# Patient Record
Sex: MALE | Race: WHITE | Employment: OTHER | URBAN - METROPOLITAN AREA
[De-identification: names, ages, dates, MRNs, and addresses within clinical notes are randomized per-mention and may not be internally consistent; named-entity substitution may affect disease eponyms.]

---

## 2017-01-17 ENCOUNTER — OFFICE VISIT (OUTPATIENT)
Dept: ENT CLINIC | Age: 72
End: 2017-01-17

## 2017-01-17 VITALS
WEIGHT: 215 LBS | DIASTOLIC BLOOD PRESSURE: 73 MMHG | SYSTOLIC BLOOD PRESSURE: 130 MMHG | HEIGHT: 74 IN | BODY MASS INDEX: 27.59 KG/M2 | HEART RATE: 95 BPM

## 2017-01-17 DIAGNOSIS — H91.93 HEARING LOSS OF BOTH EARS: Primary | ICD-10-CM

## 2017-01-17 DIAGNOSIS — R22.1 NECK MASS: ICD-10-CM

## 2017-01-17 DIAGNOSIS — H93.13 SUBJECTIVE TINNITUS OF BOTH EARS: ICD-10-CM

## 2017-01-17 PROCEDURE — 99204 OFFICE O/P NEW MOD 45 MIN: CPT | Performed by: OTOLARYNGOLOGY

## 2017-01-17 PROCEDURE — G8427 DOCREV CUR MEDS BY ELIG CLIN: HCPCS | Performed by: OTOLARYNGOLOGY

## 2017-01-17 PROCEDURE — 1036F TOBACCO NON-USER: CPT | Performed by: OTOLARYNGOLOGY

## 2017-01-17 PROCEDURE — 4040F PNEUMOC VAC/ADMIN/RCVD: CPT | Performed by: OTOLARYNGOLOGY

## 2017-01-17 PROCEDURE — G8484 FLU IMMUNIZE NO ADMIN: HCPCS | Performed by: OTOLARYNGOLOGY

## 2017-01-17 PROCEDURE — G8598 ASA/ANTIPLAT THER USED: HCPCS | Performed by: OTOLARYNGOLOGY

## 2017-01-17 PROCEDURE — 1123F ACP DISCUSS/DSCN MKR DOCD: CPT | Performed by: OTOLARYNGOLOGY

## 2017-01-17 PROCEDURE — G8420 CALC BMI NORM PARAMETERS: HCPCS | Performed by: OTOLARYNGOLOGY

## 2017-01-17 PROCEDURE — 3017F COLORECTAL CA SCREEN DOC REV: CPT | Performed by: OTOLARYNGOLOGY

## 2017-01-18 ENCOUNTER — TELEPHONE (OUTPATIENT)
Dept: ENT CLINIC | Age: 72
End: 2017-01-18

## 2017-01-18 DIAGNOSIS — R22.1 NECK MASS: Primary | ICD-10-CM

## 2017-01-24 ENCOUNTER — HOSPITAL ENCOUNTER (OUTPATIENT)
Dept: CT IMAGING | Age: 72
Discharge: HOME | End: 2017-01-24
Attending: OTOLARYNGOLOGY | Admitting: OTOLARYNGOLOGY

## 2017-01-24 DIAGNOSIS — R22.1 NECK MASS: ICD-10-CM

## 2017-01-24 DIAGNOSIS — R22.1 LOCALIZED SWELLING, MASS OR LUMP OF NECK: ICD-10-CM

## 2017-02-10 ENCOUNTER — OFFICE VISIT (OUTPATIENT)
Dept: ENT CLINIC | Age: 72
End: 2017-02-10

## 2017-02-10 VITALS
SYSTOLIC BLOOD PRESSURE: 134 MMHG | DIASTOLIC BLOOD PRESSURE: 6 MMHG | HEIGHT: 74 IN | BODY MASS INDEX: 27.59 KG/M2 | HEART RATE: 56 BPM | WEIGHT: 215 LBS

## 2017-02-10 DIAGNOSIS — H93.13 SUBJECTIVE TINNITUS OF BOTH EARS: ICD-10-CM

## 2017-02-10 DIAGNOSIS — H91.93 HEARING LOSS OF BOTH EARS: ICD-10-CM

## 2017-02-10 DIAGNOSIS — R22.1 NECK MASS: Primary | ICD-10-CM

## 2017-02-10 PROCEDURE — G8427 DOCREV CUR MEDS BY ELIG CLIN: HCPCS | Performed by: OTOLARYNGOLOGY

## 2017-02-10 PROCEDURE — 1036F TOBACCO NON-USER: CPT | Performed by: OTOLARYNGOLOGY

## 2017-02-10 PROCEDURE — 3017F COLORECTAL CA SCREEN DOC REV: CPT | Performed by: OTOLARYNGOLOGY

## 2017-02-10 PROCEDURE — 4040F PNEUMOC VAC/ADMIN/RCVD: CPT | Performed by: OTOLARYNGOLOGY

## 2017-02-10 PROCEDURE — 99213 OFFICE O/P EST LOW 20 MIN: CPT | Performed by: OTOLARYNGOLOGY

## 2017-02-10 PROCEDURE — G8484 FLU IMMUNIZE NO ADMIN: HCPCS | Performed by: OTOLARYNGOLOGY

## 2017-02-10 PROCEDURE — 1123F ACP DISCUSS/DSCN MKR DOCD: CPT | Performed by: OTOLARYNGOLOGY

## 2017-02-10 PROCEDURE — G8420 CALC BMI NORM PARAMETERS: HCPCS | Performed by: OTOLARYNGOLOGY

## 2017-02-10 PROCEDURE — G8598 ASA/ANTIPLAT THER USED: HCPCS | Performed by: OTOLARYNGOLOGY

## 2017-03-08 ENCOUNTER — TELEPHONE (OUTPATIENT)
Dept: ENT CLINIC | Age: 72
End: 2017-03-08

## 2017-04-07 ENCOUNTER — TELEPHONE (OUTPATIENT)
Dept: CARDIOLOGY CLINIC | Age: 72
End: 2017-04-07

## 2017-05-22 RX ORDER — ATORVASTATIN CALCIUM 40 MG/1
TABLET, FILM COATED ORAL
Qty: 90 TABLET | Refills: 1 | Status: SHIPPED | OUTPATIENT
Start: 2017-05-22 | End: 2017-11-28 | Stop reason: SDUPTHER

## 2017-05-22 RX ORDER — LISINOPRIL 20 MG/1
TABLET ORAL
Qty: 90 TABLET | Refills: 1 | Status: SHIPPED | OUTPATIENT
Start: 2017-05-22 | End: 2017-11-28 | Stop reason: SDUPTHER

## 2017-08-10 ENCOUNTER — TELEPHONE (OUTPATIENT)
Dept: CARDIOLOGY CLINIC | Age: 72
End: 2017-08-10

## 2017-09-12 ENCOUNTER — OFFICE VISIT (OUTPATIENT)
Dept: CARDIOLOGY CLINIC | Age: 72
End: 2017-09-12

## 2017-09-12 VITALS
SYSTOLIC BLOOD PRESSURE: 130 MMHG | DIASTOLIC BLOOD PRESSURE: 76 MMHG | BODY MASS INDEX: 27.98 KG/M2 | OXYGEN SATURATION: 98 % | HEART RATE: 56 BPM | WEIGHT: 218 LBS | HEIGHT: 74 IN

## 2017-09-12 DIAGNOSIS — I10 BENIGN HYPERTENSION: ICD-10-CM

## 2017-09-12 DIAGNOSIS — E78.00 HYPERCHOLESTEROLEMIA: ICD-10-CM

## 2017-09-12 DIAGNOSIS — I25.10 CORONARY ARTERY DISEASE INVOLVING NATIVE CORONARY ARTERY OF NATIVE HEART WITHOUT ANGINA PECTORIS: ICD-10-CM

## 2017-09-12 DIAGNOSIS — I48.21 PERMANENT ATRIAL FIBRILLATION (HCC): Primary | ICD-10-CM

## 2017-09-12 PROCEDURE — 4040F PNEUMOC VAC/ADMIN/RCVD: CPT | Performed by: INTERNAL MEDICINE

## 2017-09-12 PROCEDURE — 99214 OFFICE O/P EST MOD 30 MIN: CPT | Performed by: INTERNAL MEDICINE

## 2017-09-12 PROCEDURE — G8419 CALC BMI OUT NRM PARAM NOF/U: HCPCS | Performed by: INTERNAL MEDICINE

## 2017-09-12 PROCEDURE — 93000 ELECTROCARDIOGRAM COMPLETE: CPT | Performed by: INTERNAL MEDICINE

## 2017-09-12 PROCEDURE — G8598 ASA/ANTIPLAT THER USED: HCPCS | Performed by: INTERNAL MEDICINE

## 2017-09-12 PROCEDURE — G8427 DOCREV CUR MEDS BY ELIG CLIN: HCPCS | Performed by: INTERNAL MEDICINE

## 2017-09-12 PROCEDURE — 3017F COLORECTAL CA SCREEN DOC REV: CPT | Performed by: INTERNAL MEDICINE

## 2017-09-12 PROCEDURE — 1123F ACP DISCUSS/DSCN MKR DOCD: CPT | Performed by: INTERNAL MEDICINE

## 2017-09-12 PROCEDURE — 1036F TOBACCO NON-USER: CPT | Performed by: INTERNAL MEDICINE

## 2017-11-07 ENCOUNTER — TELEPHONE (OUTPATIENT)
Dept: FAMILY MEDICINE CLINIC | Age: 72
End: 2017-11-07

## 2017-11-07 DIAGNOSIS — Z12.5 SCREENING PSA (PROSTATE SPECIFIC ANTIGEN): ICD-10-CM

## 2017-11-07 DIAGNOSIS — E78.00 HYPERCHOLESTEROLEMIA: Primary | ICD-10-CM

## 2017-11-07 DIAGNOSIS — I10 BENIGN HYPERTENSION: ICD-10-CM

## 2017-11-13 DIAGNOSIS — Z12.5 SCREENING PSA (PROSTATE SPECIFIC ANTIGEN): ICD-10-CM

## 2017-11-13 DIAGNOSIS — E78.00 HYPERCHOLESTEROLEMIA: ICD-10-CM

## 2017-11-13 DIAGNOSIS — I10 BENIGN HYPERTENSION: ICD-10-CM

## 2017-11-13 LAB
A/G RATIO: 1.6 (ref 1.1–2.2)
ALBUMIN SERPL-MCNC: 4.1 G/DL (ref 3.4–5)
ALP BLD-CCNC: 81 U/L (ref 40–129)
ALT SERPL-CCNC: 17 U/L (ref 10–40)
ANION GAP SERPL CALCULATED.3IONS-SCNC: 15 MMOL/L (ref 3–16)
AST SERPL-CCNC: 33 U/L (ref 15–37)
BILIRUB SERPL-MCNC: 1.9 MG/DL (ref 0–1)
BUN BLDV-MCNC: 10 MG/DL (ref 7–20)
CALCIUM SERPL-MCNC: 9.5 MG/DL (ref 8.3–10.6)
CHLORIDE BLD-SCNC: 103 MMOL/L (ref 99–110)
CHOLESTEROL, TOTAL: 143 MG/DL (ref 0–199)
CO2: 25 MMOL/L (ref 21–32)
CREAT SERPL-MCNC: 1.1 MG/DL (ref 0.8–1.3)
GFR AFRICAN AMERICAN: >60
GFR NON-AFRICAN AMERICAN: >60
GLOBULIN: 2.6 G/DL
GLUCOSE BLD-MCNC: 96 MG/DL (ref 70–99)
HDLC SERPL-MCNC: 46 MG/DL (ref 40–60)
LDL CHOLESTEROL CALCULATED: 72 MG/DL
POTASSIUM SERPL-SCNC: 4.4 MMOL/L (ref 3.5–5.1)
PROSTATE SPECIFIC ANTIGEN: 0.73 NG/ML (ref 0–4)
SODIUM BLD-SCNC: 143 MMOL/L (ref 136–145)
TOTAL PROTEIN: 6.7 G/DL (ref 6.4–8.2)
TRIGL SERPL-MCNC: 125 MG/DL (ref 0–150)
VLDLC SERPL CALC-MCNC: 25 MG/DL

## 2017-11-28 ENCOUNTER — OFFICE VISIT (OUTPATIENT)
Dept: FAMILY MEDICINE CLINIC | Age: 72
End: 2017-11-28

## 2017-11-28 VITALS
WEIGHT: 217.8 LBS | BODY MASS INDEX: 27.95 KG/M2 | TEMPERATURE: 97.8 F | DIASTOLIC BLOOD PRESSURE: 80 MMHG | SYSTOLIC BLOOD PRESSURE: 136 MMHG | HEIGHT: 74 IN | HEART RATE: 64 BPM

## 2017-11-28 DIAGNOSIS — I48.21 PERMANENT ATRIAL FIBRILLATION (HCC): ICD-10-CM

## 2017-11-28 DIAGNOSIS — Z87.891 FORMER TOBACCO USE: ICD-10-CM

## 2017-11-28 DIAGNOSIS — I10 BENIGN HYPERTENSION: Primary | ICD-10-CM

## 2017-11-28 DIAGNOSIS — E78.00 HYPERCHOLESTEROLEMIA: ICD-10-CM

## 2017-11-28 DIAGNOSIS — H54.61 VISION LOSS OF RIGHT EYE: ICD-10-CM

## 2017-11-28 DIAGNOSIS — C44.311 BASAL CELL CARCINOMA OF SKIN OF NOSE: ICD-10-CM

## 2017-11-28 PROBLEM — I48.91 ATRIAL FIBRILLATION (HCC): Status: ACTIVE | Noted: 2017-11-28

## 2017-11-28 PROCEDURE — 1123F ACP DISCUSS/DSCN MKR DOCD: CPT | Performed by: FAMILY MEDICINE

## 2017-11-28 PROCEDURE — G8419 CALC BMI OUT NRM PARAM NOF/U: HCPCS | Performed by: FAMILY MEDICINE

## 2017-11-28 PROCEDURE — G8510 SCR DEP NEG, NO PLAN REQD: HCPCS | Performed by: FAMILY MEDICINE

## 2017-11-28 PROCEDURE — 3288F FALL RISK ASSESSMENT DOCD: CPT | Performed by: FAMILY MEDICINE

## 2017-11-28 PROCEDURE — G8427 DOCREV CUR MEDS BY ELIG CLIN: HCPCS | Performed by: FAMILY MEDICINE

## 2017-11-28 PROCEDURE — G8598 ASA/ANTIPLAT THER USED: HCPCS | Performed by: FAMILY MEDICINE

## 2017-11-28 PROCEDURE — 1036F TOBACCO NON-USER: CPT | Performed by: FAMILY MEDICINE

## 2017-11-28 PROCEDURE — G8484 FLU IMMUNIZE NO ADMIN: HCPCS | Performed by: FAMILY MEDICINE

## 2017-11-28 PROCEDURE — 4040F PNEUMOC VAC/ADMIN/RCVD: CPT | Performed by: FAMILY MEDICINE

## 2017-11-28 PROCEDURE — 99214 OFFICE O/P EST MOD 30 MIN: CPT | Performed by: FAMILY MEDICINE

## 2017-11-28 PROCEDURE — 3017F COLORECTAL CA SCREEN DOC REV: CPT | Performed by: FAMILY MEDICINE

## 2017-11-28 RX ORDER — ATORVASTATIN CALCIUM 40 MG/1
TABLET, FILM COATED ORAL
Qty: 90 TABLET | Refills: 3 | Status: SHIPPED | OUTPATIENT
Start: 2017-11-28 | End: 2018-12-18 | Stop reason: SDUPTHER

## 2017-11-28 RX ORDER — LISINOPRIL 20 MG/1
TABLET ORAL
Qty: 90 TABLET | Refills: 0 | Status: SHIPPED | OUTPATIENT
Start: 2017-11-28 | End: 2018-02-26 | Stop reason: SDUPTHER

## 2017-11-28 ASSESSMENT — PATIENT HEALTH QUESTIONNAIRE - PHQ9
SUM OF ALL RESPONSES TO PHQ9 QUESTIONS 1 & 2: 0
2. FEELING DOWN, DEPRESSED OR HOPELESS: 0
1. LITTLE INTEREST OR PLEASURE IN DOING THINGS: 0
SUM OF ALL RESPONSES TO PHQ QUESTIONS 1-9: 0

## 2017-11-28 ASSESSMENT — ENCOUNTER SYMPTOMS
RHINORRHEA: 0
BLOOD IN STOOL: 0
DIARRHEA: 0
CONSTIPATION: 0
ABDOMINAL PAIN: 0
CHEST TIGHTNESS: 0
SHORTNESS OF BREATH: 0
WHEEZING: 0
EYE PAIN: 0
VOMITING: 0
SINUS PRESSURE: 0
COUGH: 0
EYE DISCHARGE: 0

## 2017-11-28 NOTE — PROGRESS NOTES
Subjective:      Patient ID: Giles Childers is a 67 y.o. male. HPI  Chief Complaint   Patient presents with    Hypertension     annual f/u on htn and hld    Hyperlipidemia     Here for checkup on htn, hld. Doing well. Taking all meds  See cardiology for a fib. Is on eliquis- some easy bruising but no bleed  Also has had BCC taken of face  Did not f/u with ENT for hearing loss  Saw Dr. Antonieta Arias for vision exam  Denies CP,SOB or syncope  Did get flu shot  H/o tobacco use in the [address] PamAngus Hayward is a 67 y.o. male with the [facility] [place]  Patient Active Problem List    Diagnosis Date Noted    Hearing loss of both ears 01/17/2017    Subjective tinnitus of both ears 01/17/2017    Neck mass 01/17/2017    Occipital infarction ([facility] [address] 07/27/2016    Need for pneumococcal vaccination 06/28/2016    Hearing loss 06/28/2016    Vision changes 06/28/2016    Left varicocele 09/21/2011    BCC (basal cell carcinoma of skin) 09/21/2011    Routine general medical examination at a health care facility 09/21/2011    Hypercholesterolemia 08/18/2011    Benign hypertension 08/18/2011    CAD (coronary artery disease) 08/18/2011     Current Outpatient Prescriptions   Medication Sig Dispense Refill    apixaban (ELIQUIS) 5 MG TABS tablet Take 1 tablet by mouth 2 times daily 180 tablet 1    lisinopril (PRINIVIL;ZESTRIL) 20 MG tablet Take 1 tablet by mouth  daily 90 tablet 1    atorvastatin (LIPITOR) 40 MG tablet Take 1 tablet by mouth  nightly 90 tablet 1    triamcinolone (KENALOG) 0.1 % cream Apply topically 2 times daily Apply topically 2 times daily.  saw palmetto 160 MG capsule Take 160 mg by mouth daily.  Methylsulfonylmethane 1500 MG TABS Take  by mouth Daily.  nitroGLYCERIN (NITROSTAT) 0.4 MG SL tablet Place 0.4 mg under the tongue every 5 minutes as needed.  Glucosamine-Chondroit-Vit C-Mn (GLUCOSAMINE CHONDROITIN COMPLX) TABS Take 1 tablet by mouth daily. No current facility-administered medications for this visit. Allergies: Review of patient's allergies indicates no known allergies. Past Medical History:   Diagnosis Date    Atrial fibrillation ([address]     CAD (coronary artery disease)     GERD (gastroesophageal reflux disease)     Hyperlipidemia     Hypertension     Osteoarthritis      Past Surgical History:   Procedure Laterality Date    CORONARY ANGIOPLASTY WITH STENT PLACEMENT  2008    EXTERNAL EAR SURGERY Right     removed basal cell cancer    SKIN CANCER DESTRUCTION  03/2017    Right cheek     Family History   Problem Relation Age of Onset    Heart Disease Father     Stroke Father      Social History   Substance Use Topics    Smoking status: Former Smoker    Smokeless tobacco: Never Used    Alcohol use No     Vitals:    11/28/17 1017   BP: 136/80   Pulse: 64   Temp: 97.8 °F (36.6 °C)   TempSrc: Oral   Weight: 217 lb 12.8 oz (98.8 kg)   Height: 6' 2\" (1.88 m)     Body mass index is 27.96 kg/m².      Wt Readings from Last 3 Encounters:   11/28/17 217 lb 12.8 oz (98.8 kg)   09/12/17 218 lb (98.9 kg)   02/10/17 215 lb (97.5 kg)     BP Readings from Last 3 Encounters:   11/28/17 136/80   09/12/17 130/76   02/10/17 (!) 134/6      Lab Review   Orders Only on 11/13/2017   Component Date Value    Cholesterol, Total 11/13/2017 143     Triglycerides 11/13/2017 125     HDL 11/13/2017 46     LDL Calculated 11/13/2017 72     VLDL CHOLESTEROL CALCULA* 11/13/2017 25     Sodium 11/13/2017 143     Potassium 11/13/2017 4.4     Chloride 11/13/2017 103     CO2 11/13/2017 25     Anion Gap 11/13/2017 15     Glucose 11/13/2017 96     BUN 11/13/2017 10     CREATININE 11/13/2017 1.1     GFR Non- 11/13/2017 >60     GFR  11/13/2017 >60     Calcium 11/13/2017 9.5     Total Protein 11/13/2017 6.7     Alb 11/13/2017 4.1     Albumin/Globulin Ratio 11/13/2017 1.6     Total Bilirubin 11/13/2017 1.9*    Alkaline Phosphatase 11/13/2017 81     ALT 11/13/2017 17     AST 11/13/2017 33     Globulin 11/13/2017 2.6     PSA 11/13/2017 0.73        Review of Systems   Constitutional: Negative for fatigue, fever and unexpected weight change. HENT: Positive for hearing loss. Negative for congestion, ear discharge, ear pain, rhinorrhea and sinus pressure. Eyes: Negative for pain, discharge and visual disturbance. Respiratory: Negative for cough, chest tightness, shortness of breath and wheezing. Cardiovascular: Negative for chest pain, palpitations and leg swelling. Gastrointestinal: Negative for abdominal pain, blood in stool, constipation, diarrhea and vomiting. Genitourinary: Negative for difficulty urinating, dysuria and hematuria. Neurological: Negative for dizziness, seizures, syncope and headaches. Psychiatric/Behavioral: Negative for dysphoric mood and sleep disturbance. The patient is not nervous/anxious. Objective:   Physical Exam   Constitutional: He is oriented to person, place, and time. He appears well-developed and well-nourished. No distress. HENT:   Head: Normocephalic. Right Ear: External ear normal.   Left Ear: External ear normal.   Nose: Nose normal.   Mouth/Throat: Oropharynx is clear and moist. No oropharyngeal exudate. Eyes: EOM are normal. Pupils are equal, round, and reactive to light. Neck: Neck supple. No thyromegaly present. Cardiovascular: Normal heart sounds and intact distal pulses. No murmur heard. A fib   Pulmonary/Chest: Effort normal and breath sounds normal. He has no wheezes. Abdominal: Soft. Bowel sounds are normal. He exhibits no distension. There is no tenderness. There is no rebound and no guarding. Musculoskeletal: Normal range of motion. Lymphadenopathy:     He has no cervical adenopathy. Neurological: He is alert and oriented to person, place, and time. No cranial nerve deficit. Coordination normal.   Skin: Skin is warm and dry. Psychiatric: He has a normal mood and affect.  His behavior is normal. Judgment and thought content normal.       Assessment:      HTN- controlled on meds  HLD- well controlled  A finb- onb eliquis  HEARING LOSS- sees [facility] on face- sees DERM  H/o tobacco use- rec AAA screen      Plan:      Reviewed labs w/ pt  Diet and exercise  Orders Placed This Encounter   Procedures    US screening for AAA     Standing Status:   Future     Standing Expiration Date:   11/28/2018     Order Specific Question:   Reason for exam:     Answer:   screen   refill meds  F/u new PCP

## 2017-11-30 RX ORDER — APIXABAN 5 MG/1
TABLET, FILM COATED ORAL
Qty: 180 TABLET | Refills: 3 | Status: SHIPPED | OUTPATIENT
Start: 2017-11-30 | End: 2018-03-29 | Stop reason: SDUPTHER

## 2017-12-15 ENCOUNTER — OFFICE VISIT (OUTPATIENT)
Dept: FAMILY MEDICINE CLINIC | Age: 72
End: 2017-12-15

## 2017-12-15 VITALS
HEIGHT: 74 IN | TEMPERATURE: 98.4 F | SYSTOLIC BLOOD PRESSURE: 130 MMHG | DIASTOLIC BLOOD PRESSURE: 82 MMHG | WEIGHT: 217.8 LBS | BODY MASS INDEX: 27.95 KG/M2

## 2017-12-15 DIAGNOSIS — J01.90 ACUTE BACTERIAL SINUSITIS: ICD-10-CM

## 2017-12-15 DIAGNOSIS — B96.89 ACUTE BACTERIAL SINUSITIS: ICD-10-CM

## 2017-12-15 PROCEDURE — 1036F TOBACCO NON-USER: CPT | Performed by: INTERNAL MEDICINE

## 2017-12-15 PROCEDURE — G8598 ASA/ANTIPLAT THER USED: HCPCS | Performed by: INTERNAL MEDICINE

## 2017-12-15 PROCEDURE — G8419 CALC BMI OUT NRM PARAM NOF/U: HCPCS | Performed by: INTERNAL MEDICINE

## 2017-12-15 PROCEDURE — G8427 DOCREV CUR MEDS BY ELIG CLIN: HCPCS | Performed by: INTERNAL MEDICINE

## 2017-12-15 PROCEDURE — 99213 OFFICE O/P EST LOW 20 MIN: CPT | Performed by: INTERNAL MEDICINE

## 2017-12-15 PROCEDURE — 4040F PNEUMOC VAC/ADMIN/RCVD: CPT | Performed by: INTERNAL MEDICINE

## 2017-12-15 PROCEDURE — 1123F ACP DISCUSS/DSCN MKR DOCD: CPT | Performed by: INTERNAL MEDICINE

## 2017-12-15 PROCEDURE — 3017F COLORECTAL CA SCREEN DOC REV: CPT | Performed by: INTERNAL MEDICINE

## 2017-12-15 PROCEDURE — G8484 FLU IMMUNIZE NO ADMIN: HCPCS | Performed by: INTERNAL MEDICINE

## 2017-12-15 RX ORDER — METHYLPREDNISOLONE 4 MG/1
2 TABLET ORAL DAILY
Status: ON HOLD | COMMUNITY
End: 2018-06-20 | Stop reason: ALTCHOICE

## 2017-12-15 RX ORDER — CEFUROXIME AXETIL 250 MG/1
250 TABLET ORAL 2 TIMES DAILY
Qty: 20 TABLET | Refills: 0 | Status: SHIPPED | OUTPATIENT
Start: 2017-12-15 | End: 2017-12-25

## 2017-12-15 ASSESSMENT — ENCOUNTER SYMPTOMS
COUGH: 0
RHINORRHEA: 1
APNEA: 0
SINUS PAIN: 1
ABDOMINAL PAIN: 0
SHORTNESS OF BREATH: 0

## 2017-12-15 NOTE — PROGRESS NOTES
Subjective:      Patient ID: Bobby Soulier is a 67 y.o. male. HPI   Chief Complaint   Patient presents with    Sinusitis     patient c/o sinus infection x 2 weeks; head congestion, difficulty hearing, sinus drainage. patient denies sore throat, cough, fever     Bobby Soulier is a 67 y.o. male with the following history as recorded in [facility]:  Patient Active Problem List    Diagnosis Date Noted    Vision loss of right eye 11/28/2017    Atrial fibrillation ([facility] [address] 11/28/2017    Former tobacco use 11/28/2017    Hearing loss of both ears 01/17/2017    Subjective tinnitus of both ears 01/17/2017    Neck mass 01/17/2017    Occipital infarction ([facility] [address] 07/27/2016    Need for pneumococcal vaccination 06/28/2016    Vision changes 06/28/2016    BCC (basal cell carcinoma of skin) 09/21/2011    Hypercholesterolemia 08/18/2011    Benign hypertension 08/18/2011    CAD (coronary artery disease) 08/18/2011     Current Outpatient Prescriptions   Medication Sig Dispense Refill    methylPREDNISolone (MEDROL) 4 MG tablet Take 2 mg by mouth daily      ELIQUIS 5 MG TABS tablet TAKE 1 TABLET BY MOUTH TWO  TIMES DAILY 180 tablet 3    lisinopril (PRINIVIL;ZESTRIL) 20 MG tablet TAKE 1 TABLET BY MOUTH  DAILY 90 tablet 0    atorvastatin (LIPITOR) 40 MG tablet TAKE 1 TABLET BY MOUTH  NIGHTLY 90 tablet 3    triamcinolone (KENALOG) 0.1 % cream Apply topically 2 times daily Apply topically 2 times daily.  saw palmetto 160 MG capsule Take 160 mg by mouth daily.  nitroGLYCERIN (NITROSTAT) 0.4 MG SL tablet Place 0.4 mg under the tongue every 5 minutes as needed.  Glucosamine-Chondroit-Vit C-Mn (GLUCOSAMINE CHONDROITIN COMPLX) TABS Take 1 tablet by mouth daily. No current facility-administered medications for this visit. Allergies: Review of patient's allergies indicates no known allergies.   Past Medical History:   Diagnosis Date    Atrial fibrillation ([address]     CAD (coronary artery disease)  GERD (gastroesophageal reflux disease)     Hyperlipidemia     Hypertension     Osteoarthritis      Past Surgical History:   Procedure Laterality Date    CORONARY ANGIOPLASTY WITH STENT PLACEMENT  2008    EXTERNAL EAR SURGERY Right     removed basal cell cancer    SKIN CANCER DESTRUCTION  03/2017    Right cheek     Family History   Problem Relation Age of Onset    Heart Disease Father     Stroke Father      Social History   Substance Use Topics    Smoking status: Former Smoker    Smokeless tobacco: Never Used    Alcohol use No       Review of Systems   Constitutional: Negative for chills and diaphoresis. HENT: Positive for congestion, postnasal drip, rhinorrhea and sinus pain. Eyes: Negative for visual disturbance. Respiratory: Negative for apnea, cough and shortness of breath. Cardiovascular: Negative for chest pain. Gastrointestinal: Negative for abdominal pain. Objective:   Physical Exam   Constitutional: He appears well-developed and well-nourished. HENT:   Head: Normocephalic and atraumatic. Edema bilat. nasal turbinates with drainage . Cardiovascular: Normal rate. No murmur heard. Pulmonary/Chest: Effort normal and breath sounds normal.   Abdominal: He exhibits no distension. There is no tenderness.        Assessment:      sinusitis      Plan:      ceftin 250 mg bid x 10  d

## 2018-02-27 RX ORDER — LISINOPRIL 20 MG/1
TABLET ORAL
Qty: 90 TABLET | Refills: 0 | Status: SHIPPED | OUTPATIENT
Start: 2018-02-27 | End: 2018-04-16 | Stop reason: SDUPTHER

## 2018-03-29 NOTE — TELEPHONE ENCOUNTER
Medication Refill    Medication needing refilled: eliquis     Doseage of the medication: 5mg    How are you taking this medication (QD, BID, TID, QID, PRN): 2x daily     Patient want a 30 or 90 day supply called in: 90 days    Which Pharmacy are we sending the medication to:    Pharmacy:  [facility] [place] [address],[address] [facility] -  [phone]

## 2018-03-29 NOTE — TELEPHONE ENCOUNTER
Last O/V:  09/12/17  Next O/V:  09/12/18    Last Labs:CMP  : 11/13/17    Please sign for Dr. Brant Francis

## 2018-04-16 RX ORDER — LISINOPRIL 20 MG/1
TABLET ORAL
Qty: 90 TABLET | Refills: 3 | Status: SHIPPED | OUTPATIENT
Start: 2018-04-16 | End: 2018-09-12 | Stop reason: ALTCHOICE

## 2018-06-19 PROBLEM — R00.1 SYMPTOMATIC BRADYCARDIA: Status: ACTIVE | Noted: 2018-06-19

## 2018-06-22 PROBLEM — Z95.0 PACEMAKER: Status: ACTIVE | Noted: 2018-06-22

## 2018-06-27 ENCOUNTER — TELEPHONE (OUTPATIENT)
Dept: CARDIOLOGY CLINIC | Age: 73
End: 2018-06-27

## 2018-07-02 ENCOUNTER — OFFICE VISIT (OUTPATIENT)
Dept: CARDIOLOGY CLINIC | Age: 73
End: 2018-07-02

## 2018-07-02 ENCOUNTER — PROCEDURE VISIT (OUTPATIENT)
Dept: CARDIOLOGY CLINIC | Age: 73
End: 2018-07-02

## 2018-07-02 VITALS
WEIGHT: 211 LBS | DIASTOLIC BLOOD PRESSURE: 84 MMHG | BODY MASS INDEX: 27.08 KG/M2 | HEIGHT: 74 IN | OXYGEN SATURATION: 98 % | HEART RATE: 60 BPM | SYSTOLIC BLOOD PRESSURE: 128 MMHG

## 2018-07-02 DIAGNOSIS — I48.91 ATRIAL FIBRILLATION WITH SLOW VENTRICULAR RESPONSE (HCC): Primary | ICD-10-CM

## 2018-07-02 DIAGNOSIS — Z95.0 PACEMAKER: ICD-10-CM

## 2018-07-02 DIAGNOSIS — I25.10 CORONARY ARTERY DISEASE INVOLVING NATIVE CORONARY ARTERY OF NATIVE HEART WITHOUT ANGINA PECTORIS: ICD-10-CM

## 2018-07-02 DIAGNOSIS — E78.2 MIXED HYPERLIPIDEMIA: ICD-10-CM

## 2018-07-02 DIAGNOSIS — R00.1 BRADYCARDIA: ICD-10-CM

## 2018-07-02 DIAGNOSIS — R00.1 BRADYCARDIA WITH 31-40 BEATS PER MINUTE: ICD-10-CM

## 2018-07-02 DIAGNOSIS — I10 ESSENTIAL HYPERTENSION: ICD-10-CM

## 2018-07-02 DIAGNOSIS — Z95.0 PACEMAKER: Primary | ICD-10-CM

## 2018-07-02 PROCEDURE — 99024 POSTOP FOLLOW-UP VISIT: CPT | Performed by: INTERNAL MEDICINE

## 2018-07-02 NOTE — PATIENT INSTRUCTIONS
Patient Education        Atrial Fibrillation: Care Instructions  Your Care Instructions    Atrial fibrillation is an irregular and often fast heartbeat. Treating this condition is important for several reasons. It can cause blood clots, which can travel from your heart to your brain and cause a stroke. If you have a fast heartbeat, you may feel lightheaded, dizzy, and weak. An irregular heartbeat can also increase your risk for heart failure. Atrial fibrillation is often the result of another heart condition, such as high blood pressure or coronary artery disease. Making changes to improve your heart condition will help you stay healthy and active. Follow-up care is a key part of your treatment and safety. Be sure to make and go to all appointments, and call your doctor if you are having problems. It's also a good idea to know your test results and keep a list of the medicines you take. How can you care for yourself at home? Medicines    · Take your medicines exactly as prescribed. Call your doctor if you think you are having a problem with your medicine. You will get more details on the specific medicines your doctor prescribes.     · If your doctor has given you a blood thinner to prevent a stroke, be sure you get instructions about how to take your medicine safely. Blood thinners can cause serious bleeding problems.     · Do not take any vitamins, over-the-counter drugs, or herbal products without talking to your doctor first.    Lifestyle changes    · Do not smoke. Smoking can increase your chance of a stroke and heart attack. If you need help quitting, talk to your doctor about stop-smoking programs and medicines. These can increase your chances of quitting for good.     · Eat a heart-healthy diet.     · Stay at a healthy weight. Lose weight if you need to.     · Limit alcohol to 2 drinks a day for men and 1 drink a day for women. Too much alcohol can cause health problems.     · Avoid colds and flu.  Get

## 2018-07-02 NOTE — PROGRESS NOTES
Patient comes in for programming evaluation for his pacemaker ([employer], implanted 6/20/18). The device is functioning within normal parameters. Programmed outputs per safety margins. (changed to 2.5V @ 0.24ms). Rate response turned on per Dr. Flavia Lees recommendations. Programmed to VVIR. Exercise test performed on pt to confirm vector for rate response. No new episodes/arrhythmias recorded. Please see interrogation for more detail. Dr. Sylvia Duran to review interrogation. Patient is to see Dr. Sylvia Duran in office today also. Patient will follow up in 3 months in office or remotely.

## 2018-07-02 NOTE — PROGRESS NOTES
(NITROSTAT) 0.4 MG SL tablet Place 0.4 mg under the tongue every 5 minutes as needed. Yes Historical Provider, MD   Glucosamine-Chondroit-Vit C-Mn (GLUCOSAMINE CHONDROITIN COMPLX) TABS Take 1 tablet by mouth daily. Yes Historical Provider, MD       Social History:  Reviewed. reports that he has quit smoking. He has never used smokeless tobacco. He reports that he does not drink alcohol or use drugs. Family History:  Reviewed. family history includes Heart Disease in his father; Stroke in his father. Denies family history of sudden cardiac death, arrhythmia, premature CAD    Review of System:    · General ROS: negative for - chills, fever   · Psychological ROS: negative for - anxiety or depression  · Ophthalmic ROS: negative for - eye pain or loss of vision  · ENT ROS: negative for - headaches, sore throat   · Allergy and Immunology ROS: negative for - hives  · Hematological and Lymphatic ROS: negative for - bleeding problems, blood clots, bruising or jaundice  · Endocrine ROS: negative for - skin changes, temperature intolerance or unexpected weight changes  · Respiratory ROS: negative for - cough, sputum, wheezing  · Cardiovascular ROS: Per HPI. · Gastrointestinal ROS: negative for - abdominal pain, diarrhea, nausea/vomiting, bleeding   · Genito-Urinary ROS: negative for - dysuria or incontinence  · Musculoskeletal ROS: negative for - joint swelling   · Neurological ROS: negative for - confusion, numbness/tingling, seizures, weakness +lightheadedness  · Dermatological ROS: negative for - rash    Physical Examination:  Vitals:    07/02/18 0904   BP: 128/84   Pulse: 60   SpO2: 98%       · Constitutional: Oriented. No distress. · Head: Normocephalic and atraumatic. · Mouth/Throat: Oropharynx is clear and moist.   · Eyes: Conjunctivae normal. EOM are normal.   · Neck: Normal range of motion. Neck supple. No rigidity. No JVD present.    · Cardiovascular: Normal rate, regular rhythm, S1&S2 and intact distal pulses. · Pulmonary/Chest: Bilateral respiratory sounds. No wheezes. No rhonchi. · Abdominal: Soft. Bowel sounds present. No distension, No tenderness. · Musculoskeletal: No tenderness. No edema    · Lymphadenopathy: Has no cervical adenopathy. · Neurological: Alert and oriented. Cranial nerve appears intact, No Gross deficit   · Skin: Skin is warm and dry. No rash noted. +Right groin unremarkable  · Psychiatric: Has a normal mood, affect and behavior       Labs:  Reviewed. ECG: reviewed, 6/19/18 Afib SVR    Echo: 8/2/16  Concentric LVH with normal systolic function. EF is   60%  The left atrium is at the upper limits of normal in size. Trivial mitral, aortic and tricuspid regurgitation is present. Normal right ventricular size and function.     Stress MPI: 12/8/2008  Large sized severe apical, anteroseptal and septal   completely reversible defect consistent with ischemia   in the territory typical of the proximal LAD. Cath: 2008  Single vessel CAD involving LAD  Normal LV function  S/p multiple stents in the LAD     Assessment:   Patient Active Problem List    Diagnosis Date Noted    Pacemaker 06/22/2018    Bradycardia with 31-40 beats per minute 06/19/2018    Acute bacterial sinusitis 12/15/2017    Vision loss of right eye 11/28/2017    Atrial fibrillation with slow ventricular response ([address] 11/28/2017    Former tobacco use 11/28/2017    Hearing loss of both ears 01/17/2017    Subjective tinnitus of both ears 01/17/2017    Neck mass 01/17/2017    Occipital infarction ([address] 07/27/2016    Need for pneumococcal vaccination 06/28/2016    Vision changes 06/28/2016    BCC (basal cell carcinoma of skin) 09/21/2011    Hypercholesterolemia 08/18/2011    Benign hypertension 08/18/2011    CAD (coronary artery disease) 08/18/2011        Plan:  1) Bradycardia: Symptomatic. S/p PPM (Medtronic Micro Lead Less). Device interrogated today and functioning appropriately.   97%     2) Atrial fib SVR: Paroxysmal. No afib noted on device interrogation today. CHADS Vasc 2 (age, HTN)- on Eliquis     3) CAD: S/p stent to LAD. No angina. Continue ASA, statin and BB. 3) HTN: Controlled. Continue current medications.       4) HLD: Continue statin    Follow up in 3 months    I have discussed the plan of care with the primary care team.    Stephanie Carson MD, PhD

## 2018-09-12 ENCOUNTER — OFFICE VISIT (OUTPATIENT)
Dept: CARDIOLOGY CLINIC | Age: 73
End: 2018-09-12

## 2018-09-12 VITALS
HEART RATE: 69 BPM | HEIGHT: 74 IN | DIASTOLIC BLOOD PRESSURE: 70 MMHG | SYSTOLIC BLOOD PRESSURE: 134 MMHG | WEIGHT: 213 LBS | BODY MASS INDEX: 27.34 KG/M2 | OXYGEN SATURATION: 97 %

## 2018-09-12 DIAGNOSIS — E78.00 HYPERCHOLESTEROLEMIA: ICD-10-CM

## 2018-09-12 DIAGNOSIS — I48.20 CHRONIC A-FIB (HCC): Primary | ICD-10-CM

## 2018-09-12 DIAGNOSIS — I25.10 CORONARY ARTERY DISEASE INVOLVING NATIVE CORONARY ARTERY OF NATIVE HEART WITHOUT ANGINA PECTORIS: ICD-10-CM

## 2018-09-12 DIAGNOSIS — I10 ESSENTIAL HYPERTENSION: ICD-10-CM

## 2018-09-12 PROCEDURE — 3017F COLORECTAL CA SCREEN DOC REV: CPT | Performed by: INTERNAL MEDICINE

## 2018-09-12 PROCEDURE — G8598 ASA/ANTIPLAT THER USED: HCPCS | Performed by: INTERNAL MEDICINE

## 2018-09-12 PROCEDURE — 1123F ACP DISCUSS/DSCN MKR DOCD: CPT | Performed by: INTERNAL MEDICINE

## 2018-09-12 PROCEDURE — G8419 CALC BMI OUT NRM PARAM NOF/U: HCPCS | Performed by: INTERNAL MEDICINE

## 2018-09-12 PROCEDURE — 93000 ELECTROCARDIOGRAM COMPLETE: CPT | Performed by: INTERNAL MEDICINE

## 2018-09-12 PROCEDURE — 1036F TOBACCO NON-USER: CPT | Performed by: INTERNAL MEDICINE

## 2018-09-12 PROCEDURE — 1101F PT FALLS ASSESS-DOCD LE1/YR: CPT | Performed by: INTERNAL MEDICINE

## 2018-09-12 PROCEDURE — 4040F PNEUMOC VAC/ADMIN/RCVD: CPT | Performed by: INTERNAL MEDICINE

## 2018-09-12 PROCEDURE — 99214 OFFICE O/P EST MOD 30 MIN: CPT | Performed by: INTERNAL MEDICINE

## 2018-09-12 PROCEDURE — G8427 DOCREV CUR MEDS BY ELIG CLIN: HCPCS | Performed by: INTERNAL MEDICINE

## 2018-09-12 RX ORDER — LOSARTAN POTASSIUM 100 MG/1
100 TABLET ORAL DAILY
Qty: 30 TABLET | Refills: 3 | Status: SHIPPED | OUTPATIENT
Start: 2018-09-12 | End: 2019-02-06 | Stop reason: ALTCHOICE

## 2018-09-12 NOTE — PROGRESS NOTES
(PRINIVIL;ZESTRIL) 20 MG tablet TAKE 1 TABLET BY MOUTH  DAILY 90 tablet 3    apixaban (ELIQUIS) 5 MG TABS tablet TAKE 1 TABLET BY MOUTH TWO  TIMES DAILY 180 tablet 3    atorvastatin (LIPITOR) 40 MG tablet TAKE 1 TABLET BY MOUTH  NIGHTLY 90 tablet 3    saw palmetto 160 MG capsule Take 160 mg by mouth daily.  nitroGLYCERIN (NITROSTAT) 0.4 MG SL tablet Place 0.4 mg under the tongue every 5 minutes as needed.  Glucosamine-Chondroit-Vit C-Mn (GLUCOSAMINE CHONDROITIN COMPLX) TABS Take 1 tablet by mouth daily. No current facility-administered medications for this visit. EC18 V-paced with underlying AFIB    ECHO 16  Concentric LVH with normal systolic function. EF is 60% The left atrium is at the upper limits of normal in size. Trivial mitral, aortic and tricuspid regurgitation is present. Normal right ventricular size and function.       Stress MPI: 2008  Large sized severe apical, anteroseptal and septal completely reversible defect consistent with ischemia in the territory typical of the proximal LAD. Corornary angiogram  & Intervention:   Single vessel CAD involving LAD  Normal LV function  S/p multiple stents in the LAD      Assessment/Plan:    Only complaint today is a rash/itching. He has had this problem for approx 4 years and has been to a dermatologist who says he has dermatitis. I will switch him from Lisinopril to Losartan and have him hold Atorvastatin to see if this helps. If no resolve, he will need to resume both medications. He also has a balance issue and will require a handicap placard. Chronic Atrial Fibrillation  V-paced with underlying afib on ECG today  CHads Vasc at least 2 (Age, HTN)  Continue Eliquis 5 mg BID    CAD  S/p stent LAD   No angina  Continue ACE-I and Statin    Essential Hypertension  BP is controlled   On ACE-I    Hypercholesterolemia  On Atorvastatin    PPM  S/p 18  Medtronic Micro Leadless     Follow up in 1 year. Thank you very much for allowing me to participate in the care of your patient. Please do not hesitate to contact me if you have any questions. Sincerely,    Siva Vaca M.D  [facility], [address] [place], [place] [address]John [place] [address]  Ph: [phone]  Fax: [phone]      Physician Attestation:  The scribes documentation has been prepared under my direction and personally reviewed by me in its entirety. I confirm that the note above accurately reflects all work, treatment, procedures, and medical decision making performed by me.

## 2018-09-12 NOTE — PATIENT INSTRUCTIONS
a pneumococcal vaccine shot. If you have had one before, ask your doctor whether you need another dose. Get a flu shot every year. If you must be around people with colds or flu, wash your hands often. Activity    · If your doctor recommends it, get more exercise. Walking is a good choice. Bit by bit, increase the amount you walk every day. Try for at least 30 minutes on most days of the week. You also may want to swim, bike, or do other activities. Your doctor may suggest that you join a cardiac rehabilitation program so that you can have help increasing your physical activity safely.     · Start light exercise if your doctor says it is okay. Even a small amount will help you get stronger, have more energy, and manage stress. Walking is an easy way to get exercise. Start out by walking a little more than you did in the hospital. Gradually increase the amount you walk.     · When you exercise, watch for signs that your heart is working too hard. You are pushing too hard if you cannot talk while you are exercising. If you become short of breath or dizzy or have chest pain, sit down and rest immediately.     · Check your pulse regularly. Place two fingers on the artery at the palm side of your wrist, in line with your thumb. If your heartbeat seems uneven or fast, talk to your doctor. When should you call for help? Call 911 anytime you think you may need emergency care. For example, call if:    · You have symptoms of a heart attack. These may include:  ¨ Chest pain or pressure, or a strange feeling in the chest.  ¨ Sweating. ¨ Shortness of breath. ¨ Nausea or vomiting. ¨ Pain, pressure, or a strange feeling in the back, neck, jaw, or upper belly or in one or both shoulders or arms. ¨ Lightheadedness or sudden weakness. ¨ A fast or irregular heartbeat. After you call 911, the  may tell you to chew 1 adult-strength or 2 to 4 low-dose aspirin. Wait for an ambulance.  Do not try to drive yourself.     · You have symptoms of a stroke. These may include:  ¨ Sudden numbness, tingling, weakness, or loss of movement in your face, arm, or leg, especially on only one side of your body. ¨ Sudden vision changes. ¨ Sudden trouble speaking. ¨ Sudden confusion or trouble understanding simple statements. ¨ Sudden problems with walking or balance. ¨ A sudden, severe headache that is different from past headaches.     · You passed out (lost consciousness).    Call your doctor now or seek immediate medical care if:    · You have new or increased shortness of breath.     · You feel dizzy or lightheaded, or you feel like you may faint.     · Your heart rate becomes irregular.     · You can feel your heart flutter in your chest or skip heartbeats. Tell your doctor if these symptoms are new or worse.    Watch closely for changes in your health, and be sure to contact your doctor if you have any problems. Where can you learn more? Go to https://SHOP.COM.[employer]. org and sign in to your [employer] account. Enter U020 in the [employer] box to learn more about \"Atrial Fibrillation: Care Instructions. \"     If you do not have an account, please click on the \"Sign Up Now\" link. Current as of: December 6, 2017  Content Version: 11.7  © 0474-0439 [employer]. Care instructions adapted under license by [facility] ([facility]). If you have questions about a medical condition or this instruction, always ask your healthcare professional. [place] [address] any warranty or liability for your use of this information.

## 2018-10-09 ENCOUNTER — OFFICE VISIT (OUTPATIENT)
Dept: INTERNAL MEDICINE CLINIC | Age: 73
End: 2018-10-09
Payer: MEDICARE

## 2018-10-09 VITALS
BODY MASS INDEX: 29.07 KG/M2 | RESPIRATION RATE: 16 BRPM | WEIGHT: 214.6 LBS | OXYGEN SATURATION: 98 % | DIASTOLIC BLOOD PRESSURE: 60 MMHG | TEMPERATURE: 98.3 F | HEART RATE: 74 BPM | SYSTOLIC BLOOD PRESSURE: 144 MMHG | HEIGHT: 72 IN

## 2018-10-09 DIAGNOSIS — I48.91 ATRIAL FIBRILLATION WITH SLOW VENTRICULAR RESPONSE (HCC): Primary | ICD-10-CM

## 2018-10-09 DIAGNOSIS — J31.0 CHRONIC RHINITIS: ICD-10-CM

## 2018-10-09 DIAGNOSIS — D12.6 ADENOMATOUS POLYP OF COLON, UNSPECIFIED PART OF COLON: ICD-10-CM

## 2018-10-09 DIAGNOSIS — L30.9 ECZEMA, UNSPECIFIED TYPE: ICD-10-CM

## 2018-10-09 DIAGNOSIS — Z23 NEED FOR IMMUNIZATION AGAINST INFLUENZA: ICD-10-CM

## 2018-10-09 DIAGNOSIS — I25.10 CORONARY ARTERY DISEASE INVOLVING NATIVE CORONARY ARTERY OF NATIVE HEART WITHOUT ANGINA PECTORIS: ICD-10-CM

## 2018-10-09 DIAGNOSIS — R00.1 BRADYCARDIA: ICD-10-CM

## 2018-10-09 PROCEDURE — G0008 ADMIN INFLUENZA VIRUS VAC: HCPCS | Performed by: INTERNAL MEDICINE

## 2018-10-09 PROCEDURE — G8598 ASA/ANTIPLAT THER USED: HCPCS | Performed by: INTERNAL MEDICINE

## 2018-10-09 PROCEDURE — G8419 CALC BMI OUT NRM PARAM NOF/U: HCPCS | Performed by: INTERNAL MEDICINE

## 2018-10-09 PROCEDURE — 99203 OFFICE O/P NEW LOW 30 MIN: CPT | Performed by: INTERNAL MEDICINE

## 2018-10-09 PROCEDURE — 3017F COLORECTAL CA SCREEN DOC REV: CPT | Performed by: INTERNAL MEDICINE

## 2018-10-09 PROCEDURE — G8482 FLU IMMUNIZE ORDER/ADMIN: HCPCS | Performed by: INTERNAL MEDICINE

## 2018-10-09 PROCEDURE — 1101F PT FALLS ASSESS-DOCD LE1/YR: CPT | Performed by: INTERNAL MEDICINE

## 2018-10-09 PROCEDURE — 1123F ACP DISCUSS/DSCN MKR DOCD: CPT | Performed by: INTERNAL MEDICINE

## 2018-10-09 PROCEDURE — 90662 IIV NO PRSV INCREASED AG IM: CPT | Performed by: INTERNAL MEDICINE

## 2018-10-09 PROCEDURE — 1036F TOBACCO NON-USER: CPT | Performed by: INTERNAL MEDICINE

## 2018-10-09 PROCEDURE — 4040F PNEUMOC VAC/ADMIN/RCVD: CPT | Performed by: INTERNAL MEDICINE

## 2018-10-09 PROCEDURE — G8427 DOCREV CUR MEDS BY ELIG CLIN: HCPCS | Performed by: INTERNAL MEDICINE

## 2018-10-09 RX ORDER — LORATADINE 10 MG/1
10 TABLET ORAL DAILY
Qty: 30 TABLET | Refills: 2 | COMMUNITY
Start: 2018-10-09 | End: 2019-08-15 | Stop reason: ALTCHOICE

## 2018-10-09 ASSESSMENT — ENCOUNTER SYMPTOMS
ANAL BLEEDING: 0
VOMITING: 0
BACK PAIN: 0
SHORTNESS OF BREATH: 0
COUGH: 0
NAUSEA: 0
SINUS PRESSURE: 1
DIARRHEA: 0
CONSTIPATION: 0
ABDOMINAL PAIN: 0

## 2018-10-09 ASSESSMENT — PATIENT HEALTH QUESTIONNAIRE - PHQ9
SUM OF ALL RESPONSES TO PHQ QUESTIONS 1-9: 0
1. LITTLE INTEREST OR PLEASURE IN DOING THINGS: 0
SUM OF ALL RESPONSES TO PHQ QUESTIONS 1-9: 0
2. FEELING DOWN, DEPRESSED OR HOPELESS: 0
SUM OF ALL RESPONSES TO PHQ9 QUESTIONS 1 & 2: 0

## 2018-10-09 NOTE — PROGRESS NOTES
appears well-developed and well-nourished. No distress. HENT:   Head: Normocephalic and atraumatic. Right Ear: External ear normal.   Left Ear: External ear normal.   Mouth/Throat: Oropharynx is clear and moist.   Hard of hearing   Eyes: Conjunctivae are normal.   Neck: Neck supple. No JVD present. No thyromegaly present. Cardiovascular: Normal rate, regular rhythm and normal heart sounds. Exam reveals no gallop and no friction rub. No murmur heard. Pulmonary/Chest: Effort normal and breath sounds normal. He exhibits no tenderness. Abdominal: Soft. He exhibits no distension. There is no tenderness. No HSM   Musculoskeletal: He exhibits no edema. Lymphadenopathy:     He has no cervical adenopathy. Neurological: He is alert and oriented to person, place, and time. Coordination normal.   Skin: Skin is warm and dry. Rash noted. Psychiatric: He has a normal mood and affect. His behavior is normal.   Nursing note and vitals reviewed. ASSESSMENT/PLAN:    Nevaeh Day was seen today for new patient and sinusitis. 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 Adenomatous polyp of colon, unspecified part of colon    Chronic rhinitis  -     loratadine (CLARITIN) 10 MG tablet;  Take 1 tablet by mouth daily    Eczema, unspecified type    Need for immunization against influenza  -     INFLUENZA, HIGH DOSE, 65 YRS +, IM, PF, PREFILL SYR, 0.5ML (FLUZONE HD)        Orders Placed This Encounter   Medications    loratadine (CLARITIN) 10 MG tablet     Sig: Take 1 tablet by mouth daily     Dispense:  30 tablet     Refill:  2        Return in about 1 year (around 10/9/2019), or if symptoms worsen or fail to improve. Patient Instructions   Please call your pharmacy if you need any refills of your medication(s). Please call our office at [phone] if you don't hear from us about your test results. Bring an accurate list of your medications with you at every appointment to ensure that we have the correct information.     Our office hours are: Monday - Friday 8:30 am- 5 pm    Phone lines turn on at 8:30 am

## 2018-10-10 ENCOUNTER — TELEPHONE (OUTPATIENT)
Dept: INTERNAL MEDICINE CLINIC | Age: 73
End: 2018-10-10

## 2018-12-03 DIAGNOSIS — N28.9 RENAL INSUFFICIENCY: Primary | ICD-10-CM

## 2018-12-03 DIAGNOSIS — I48.91 ATRIAL FIBRILLATION WITH SLOW VENTRICULAR RESPONSE (HCC): ICD-10-CM

## 2018-12-03 DIAGNOSIS — I25.10 CORONARY ARTERY DISEASE INVOLVING NATIVE CORONARY ARTERY OF NATIVE HEART WITHOUT ANGINA PECTORIS: ICD-10-CM

## 2018-12-03 LAB
A/G RATIO: 1.4 (ref 1.1–2.2)
ALBUMIN SERPL-MCNC: 4.2 G/DL (ref 3.4–5)
ALP BLD-CCNC: 96 U/L (ref 40–129)
ALT SERPL-CCNC: 22 U/L (ref 10–40)
ANION GAP SERPL CALCULATED.3IONS-SCNC: 12 MMOL/L (ref 3–16)
AST SERPL-CCNC: 32 U/L (ref 15–37)
BILIRUB SERPL-MCNC: 1.3 MG/DL (ref 0–1)
BUN BLDV-MCNC: 10 MG/DL (ref 7–20)
CALCIUM SERPL-MCNC: 10.3 MG/DL (ref 8.3–10.6)
CHLORIDE BLD-SCNC: 104 MMOL/L (ref 99–110)
CHOLESTEROL, TOTAL: 141 MG/DL (ref 0–199)
CO2: 27 MMOL/L (ref 21–32)
CREAT SERPL-MCNC: 1.4 MG/DL (ref 0.8–1.3)
GFR AFRICAN AMERICAN: 60
GFR NON-AFRICAN AMERICAN: 50
GLOBULIN: 2.9 G/DL
GLUCOSE BLD-MCNC: 109 MG/DL (ref 70–99)
HDLC SERPL-MCNC: 43 MG/DL (ref 40–60)
LDL CHOLESTEROL CALCULATED: 70 MG/DL
POTASSIUM SERPL-SCNC: 4.4 MMOL/L (ref 3.5–5.1)
SODIUM BLD-SCNC: 143 MMOL/L (ref 136–145)
TOTAL PROTEIN: 7.1 G/DL (ref 6.4–8.2)
TRIGL SERPL-MCNC: 140 MG/DL (ref 0–150)
VLDLC SERPL CALC-MCNC: 28 MG/DL

## 2018-12-17 ENCOUNTER — TELEPHONE (OUTPATIENT)
Dept: CARDIOLOGY CLINIC | Age: 73
End: 2018-12-17

## 2018-12-18 ENCOUNTER — TELEPHONE (OUTPATIENT)
Dept: INTERNAL MEDICINE CLINIC | Age: 73
End: 2018-12-18

## 2018-12-18 RX ORDER — ATORVASTATIN CALCIUM 40 MG/1
TABLET, FILM COATED ORAL
Qty: 90 TABLET | Refills: 3 | Status: SHIPPED | OUTPATIENT
Start: 2018-12-18 | End: 2018-12-21 | Stop reason: SDUPTHER

## 2018-12-18 NOTE — TELEPHONE ENCOUNTER
Alexus calling b/c pt was getting his rx's filled thru mail order but know he wants to get his rx's filled thru [facility] in [place]. Pt needs new rx's on atorvastatin (LIPITOR) 40 MG tablet and lisinopril (PRINIVIL;ZESTRIL) tablet 20 mg to be sent to [facility] in [place].

## 2018-12-19 ENCOUNTER — TELEPHONE (OUTPATIENT)
Dept: CARDIOLOGY CLINIC | Age: 73
End: 2018-12-19

## 2018-12-19 NOTE — TELEPHONE ENCOUNTER
As stated in Dr. Burke Ritchie, pt needs to resume both Atorvastatin and either Lisinopril or Losartan. He should f/u with PCP or Dermatologist.  Please call, thanks.

## 2018-12-21 RX ORDER — ATORVASTATIN CALCIUM 40 MG/1
TABLET, FILM COATED ORAL
Qty: 90 TABLET | Refills: 3 | Status: SHIPPED | OUTPATIENT
Start: 2018-12-21 | End: 2019-12-27

## 2019-01-15 ENCOUNTER — NURSE ONLY (OUTPATIENT)
Dept: CARDIOLOGY CLINIC | Age: 74
End: 2019-01-15
Payer: MEDICARE

## 2019-01-15 DIAGNOSIS — Z95.0 PACEMAKER: ICD-10-CM

## 2019-01-15 DIAGNOSIS — R00.1 BRADYCARDIA WITH 31-40 BEATS PER MINUTE: ICD-10-CM

## 2019-01-15 PROCEDURE — 93294 REM INTERROG EVL PM/LDLS PM: CPT | Performed by: INTERNAL MEDICINE

## 2019-01-15 PROCEDURE — 93296 REM INTERROG EVL PM/IDS: CPT | Performed by: INTERNAL MEDICINE

## 2019-02-06 ENCOUNTER — OFFICE VISIT (OUTPATIENT)
Dept: INTERNAL MEDICINE CLINIC | Age: 74
End: 2019-02-06
Payer: MEDICARE

## 2019-02-06 VITALS
TEMPERATURE: 98.2 F | HEART RATE: 66 BPM | BODY MASS INDEX: 29.02 KG/M2 | RESPIRATION RATE: 18 BRPM | DIASTOLIC BLOOD PRESSURE: 80 MMHG | WEIGHT: 214 LBS | OXYGEN SATURATION: 94 % | SYSTOLIC BLOOD PRESSURE: 168 MMHG

## 2019-02-06 DIAGNOSIS — J01.00 ACUTE NON-RECURRENT MAXILLARY SINUSITIS: Primary | ICD-10-CM

## 2019-02-06 DIAGNOSIS — I48.91 ATRIAL FIBRILLATION WITH SLOW VENTRICULAR RESPONSE (HCC): ICD-10-CM

## 2019-02-06 DIAGNOSIS — N28.9 RENAL INSUFFICIENCY: ICD-10-CM

## 2019-02-06 DIAGNOSIS — I25.10 CORONARY ARTERY DISEASE INVOLVING NATIVE CORONARY ARTERY OF NATIVE HEART WITHOUT ANGINA PECTORIS: ICD-10-CM

## 2019-02-06 DIAGNOSIS — I10 ESSENTIAL HYPERTENSION: ICD-10-CM

## 2019-02-06 LAB
ALBUMIN SERPL-MCNC: 4.2 G/DL (ref 3.4–5)
ANION GAP SERPL CALCULATED.3IONS-SCNC: 12 MMOL/L (ref 3–16)
BUN BLDV-MCNC: 9 MG/DL (ref 7–20)
CALCIUM SERPL-MCNC: 9.8 MG/DL (ref 8.3–10.6)
CHLORIDE BLD-SCNC: 105 MMOL/L (ref 99–110)
CO2: 27 MMOL/L (ref 21–32)
CREAT SERPL-MCNC: 1.3 MG/DL (ref 0.8–1.3)
GFR AFRICAN AMERICAN: >60
GFR NON-AFRICAN AMERICAN: 54
GLUCOSE BLD-MCNC: 136 MG/DL (ref 70–99)
PHOSPHORUS: 3 MG/DL (ref 2.5–4.9)
POTASSIUM SERPL-SCNC: 4.3 MMOL/L (ref 3.5–5.1)
SODIUM BLD-SCNC: 144 MMOL/L (ref 136–145)

## 2019-02-06 PROCEDURE — 4040F PNEUMOC VAC/ADMIN/RCVD: CPT | Performed by: INTERNAL MEDICINE

## 2019-02-06 PROCEDURE — 36415 COLL VENOUS BLD VENIPUNCTURE: CPT | Performed by: INTERNAL MEDICINE

## 2019-02-06 PROCEDURE — 1123F ACP DISCUSS/DSCN MKR DOCD: CPT | Performed by: INTERNAL MEDICINE

## 2019-02-06 PROCEDURE — 99214 OFFICE O/P EST MOD 30 MIN: CPT | Performed by: INTERNAL MEDICINE

## 2019-02-06 PROCEDURE — G8419 CALC BMI OUT NRM PARAM NOF/U: HCPCS | Performed by: INTERNAL MEDICINE

## 2019-02-06 PROCEDURE — 1101F PT FALLS ASSESS-DOCD LE1/YR: CPT | Performed by: INTERNAL MEDICINE

## 2019-02-06 PROCEDURE — G8482 FLU IMMUNIZE ORDER/ADMIN: HCPCS | Performed by: INTERNAL MEDICINE

## 2019-02-06 PROCEDURE — G8598 ASA/ANTIPLAT THER USED: HCPCS | Performed by: INTERNAL MEDICINE

## 2019-02-06 PROCEDURE — 3017F COLORECTAL CA SCREEN DOC REV: CPT | Performed by: INTERNAL MEDICINE

## 2019-02-06 PROCEDURE — G8427 DOCREV CUR MEDS BY ELIG CLIN: HCPCS | Performed by: INTERNAL MEDICINE

## 2019-02-06 PROCEDURE — 1036F TOBACCO NON-USER: CPT | Performed by: INTERNAL MEDICINE

## 2019-02-06 RX ORDER — LISINOPRIL 20 MG/1
20 TABLET ORAL 2 TIMES DAILY
Qty: 180 TABLET | Refills: 0
Start: 2019-02-06 | End: 2019-03-11 | Stop reason: SDUPTHER

## 2019-02-06 RX ORDER — LISINOPRIL 40 MG/1
40 TABLET ORAL DAILY
Qty: 90 TABLET | Refills: 1 | Status: SHIPPED | OUTPATIENT
Start: 2019-02-06 | End: 2019-02-06 | Stop reason: ALTCHOICE

## 2019-02-06 RX ORDER — AMOXICILLIN 875 MG/1
875 TABLET, COATED ORAL 2 TIMES DAILY
Qty: 20 TABLET | Refills: 0 | Status: SHIPPED | OUTPATIENT
Start: 2019-02-06 | End: 2019-02-16

## 2019-02-06 ASSESSMENT — PATIENT HEALTH QUESTIONNAIRE - PHQ9
SUM OF ALL RESPONSES TO PHQ QUESTIONS 1-9: 0
2. FEELING DOWN, DEPRESSED OR HOPELESS: 0
1. LITTLE INTEREST OR PLEASURE IN DOING THINGS: 0
SUM OF ALL RESPONSES TO PHQ9 QUESTIONS 1 & 2: 0
SUM OF ALL RESPONSES TO PHQ QUESTIONS 1-9: 0

## 2019-02-09 ASSESSMENT — ENCOUNTER SYMPTOMS
ABDOMINAL PAIN: 0
SINUS PRESSURE: 1
CONSTIPATION: 0
COUGH: 0
SORE THROAT: 0
ANAL BLEEDING: 0
NAUSEA: 0
VOMITING: 0
DIARRHEA: 0
BACK PAIN: 0
SHORTNESS OF BREATH: 0

## 2019-03-11 DIAGNOSIS — I10 ESSENTIAL HYPERTENSION: ICD-10-CM

## 2019-03-11 RX ORDER — LISINOPRIL 20 MG/1
TABLET ORAL
Qty: 90 TABLET | Refills: 1 | Status: SHIPPED | OUTPATIENT
Start: 2019-03-11 | End: 2019-11-20 | Stop reason: SDUPTHER

## 2019-03-19 ENCOUNTER — OFFICE VISIT (OUTPATIENT)
Dept: INTERNAL MEDICINE CLINIC | Age: 74
End: 2019-03-19
Payer: MEDICARE

## 2019-03-19 VITALS
BODY MASS INDEX: 30.49 KG/M2 | HEART RATE: 74 BPM | TEMPERATURE: 97.5 F | SYSTOLIC BLOOD PRESSURE: 148 MMHG | DIASTOLIC BLOOD PRESSURE: 64 MMHG | WEIGHT: 224.8 LBS | RESPIRATION RATE: 16 BRPM | OXYGEN SATURATION: 99 %

## 2019-03-19 DIAGNOSIS — L02.213 ABSCESS OF CHEST WALL: Primary | ICD-10-CM

## 2019-03-19 PROCEDURE — G8598 ASA/ANTIPLAT THER USED: HCPCS | Performed by: INTERNAL MEDICINE

## 2019-03-19 PROCEDURE — 99213 OFFICE O/P EST LOW 20 MIN: CPT | Performed by: INTERNAL MEDICINE

## 2019-03-19 PROCEDURE — G8417 CALC BMI ABV UP PARAM F/U: HCPCS | Performed by: INTERNAL MEDICINE

## 2019-03-19 PROCEDURE — 3017F COLORECTAL CA SCREEN DOC REV: CPT | Performed by: INTERNAL MEDICINE

## 2019-03-19 PROCEDURE — 1036F TOBACCO NON-USER: CPT | Performed by: INTERNAL MEDICINE

## 2019-03-19 PROCEDURE — G8482 FLU IMMUNIZE ORDER/ADMIN: HCPCS | Performed by: INTERNAL MEDICINE

## 2019-03-19 PROCEDURE — 4040F PNEUMOC VAC/ADMIN/RCVD: CPT | Performed by: INTERNAL MEDICINE

## 2019-03-19 PROCEDURE — 1101F PT FALLS ASSESS-DOCD LE1/YR: CPT | Performed by: INTERNAL MEDICINE

## 2019-03-19 PROCEDURE — 1123F ACP DISCUSS/DSCN MKR DOCD: CPT | Performed by: INTERNAL MEDICINE

## 2019-03-19 PROCEDURE — G8427 DOCREV CUR MEDS BY ELIG CLIN: HCPCS | Performed by: INTERNAL MEDICINE

## 2019-03-19 RX ORDER — DOXYCYCLINE HYCLATE 100 MG
100 TABLET ORAL 2 TIMES DAILY
Qty: 20 TABLET | Refills: 0 | Status: SHIPPED | OUTPATIENT
Start: 2019-03-19 | End: 2019-03-29

## 2019-03-25 ASSESSMENT — ENCOUNTER SYMPTOMS
DIARRHEA: 0
ABDOMINAL PAIN: 0
COUGH: 0
SHORTNESS OF BREATH: 0
VOMITING: 0
COLOR CHANGE: 1
NAUSEA: 0

## 2019-04-23 ENCOUNTER — NURSE ONLY (OUTPATIENT)
Dept: CARDIOLOGY CLINIC | Age: 74
End: 2019-04-23
Payer: MEDICARE

## 2019-04-23 DIAGNOSIS — Z95.0 PACEMAKER: Primary | ICD-10-CM

## 2019-04-23 DIAGNOSIS — R00.1 BRADYCARDIA WITH 31-40 BEATS PER MINUTE: ICD-10-CM

## 2019-04-23 PROCEDURE — 93294 REM INTERROG EVL PM/LDLS PM: CPT | Performed by: INTERNAL MEDICINE

## 2019-04-23 PROCEDURE — 93296 REM INTERROG EVL PM/IDS: CPT | Performed by: INTERNAL MEDICINE

## 2019-04-23 NOTE — LETTER
[address] [phone] [address] [facility],[address] Floor [phone]    Pacemaker/Defibrillator Clinic          04/26/19        [address] [address]        Dear [facility]    This letter is to inform you that we received the transmission from your monitor at home that checks your pacemaker and/or defibrillator, or implanted heart monitor. The next date you should transmit will be 7/30/19. Please be aware that the remote device transmission sites are periodically monitored only during regular business hours during which simultaneous in-office device clinics are being run. If your transmission requires attention, we will contact you as soon as possible. Thank you.             [facility]

## 2019-07-30 ENCOUNTER — NURSE ONLY (OUTPATIENT)
Dept: CARDIOLOGY CLINIC | Age: 74
End: 2019-07-30
Payer: MEDICARE

## 2019-07-30 DIAGNOSIS — R00.1 BRADYCARDIA WITH 31-40 BEATS PER MINUTE: ICD-10-CM

## 2019-07-30 DIAGNOSIS — Z95.0 PACEMAKER: ICD-10-CM

## 2019-07-30 PROCEDURE — 93294 REM INTERROG EVL PM/LDLS PM: CPT | Performed by: INTERNAL MEDICINE

## 2019-07-30 PROCEDURE — 93296 REM INTERROG EVL PM/IDS: CPT | Performed by: INTERNAL MEDICINE

## 2019-07-30 NOTE — LETTER
[address] [facility] [phone]    Pacemaker/Defibrillator Clinic          07/31/19        [address] [address]        Dear Polo Crain    This letter is to inform you that we received the transmission from your monitor at home that checks your pacemaker and/or defibrillator, or implanted heart monitor. The next date your monitor will automatically transmit will be 11/5/19. Your device and monitor are wireless and most transmit cellularly, but please periodically check your monitor is still plugged in to the electrical outlet. If you still use the telephone land line to send please ensure the connection to the phone luis is secure. This will help to ensure successful automatic transmissions in the future. Also, the monitor needs to be close to you while sleeping at night. Please be aware that the remote device transmission sites are periodically monitored only during regular business hours during which simultaneous in-office device clinics are being run. If your transmission requires attention, we will contact you as soon as possible. Also, our records indicate that it has been over a year since your last in-office device interrogation. Please contact our office to schedule an appointment with our device clinic. Thank you.             Keke Schwarz

## 2019-08-15 ENCOUNTER — OFFICE VISIT (OUTPATIENT)
Dept: INTERNAL MEDICINE CLINIC | Age: 74
End: 2019-08-15
Payer: MEDICARE

## 2019-08-15 VITALS
OXYGEN SATURATION: 97 % | HEART RATE: 72 BPM | TEMPERATURE: 98.4 F | DIASTOLIC BLOOD PRESSURE: 70 MMHG | BODY MASS INDEX: 29.19 KG/M2 | RESPIRATION RATE: 16 BRPM | WEIGHT: 215.2 LBS | SYSTOLIC BLOOD PRESSURE: 136 MMHG

## 2019-08-15 DIAGNOSIS — R21 RASH AND NONSPECIFIC SKIN ERUPTION: ICD-10-CM

## 2019-08-15 DIAGNOSIS — I25.10 CORONARY ARTERY DISEASE INVOLVING NATIVE CORONARY ARTERY OF NATIVE HEART WITHOUT ANGINA PECTORIS: ICD-10-CM

## 2019-08-15 DIAGNOSIS — I10 ESSENTIAL HYPERTENSION: ICD-10-CM

## 2019-08-15 DIAGNOSIS — R73.03 PRE-DIABETES: ICD-10-CM

## 2019-08-15 DIAGNOSIS — J31.0 CHRONIC RHINITIS: Primary | ICD-10-CM

## 2019-08-15 DIAGNOSIS — D12.6 ADENOMATOUS POLYP OF COLON, UNSPECIFIED PART OF COLON: ICD-10-CM

## 2019-08-15 DIAGNOSIS — I48.91 ATRIAL FIBRILLATION WITH SLOW VENTRICULAR RESPONSE (HCC): ICD-10-CM

## 2019-08-15 LAB
A/G RATIO: 2.1 (ref 1.1–2.2)
ALBUMIN SERPL-MCNC: 4.6 G/DL (ref 3.4–5)
ALP BLD-CCNC: 104 U/L (ref 40–129)
ALT SERPL-CCNC: 19 U/L (ref 10–40)
ANION GAP SERPL CALCULATED.3IONS-SCNC: 13 MMOL/L (ref 3–16)
AST SERPL-CCNC: 30 U/L (ref 15–37)
BILIRUB SERPL-MCNC: 1.3 MG/DL (ref 0–1)
BUN BLDV-MCNC: 10 MG/DL (ref 7–20)
CALCIUM SERPL-MCNC: 10.1 MG/DL (ref 8.3–10.6)
CHLORIDE BLD-SCNC: 101 MMOL/L (ref 99–110)
CHOLESTEROL, TOTAL: 138 MG/DL (ref 0–199)
CO2: 27 MMOL/L (ref 21–32)
CREAT SERPL-MCNC: 1.4 MG/DL (ref 0.8–1.3)
GFR AFRICAN AMERICAN: 60
GFR NON-AFRICAN AMERICAN: 49
GLOBULIN: 2.2 G/DL
GLUCOSE BLD-MCNC: 101 MG/DL (ref 70–99)
HDLC SERPL-MCNC: 44 MG/DL (ref 40–60)
LDL CHOLESTEROL CALCULATED: 65 MG/DL
POTASSIUM SERPL-SCNC: 4.6 MMOL/L (ref 3.5–5.1)
SODIUM BLD-SCNC: 141 MMOL/L (ref 136–145)
TOTAL PROTEIN: 6.8 G/DL (ref 6.4–8.2)
TRIGL SERPL-MCNC: 143 MG/DL (ref 0–150)
VLDLC SERPL CALC-MCNC: 29 MG/DL

## 2019-08-15 PROCEDURE — 3017F COLORECTAL CA SCREEN DOC REV: CPT | Performed by: INTERNAL MEDICINE

## 2019-08-15 PROCEDURE — G8598 ASA/ANTIPLAT THER USED: HCPCS | Performed by: INTERNAL MEDICINE

## 2019-08-15 PROCEDURE — 1123F ACP DISCUSS/DSCN MKR DOCD: CPT | Performed by: INTERNAL MEDICINE

## 2019-08-15 PROCEDURE — G8427 DOCREV CUR MEDS BY ELIG CLIN: HCPCS | Performed by: INTERNAL MEDICINE

## 2019-08-15 PROCEDURE — G8417 CALC BMI ABV UP PARAM F/U: HCPCS | Performed by: INTERNAL MEDICINE

## 2019-08-15 PROCEDURE — 36415 COLL VENOUS BLD VENIPUNCTURE: CPT | Performed by: INTERNAL MEDICINE

## 2019-08-15 PROCEDURE — 99214 OFFICE O/P EST MOD 30 MIN: CPT | Performed by: INTERNAL MEDICINE

## 2019-08-15 PROCEDURE — 1036F TOBACCO NON-USER: CPT | Performed by: INTERNAL MEDICINE

## 2019-08-15 PROCEDURE — 4040F PNEUMOC VAC/ADMIN/RCVD: CPT | Performed by: INTERNAL MEDICINE

## 2019-08-15 RX ORDER — FLUTICASONE PROPIONATE 50 MCG
2 SPRAY, SUSPENSION (ML) NASAL DAILY
Qty: 3 BOTTLE | Refills: 1 | Status: SHIPPED | OUTPATIENT
Start: 2019-08-15

## 2019-08-15 RX ORDER — CLOBETASOL PROPIONATE 0.5 MG/G
CREAM TOPICAL
Qty: 60 G | Refills: 0 | Status: SHIPPED | OUTPATIENT
Start: 2019-08-15

## 2019-08-15 RX ORDER — CETIRIZINE HYDROCHLORIDE 10 MG/1
10 TABLET ORAL DAILY
Qty: 30 TABLET | Refills: 3 | Status: SHIPPED | OUTPATIENT
Start: 2019-08-15 | End: 2019-09-14

## 2019-08-15 NOTE — PROGRESS NOTES
breath sounds normal. He exhibits no tenderness. Abdominal: Soft. He exhibits no distension. There is no tenderness. No HSM   Musculoskeletal: He exhibits no edema. Lymphadenopathy:     He has no cervical adenopathy. Neurological: He is alert and oriented to person, place, and time. Coordination normal.   Skin: Skin is warm and dry. Rash noted. Psychiatric: He has a normal mood and affect. His behavior is normal.   Nursing note and vitals reviewed. ASSESSMENT/PLAN:    Bibiana Houser was seen today for hypertension, tinnitus and shoulder pain. Diagnoses and all orders for this visit:    Chronic rhinitis  -     cetirizine (ZYRTEC) 10 MG tablet; Take 1 tablet by mouth daily  -     fluticasone (FLONASE) 50 MCG/ACT nasal spray; 2 sprays by Each Nostril route daily    Essential hypertension  -     Comprehensive Metabolic Panel    Atrial fibrillation with slow ventricular response (HCC)    Coronary artery disease involving native coronary artery of native heart without angina pectoris  -     Lipid Panel    Pre-diabetes  -     Hemoglobin A1C    Adenomatous polyp of colon, unspecified part of colon  -     AFL - Cruz Beckham MD, Gastroenterology, [facility]    Rash and nonspecific skin eruption  -     clobetasol (TEMOVATE) 0.05 % cream; Apply topically 2 times daily. Vaccine needs   -     zoster recombinant adjuvanted vaccine [facility]) 50 MCG/0.5ML SUSR injection; Inject 0.5 mLs into the muscle See Admin Instructions 1 dose now and repeat in 2-6 months        Orders Placed This Encounter   Medications    cetirizine (ZYRTEC) 10 MG tablet     Sig: Take 1 tablet by mouth daily     Dispense:  30 tablet     Refill:  3    fluticasone (FLONASE) 50 MCG/ACT nasal spray     Si sprays by Each Nostril route daily     Dispense:  3 Bottle     Refill:  1    clobetasol (TEMOVATE) 0.05 % cream     Sig: Apply topically 2 times daily.      Dispense:  60 g     Refill:  0    zoster recombinant adjuvanted vaccine

## 2019-08-16 LAB
ESTIMATED AVERAGE GLUCOSE: 122.6 MG/DL
HBA1C MFR BLD: 5.9 %

## 2019-08-25 ASSESSMENT — ENCOUNTER SYMPTOMS
COUGH: 0
BACK PAIN: 0
ABDOMINAL PAIN: 0
VOMITING: 0
EYE ITCHING: 1
CONSTIPATION: 0
SINUS PRESSURE: 0
DIARRHEA: 0
NAUSEA: 0
EYE DISCHARGE: 1
SHORTNESS OF BREATH: 0

## 2019-10-04 ENCOUNTER — OFFICE VISIT (OUTPATIENT)
Dept: CARDIOLOGY CLINIC | Age: 74
End: 2019-10-04
Payer: MEDICARE

## 2019-10-04 ENCOUNTER — NURSE ONLY (OUTPATIENT)
Dept: CARDIOLOGY CLINIC | Age: 74
End: 2019-10-04
Payer: MEDICARE

## 2019-10-04 VITALS
HEIGHT: 72 IN | HEART RATE: 67 BPM | BODY MASS INDEX: 29.53 KG/M2 | SYSTOLIC BLOOD PRESSURE: 130 MMHG | WEIGHT: 218 LBS | OXYGEN SATURATION: 99 % | DIASTOLIC BLOOD PRESSURE: 80 MMHG

## 2019-10-04 DIAGNOSIS — E78.00 HYPERCHOLESTEROLEMIA: ICD-10-CM

## 2019-10-04 DIAGNOSIS — I10 ESSENTIAL HYPERTENSION: ICD-10-CM

## 2019-10-04 DIAGNOSIS — I25.10 CAD IN NATIVE ARTERY: ICD-10-CM

## 2019-10-04 DIAGNOSIS — R00.1 BRADYCARDIA WITH 31-40 BEATS PER MINUTE: ICD-10-CM

## 2019-10-04 DIAGNOSIS — Z95.0 PACEMAKER: ICD-10-CM

## 2019-10-04 DIAGNOSIS — I48.20 CHRONIC A-FIB (HCC): Primary | ICD-10-CM

## 2019-10-04 PROCEDURE — 3017F COLORECTAL CA SCREEN DOC REV: CPT | Performed by: INTERNAL MEDICINE

## 2019-10-04 PROCEDURE — 99214 OFFICE O/P EST MOD 30 MIN: CPT | Performed by: INTERNAL MEDICINE

## 2019-10-04 PROCEDURE — 1036F TOBACCO NON-USER: CPT | Performed by: INTERNAL MEDICINE

## 2019-10-04 PROCEDURE — 1123F ACP DISCUSS/DSCN MKR DOCD: CPT | Performed by: INTERNAL MEDICINE

## 2019-10-04 PROCEDURE — 93279 PRGRMG DEV EVAL PM/LDLS PM: CPT | Performed by: INTERNAL MEDICINE

## 2019-10-04 PROCEDURE — G8598 ASA/ANTIPLAT THER USED: HCPCS | Performed by: INTERNAL MEDICINE

## 2019-10-04 PROCEDURE — G8484 FLU IMMUNIZE NO ADMIN: HCPCS | Performed by: INTERNAL MEDICINE

## 2019-10-04 PROCEDURE — G8427 DOCREV CUR MEDS BY ELIG CLIN: HCPCS | Performed by: INTERNAL MEDICINE

## 2019-10-04 PROCEDURE — G8417 CALC BMI ABV UP PARAM F/U: HCPCS | Performed by: INTERNAL MEDICINE

## 2019-10-04 PROCEDURE — 4040F PNEUMOC VAC/ADMIN/RCVD: CPT | Performed by: INTERNAL MEDICINE

## 2019-10-25 LAB
A/G RATIO: 1.7 (ref 1.1–2.2)
ALBUMIN SERPL-MCNC: 4.3 G/DL (ref 3.4–5)
ALP BLD-CCNC: 95 U/L (ref 40–129)
ALT SERPL-CCNC: 18 U/L (ref 10–40)
ANION GAP SERPL CALCULATED.3IONS-SCNC: 14 MMOL/L (ref 3–16)
ANTI-NUCLEAR ANTIBODY (ANA): NEGATIVE
AST SERPL-CCNC: 32 U/L (ref 15–37)
BASOPHILS ABSOLUTE: 0 K/UL (ref 0–0.2)
BASOPHILS RELATIVE PERCENT: 0.8 %
BILIRUB SERPL-MCNC: 1.3 MG/DL (ref 0–1)
BUN BLDV-MCNC: 10 MG/DL (ref 7–20)
C3 COMPLEMENT: 127.1 MG/DL (ref 90–180)
C4 COMPLEMENT: 23.5 MG/DL (ref 10–40)
CALCIUM SERPL-MCNC: 9.9 MG/DL (ref 8.3–10.6)
CHLORIDE BLD-SCNC: 104 MMOL/L (ref 99–110)
CO2: 25 MMOL/L (ref 21–32)
CREAT SERPL-MCNC: 1.2 MG/DL (ref 0.8–1.3)
EOSINOPHILS ABSOLUTE: 0.5 K/UL (ref 0–0.6)
EOSINOPHILS RELATIVE PERCENT: 8.6 %
GFR AFRICAN AMERICAN: >60
GFR NON-AFRICAN AMERICAN: 59
GLOBULIN: 2.6 G/DL
GLUCOSE BLD-MCNC: 94 MG/DL (ref 70–99)
HCT VFR BLD CALC: 42 % (ref 40.5–52.5)
HEMOGLOBIN: 14.3 G/DL (ref 13.5–17.5)
LYMPHOCYTES ABSOLUTE: 1.8 K/UL (ref 1–5.1)
LYMPHOCYTES RELATIVE PERCENT: 32.8 %
MCH RBC QN AUTO: 32 PG (ref 26–34)
MCHC RBC AUTO-ENTMCNC: 34 G/DL (ref 31–36)
MCV RBC AUTO: 94.2 FL (ref 80–100)
MONOCYTES ABSOLUTE: 0.4 K/UL (ref 0–1.3)
MONOCYTES RELATIVE PERCENT: 7.2 %
NEUTROPHILS ABSOLUTE: 2.9 K/UL (ref 1.7–7.7)
NEUTROPHILS RELATIVE PERCENT: 50.6 %
PDW BLD-RTO: 14.2 % (ref 12.4–15.4)
PLATELET # BLD: 160 K/UL (ref 135–450)
PMV BLD AUTO: 9.6 FL (ref 5–10.5)
POTASSIUM SERPL-SCNC: 4.4 MMOL/L (ref 3.5–5.1)
RBC # BLD: 4.46 M/UL (ref 4.2–5.9)
SEDIMENTATION RATE, ERYTHROCYTE: 8 MM/HR (ref 0–20)
SODIUM BLD-SCNC: 143 MMOL/L (ref 136–145)
TOTAL PROTEIN: 6.9 G/DL (ref 6.4–8.2)
VITAMIN D 25-HYDROXY: 42.3 NG/ML
WBC # BLD: 5.6 K/UL (ref 4–11)

## 2019-10-27 LAB — IGE: 250 KU/L

## 2019-10-31 LAB — C1Q BINDING: 3.8 UG EQ/ML (ref 0–3.9)

## 2019-11-05 ENCOUNTER — NURSE ONLY (OUTPATIENT)
Dept: CARDIOLOGY CLINIC | Age: 74
End: 2019-11-05
Payer: MEDICARE

## 2019-11-05 DIAGNOSIS — Z95.0 PACEMAKER: ICD-10-CM

## 2019-11-05 DIAGNOSIS — R00.1 BRADYCARDIA WITH 31-40 BEATS PER MINUTE: ICD-10-CM

## 2019-11-05 PROCEDURE — 93296 REM INTERROG EVL PM/IDS: CPT | Performed by: INTERNAL MEDICINE

## 2019-11-05 PROCEDURE — 93294 REM INTERROG EVL PM/LDLS PM: CPT | Performed by: INTERNAL MEDICINE

## 2019-11-20 DIAGNOSIS — I10 ESSENTIAL HYPERTENSION: ICD-10-CM

## 2019-11-20 RX ORDER — LISINOPRIL 20 MG/1
TABLET ORAL
Qty: 90 TABLET | Refills: 3 | Status: SHIPPED | OUTPATIENT
Start: 2019-11-20 | End: 2020-11-02

## 2019-12-27 RX ORDER — ATORVASTATIN CALCIUM 40 MG/1
TABLET, FILM COATED ORAL
Qty: 90 TABLET | Refills: 3 | Status: SHIPPED | OUTPATIENT
Start: 2019-12-27 | End: 2020-11-23

## 2020-08-15 PROCEDURE — 93296 REM INTERROG EVL PM/IDS: CPT | Performed by: INTERNAL MEDICINE

## 2020-08-15 PROCEDURE — 93294 REM INTERROG EVL PM/LDLS PM: CPT | Performed by: INTERNAL MEDICINE

## 2020-08-15 NOTE — PROGRESS NOTES
We received remote transmission from patient's monitor at home. Transmission shows normal sensing and pacing function. EP physician will review. See interrogation under cardiology tab in the [facility] [address] field for more details. Follow up in 3 months via carelink.

## 2020-08-17 ENCOUNTER — NURSE ONLY (OUTPATIENT)
Dept: CARDIOLOGY CLINIC | Age: 75
End: 2020-08-17
Payer: MEDICARE

## 2020-08-17 NOTE — LETTER
[address] [facility] [phone]  [address] [place] [address]    Pacemaker/Defibrillator Clinic          08/15/20        [address] [address]        Dear Tresa Khan    This letter is to inform you that we received the transmission from your monitor at home that checks your implanted heart device. The next date you should transmit will be 11/17. If your report requires attention, we will notify you. Please be aware that the remote device transmission sites are periodically monitored only during regular business hours during which simultaneous in-office device clinics are being run. If your transmission requires attention, we will contact you as soon as possible. Thank you.             [address]

## 2020-10-12 ENCOUNTER — NURSE ONLY (OUTPATIENT)
Dept: CARDIOLOGY CLINIC | Age: 75
End: 2020-10-12
Payer: MEDICARE

## 2020-10-12 ENCOUNTER — OFFICE VISIT (OUTPATIENT)
Dept: CARDIOLOGY CLINIC | Age: 75
End: 2020-10-12
Payer: MEDICARE

## 2020-10-12 VITALS
SYSTOLIC BLOOD PRESSURE: 162 MMHG | HEART RATE: 73 BPM | WEIGHT: 215 LBS | HEIGHT: 72 IN | TEMPERATURE: 97.1 F | OXYGEN SATURATION: 99 % | BODY MASS INDEX: 29.12 KG/M2 | DIASTOLIC BLOOD PRESSURE: 90 MMHG

## 2020-10-12 PROCEDURE — 99214 OFFICE O/P EST MOD 30 MIN: CPT | Performed by: INTERNAL MEDICINE

## 2020-10-12 PROCEDURE — 93279 PRGRMG DEV EVAL PM/LDLS PM: CPT | Performed by: INTERNAL MEDICINE

## 2020-10-12 NOTE — PROGRESS NOTES
Pt seen in clinic today for cardiac device interrogation. Their device is a MDT single chamber ppm  Based on threshold, impedance, and intrinsic sensing tests run today, the device appears to be functioning normally. Remaining battery life is 6y11m                  98.05%      Rx:   apixaban (ELIQUIS) 5 MG TABS tablet TAKE 1 TABLET BY MOUTH TWO TIMES DAILY     Pt was informed of findings today and general questions have been answered with regard to device. Home monitoring hardware is transmitting on schedule. Pt to see Dr. Valerio Fortune in clinic today following their device check. Pt seen in clinic today for cardiac device interrogation. Their device is a MDT single chamber ppm  Based on threshold, impedance, and intrinsic sensing tests run today, the device appears to be functioning normally. Remaining battery life is 6y11m                  98.05%      Rx:   apixaban (ELIQUIS) 5 MG TABS tablet TAKE 1 TABLET BY MOUTH TWO TIMES DAILY     Pt was informed of findings today and general questions have been answered with regard to device. Home monitoring hardware is transmitting on schedule. Pt to see Dr. Valerio Fortnue in clinic today following their device check.

## 2020-10-12 NOTE — PROGRESS NOTES
[facility]   Office Progress Note             Kennedi Cisse MD       HPI:  Patient with history of HLD, HTN, CAD s/p stents to the LAD in 2008; New onset AF in 7/27/16 . Later developed symptomatic bradycardia, experienced falls, dizziness, lightheadedness. He underwent PPM implant on 6/20/18 ([place] [address]). Wears bilateral hearing aids. Here today for routine f/u. Accompanied by his son. Has no new cardiac complaints. Lives home with his wife. Stays busy mowing grass and working outside. Takes all medication as prescribed. Today, specifically denies any chest pain, pressure, tightness, nausea, vomiting, diaphoresis, SOB/BAEZ, palpitations, heart racing, dizziness/lightheadedness, orthopnea, PND, LE edema or syncope. Physical Examination:    BP (!) 162/90   Pulse 73   Temp 97.1 °F (36.2 °C)   Ht 6' (1.829 m)   Wt 215 lb (97.5 kg)   SpO2 99%   BMI 29.16 kg/m²    HEENT:  Face: Atraumatic, Conjunctiva: Pink; non icteric,  Mucous Memb:  Moist, No thyromegaly or Lymphadenopathy  Respiratory:  Resp Assessment: normal, Resp Auscultation: clear   Cardiovascular: Auscultation: nl S1 & S2, Palpation:  Nl PMI;  No heaves or thrills, JVP:  normal  Abdomen: Soft, non-tender, Normal bowel sounds,  No organomegaly  Extremities: No Cyanosis or Clubbing; Edema none  Neurological: Oriented to time, place, and person, Non-anxious  Psychiatric: Normal mood and affect  Skin: Warm and dry,  No rash seen    Current Outpatient Medications   Medication Sig Dispense Refill    apixaban (ELIQUIS) 5 MG TABS tablet TAKE 1 TABLET BY MOUTH TWO  TIMES DAILY 180 tablet 3    atorvastatin (LIPITOR) 40 MG tablet TAKE 1 TABLET BY MOUTH  NIGHTLY 90 tablet 3    lisinopril (PRINIVIL;ZESTRIL) 20 MG tablet TAKE 1 TABLET BY MOUTH  DAILY 90 tablet 3    diphenhydrAMINE-Zinc Acetate (BENADRYL ITCH RELIEF EX) Apply topically as needed      fluticasone (FLONASE) 50 MCG/ACT nasal spray 2 sprays by Each Nostril route daily 3 Bottle 1    clobetasol (TEMOVATE) 0.05 % cream Apply topically 2 times daily. 60 g 0    saw palmetto 160 MG capsule Take 160 mg by mouth daily.  nitroGLYCERIN (NITROSTAT) 0.4 MG SL tablet Place 0.4 mg under the tongue every 5 minutes as needed.  Glucosamine-Chondroit-Vit C-Mn (GLUCOSAMINE CHONDROITIN COMPLX) TABS Take 1 tablet by mouth daily. No current facility-administered medications for this visit. ECG:    10/4/19 device interrogated today   10/12/20 ventricular pacemaker       ECHO 8/2/16  Concentric LVH with normal systolic function. EF is 60% The left atrium is at the upper limits of normal in size. Trivial mitral, aortic and tricuspid regurgitation is present. Normal right ventricular size and function.       Stress MPI: 12/8/2008  Large sized severe apical, anteroseptal and septal completely reversible defect consistent with ischemia in the territory typical of the proximal LAD. Corornary angiogram  & Intervention: 2008  Single vessel CAD involving LAD  Normal LV function  S/p multiple stents in the LAD      Assessment/Plan:      Chronic Atrial Fibrillation  V paced on ECG  Rate controlled without BB  CHads Vasc at least 2 (Age, HTN)  Continue Eliquis 5 mg BID    CAD  S/p stent LAD   No angina  No ASA secondary to Eliquis for Afib     Essential Hypertension  BP is stable for age   Continue medical management     Hypercholesterolemia  Continue statin therapy     PPM  S/p 6/20/18  Medtronic Micro Leadless   Device interrogated today and functioning normally     Follow up in 1 year. Thank you very much for allowing me to participate in the care of your patient. Please do not hesitate to contact me if you have any questions.     Sincerely,    Tarri Lesches M.D  [facility], [address] [place] [address]  Ph: [phone]  Fax: [phone]    This note was scribed in the presence of Dr. Tarri Lesches, MD by Aime Huynh Aub[address] 9  Physician Attestation:  The scribes documentation has been prepared under my direction and personally reviewed by me in its entirety. I confirm that the note above accurately reflects all work, treatment, procedures, and medical decision making performed by me.

## 2020-10-20 NOTE — PROGRESS NOTES
Vaccine Information Sheet, \"Influenza - Inactivated\"  given to Lev[place]and Speaker, or parent/legal guardian of  Levelland Speaker and verbalized understanding. Patient responses:    Have you ever had a reaction to a flu vaccine? No  Do you have any current illness? No  Have you ever had [place] Syndrome? No  Do you have a serious allergy to any of the follow: Neomycin, Polymyxin, Thimerosal, eggs or egg products? No    Flu vaccine given per order. Please see immunization tab. Risks and benefits explained. Current VIS given.

## 2020-10-21 ENCOUNTER — IMMUNIZATION (OUTPATIENT)
Dept: FAMILY MEDICINE CLINIC | Age: 75
End: 2020-10-21
Payer: MEDICARE

## 2020-10-21 PROCEDURE — 90694 VACC AIIV4 NO PRSRV 0.5ML IM: CPT | Performed by: INTERNAL MEDICINE

## 2020-10-21 PROCEDURE — G0008 ADMIN INFLUENZA VIRUS VAC: HCPCS | Performed by: INTERNAL MEDICINE

## 2020-10-27 ENCOUNTER — TELEPHONE (OUTPATIENT)
Dept: CARDIOLOGY CLINIC | Age: 75
End: 2020-10-27

## 2020-10-27 NOTE — TELEPHONE ENCOUNTER
Medication Samples    Medication:ELIQUIS    Dosage of the medication:5MG    How are you taking this medication (QD, BID, TID, QID, PRN):1 TABLETS TWICE DAILY     in the office or Mail to your home?

## 2020-11-02 RX ORDER — LISINOPRIL 20 MG/1
TABLET ORAL
Qty: 90 TABLET | Refills: 3 | Status: SHIPPED | OUTPATIENT
Start: 2020-11-02 | End: 2020-11-12 | Stop reason: DRUGHIGH

## 2020-11-12 ENCOUNTER — OFFICE VISIT (OUTPATIENT)
Dept: FAMILY MEDICINE CLINIC | Age: 75
End: 2020-11-12
Payer: MEDICARE

## 2020-11-12 VITALS
BODY MASS INDEX: 29.42 KG/M2 | OXYGEN SATURATION: 96 % | DIASTOLIC BLOOD PRESSURE: 86 MMHG | HEART RATE: 60 BPM | WEIGHT: 217.2 LBS | SYSTOLIC BLOOD PRESSURE: 156 MMHG | TEMPERATURE: 97.3 F | HEIGHT: 72 IN

## 2020-11-12 PROBLEM — I10 ESSENTIAL HYPERTENSION: Status: ACTIVE | Noted: 2020-11-12

## 2020-11-12 PROCEDURE — 4040F PNEUMOC VAC/ADMIN/RCVD: CPT | Performed by: INTERNAL MEDICINE

## 2020-11-12 PROCEDURE — G0439 PPPS, SUBSEQ VISIT: HCPCS | Performed by: INTERNAL MEDICINE

## 2020-11-12 PROCEDURE — 1123F ACP DISCUSS/DSCN MKR DOCD: CPT | Performed by: INTERNAL MEDICINE

## 2020-11-12 PROCEDURE — G8484 FLU IMMUNIZE NO ADMIN: HCPCS | Performed by: INTERNAL MEDICINE

## 2020-11-12 RX ORDER — LISINOPRIL 40 MG/1
40 TABLET ORAL DAILY
Qty: 30 TABLET | Refills: 1 | Status: SHIPPED | OUTPATIENT
Start: 2020-11-12 | End: 2021-01-20

## 2020-11-12 ASSESSMENT — ENCOUNTER SYMPTOMS
SINUS PAIN: 0
CONSTIPATION: 0
TROUBLE SWALLOWING: 0
BLOOD IN STOOL: 0
COUGH: 0
VOMITING: 0
DIARRHEA: 0
SHORTNESS OF BREATH: 0
ABDOMINAL PAIN: 0
NAUSEA: 0
BACK PAIN: 0

## 2020-11-12 ASSESSMENT — PATIENT HEALTH QUESTIONNAIRE - PHQ9
SUM OF ALL RESPONSES TO PHQ QUESTIONS 1-9: 0
2. FEELING DOWN, DEPRESSED OR HOPELESS: 0
1. LITTLE INTEREST OR PLEASURE IN DOING THINGS: 0
SUM OF ALL RESPONSES TO PHQ QUESTIONS 1-9: 0
SUM OF ALL RESPONSES TO PHQ QUESTIONS 1-9: 0
SUM OF ALL RESPONSES TO PHQ9 QUESTIONS 1 & 2: 0

## 2020-11-12 ASSESSMENT — LIFESTYLE VARIABLES: HOW OFTEN DO YOU HAVE A DRINK CONTAINING ALCOHOL: 0

## 2020-11-12 NOTE — PROGRESS NOTES
SUBJECTIVE:  Annette Pardo is a 67 y.o. male being evaluated for:    Chief Complaint   Patient presents with    Medicare AW       HPI Patient here for wellness feels fine   Here for wellness and is doing alright   Allergies   Allergen Reactions    Bee Venom Anaphylaxis     Current Outpatient Medications   Medication Sig Dispense Refill    lisinopril (PRINIVIL;ZESTRIL) 40 MG tablet Take 1 tablet by mouth daily 30 tablet 1    apixaban (ELIQUIS) 5 MG TABS tablet TAKE 1 TABLET BY MOUTH TWO  TIMES DAILY 180 tablet 3    atorvastatin (LIPITOR) 40 MG tablet TAKE 1 TABLET BY MOUTH  NIGHTLY 90 tablet 3    diphenhydrAMINE-Zinc Acetate (BENADRYL ITCH RELIEF EX) Apply topically as needed      fluticasone (FLONASE) 50 MCG/ACT nasal spray 2 sprays by Each Nostril route daily 3 Bottle 1    clobetasol (TEMOVATE) 0.05 % cream Apply topically 2 times daily. 60 g 0    saw palmetto 160 MG capsule Take 160 mg by mouth daily.  Glucosamine-Chondroit-Vit C-Mn (GLUCOSAMINE CHONDROITIN COMPLX) TABS Take 1 tablet by mouth daily.  nitroGLYCERIN (NITROSTAT) 0.4 MG SL tablet Place 0.4 mg under the tongue every 5 minutes as needed. No current facility-administered medications for this visit.           Social History     Socioeconomic History    Marital status:      Spouse name: Not on file    Number of children: Not on file    Years of education: Not on file    Highest education level: Not on file   Occupational History    Not on file   Social Needs    Financial resource strain: Not on file    Food insecurity     Worry: Not on file     Inability: Not on file    Transportation needs     Medical: Not on file     Non-medical: Not on file   Tobacco Use    Smoking status: Former Smoker     Packs/day: 0.50     Years: 7.00     Pack years: 3.50     Types: Cigarettes     Last attempt to quit: 1970     Years since quittin.8    Smokeless tobacco: Never Used   Substance and Sexual Activity    Alcohol use: No    Drug use: No    Sexual activity: Yes     Partners: Female   Lifestyle    Physical activity     Days per week: Not on file     Minutes per session: Not on file    Stress: Not on file   Relationships    Social connections     Talks on phone: Not on file     Gets together: Not on file     Attends Mu-ism service: Not on file     Active member of club or organization: Not on file     Attends meetings of clubs or organizations: Not on file     Relationship status: Not on file    Intimate partner violence     Fear of current or ex partner: Not on file     Emotionally abused: Not on file     Physically abused: Not on file     Forced sexual activity: Not on file   Other Topics Concern    Not on file   Social History Narrative    Not on file      Past Medical History:   Diagnosis Date    Atrial fibrillation ([facility] [address]     Basal cell carcinoma     right ear and right cheek    Bilateral tinnitus 2005    Bradycardia     required pacemaker     CAD (coronary artery disease)     4 stents placed     GERD (gastroesophageal reflux disease)     Hyperlipidemia     Hypertension     Osteoarthritis      Past Surgical History:   Procedure Laterality Date    A-V CARDIAC PACEMAKER INSERTION      bradycardia     CORONARY ANGIOPLASTY WITH STENT PLACEMENT  2008    EXTERNAL EAR SURGERY Right     removed basal cell cancer    SKIN CANCER DESTRUCTION  03/2017    Right cheek     Family History   Problem Relation Age of Onset    Arthritis Mother     Heart Disease Father     Stroke Father     Other Brother         couldnt walk and in nursing home  disable     No Known Problems Maternal Grandmother     High Blood Pressure Maternal Grandfather     No Known Problems Paternal Grandmother     No Known Problems Paternal Grandfather    ,  Review of Systems   Constitutional: Negative for activity change, fatigue and unexpected weight change. HENT: Positive for congestion and hearing loss.  Negative for ear pain, sinus pain and trouble swallowing. Eyes: Negative for visual disturbance (glasses ). Respiratory: Negative for cough and shortness of breath. Cardiovascular: Negative for chest pain and leg swelling. Gastrointestinal: Negative for abdominal pain, blood in stool, constipation, diarrhea, nausea and vomiting. Genitourinary: Negative for difficulty urinating and dysuria. Musculoskeletal: Negative for arthralgias and back pain. Skin:        No skin lesions and sees dermatologist     Neurological: Negative for dizziness, light-headedness and headaches. Psychiatric/Behavioral: Negative for dysphoric mood and sleep disturbance. OBJECTIVE:  BP (!) 156/86   Pulse 60   Temp 97.3 °F (36.3 °C) (Temporal)   Ht 6' (1.829 m)   Wt 217 lb 3.2 oz (98.5 kg)   SpO2 96%   BMI 29.46 kg/m²      Body mass index is 29.46 kg/m². Physical Exam  Vitals signs and nursing note reviewed. Constitutional:       General: He is not in acute distress. Appearance: Normal appearance. He is well-developed. HENT:      Head: Normocephalic and atraumatic. Right Ear: Tympanic membrane, ear canal and external ear normal.      Left Ear: Tympanic membrane, ear canal and external ear normal.      Nose: Nose normal.      Mouth/Throat:      Pharynx: Oropharynx is clear. Eyes:      Conjunctiva/sclera: Conjunctivae normal.   Neck:      Musculoskeletal: Neck supple. Thyroid: No thyromegaly. Vascular: No JVD. Cardiovascular:      Rate and Rhythm: Normal rate and regular rhythm. Heart sounds: Normal heart sounds. No murmur. No friction rub. No gallop. Pulmonary:      Effort: Pulmonary effort is normal.      Breath sounds: Normal breath sounds. Chest:      Chest wall: No tenderness. Abdominal:      General: There is no distension. Palpations: Abdomen is soft. Tenderness: There is no abdominal tenderness. Comments: No HSM   Musculoskeletal:         General: No swelling.    Lymphadenopathy: Cervical: No cervical adenopathy. Skin:     General: Skin is warm and dry. Neurological:      General: No focal deficit present. Mental Status: He is alert. Gait: Gait normal.   Psychiatric:         Behavior: Behavior normal.         Thought Content: Thought content normal.         ASSESSMENT/PLAN:    Bari Azevedo was seen today for medicare awv. Diagnoses and all orders for this visit:    Routine general medical examination at a health care facility    Atrial fibrillation with slow ventricular response ([facility] [address]  -     Comprehensive Metabolic Panel; Future    Essential hypertension bp up increase meds   -     lisinopril (PRINIVIL;ZESTRIL) 40 MG tablet; Take 1 tablet by mouth daily    Bradycardia with 31-40 beats per minute    Coronary artery disease involving native coronary artery of native heart without angina pectoris  -     Comprehensive Metabolic Panel; Future    Hypercholesterolemia  -     Lipid Panel; Future    Prostate cancer screening  -     Psa screening; Future          Orders Placed This Encounter   Medications    lisinopril (PRINIVIL;ZESTRIL) 40 MG tablet     Sig: Take 1 tablet by mouth daily     Dispense:  30 tablet     Refill:  1        Return in 4 months (on 3/12/2021), or if symptoms worsen or fail to improve, for blood pressure check up . Patient Instructions     Personalized Preventive Plan for Rafal Patel - 11/12/2020  Medicare offers a range of preventive health benefits. Some of the tests and screenings are paid in full while other may be subject to a deductible, co-insurance, and/or copay. Some of these benefits include a comprehensive review of your medical history including lifestyle, illnesses that may run in your family, and various assessments and screenings as appropriate. After reviewing your medical record and screening and assessments performed today your provider may have ordered immunizations, labs, imaging, and/or referrals for you.   A list of these orders

## 2020-11-12 NOTE — PATIENT INSTRUCTIONS
Personalized Preventive Plan for Ozzy Capps - 11/12/2020  Medicare offers a range of preventive health benefits. Some of the tests and screenings are paid in full while other may be subject to a deductible, co-insurance, and/or copay. Some of these benefits include a comprehensive review of your medical history including lifestyle, illnesses that may run in your family, and various assessments and screenings as appropriate. After reviewing your medical record and screening and assessments performed today your provider may have ordered immunizations, labs, imaging, and/or referrals for you. A list of these orders (if applicable) as well as your Preventive Care list are included within your After Visit Summary for your review. Other Preventive Recommendations:    · A preventive eye exam performed by an eye specialist is recommended every 1-2 years to screen for glaucoma; cataracts, macular degeneration, and other eye disorders. · A preventive dental visit is recommended every 6 months. · Try to get at least 150 minutes of exercise per week or 10,000 steps per day on a pedometer . · Order or download the FREE \"Exercise & Physical Activity: Your Everyday Guide\" from [employer]. Call 3-956.467.8958 or search [employer] online. · You need 7941-4928 mg of calcium and 1364-3069 IU of vitamin D per day. It is possible to meet your calcium requirement with diet alone, but a vitamin D supplement is usually necessary to meet this goal.  · When exposed to the sun, use a sunscreen that protects against both UVA and UVB radiation with an SPF of 30 or greater. Reapply every 2 to 3 hours or after sweating, drying off with a towel, or swimming. · Always wear a seat belt when traveling in a car. Always wear a helmet when riding a bicycle or motorcycle.

## 2020-11-18 DIAGNOSIS — I48.91 ATRIAL FIBRILLATION WITH SLOW VENTRICULAR RESPONSE (HCC): ICD-10-CM

## 2020-11-18 DIAGNOSIS — E78.00 HYPERCHOLESTEROLEMIA: ICD-10-CM

## 2020-11-18 DIAGNOSIS — Z12.5 PROSTATE CANCER SCREENING: ICD-10-CM

## 2020-11-18 DIAGNOSIS — I25.10 CORONARY ARTERY DISEASE INVOLVING NATIVE CORONARY ARTERY OF NATIVE HEART WITHOUT ANGINA PECTORIS: ICD-10-CM

## 2020-11-18 LAB
A/G RATIO: 1.4 (ref 1.1–2.2)
ALBUMIN SERPL-MCNC: 4 G/DL (ref 3.4–5)
ALP BLD-CCNC: 93 U/L (ref 40–129)
ALT SERPL-CCNC: 19 U/L (ref 10–40)
ANION GAP SERPL CALCULATED.3IONS-SCNC: 12 MMOL/L (ref 3–16)
AST SERPL-CCNC: 30 U/L (ref 15–37)
BILIRUB SERPL-MCNC: 1.7 MG/DL (ref 0–1)
BUN BLDV-MCNC: 14 MG/DL (ref 7–20)
CALCIUM SERPL-MCNC: 10.1 MG/DL (ref 8.3–10.6)
CHLORIDE BLD-SCNC: 104 MMOL/L (ref 99–110)
CHOLESTEROL, TOTAL: 138 MG/DL (ref 0–199)
CO2: 28 MMOL/L (ref 21–32)
CREAT SERPL-MCNC: 1.3 MG/DL (ref 0.8–1.3)
GFR AFRICAN AMERICAN: >60
GFR NON-AFRICAN AMERICAN: 54
GLOBULIN: 2.9 G/DL
GLUCOSE BLD-MCNC: 113 MG/DL (ref 70–99)
HDLC SERPL-MCNC: 45 MG/DL (ref 40–60)
LDL CHOLESTEROL CALCULATED: 69 MG/DL
POTASSIUM SERPL-SCNC: 4.1 MMOL/L (ref 3.5–5.1)
PROSTATE SPECIFIC ANTIGEN: 1 NG/ML (ref 0–4)
SODIUM BLD-SCNC: 144 MMOL/L (ref 136–145)
TOTAL PROTEIN: 6.9 G/DL (ref 6.4–8.2)
TRIGL SERPL-MCNC: 121 MG/DL (ref 0–150)
VLDLC SERPL CALC-MCNC: 24 MG/DL

## 2020-11-23 RX ORDER — ATORVASTATIN CALCIUM 40 MG/1
TABLET, FILM COATED ORAL
Qty: 90 TABLET | Refills: 3 | Status: SHIPPED | OUTPATIENT
Start: 2020-11-23 | End: 2021-12-07 | Stop reason: SDUPTHER

## 2021-01-20 RX ORDER — LISINOPRIL 40 MG/1
40 TABLET ORAL DAILY
Qty: 60 TABLET | Refills: 5 | Status: SHIPPED | OUTPATIENT
Start: 2021-01-20 | End: 2021-12-07

## 2021-01-22 ENCOUNTER — TELEPHONE (OUTPATIENT)
Dept: CARDIOLOGY CLINIC | Age: 76
End: 2021-01-22

## 2021-01-22 NOTE — TELEPHONE ENCOUNTER
Son made aware we do not have any samples. I gave him an application for patient assistance and instructed for him to return so we can fill out our portion.

## 2021-01-22 NOTE — TELEPHONE ENCOUNTER
Medication Samples    Medication:  apixaban (ELIQUIS)       Dosage of the medication:  5 MG TABS tablet       How are you taking this medication (QD, BID, TID, QID, PRN):  TAKE 1 TABLET BY MOUTH TWO  TIMES DAILY     in the office or Mail to your home?     You can reach Maxx's son Lori Jones at #[phone]

## 2021-01-29 ENCOUNTER — TELEPHONE (OUTPATIENT)
Dept: CARDIOLOGY CLINIC | Age: 76
End: 2021-01-29

## 2021-01-29 NOTE — TELEPHONE ENCOUNTER
Medication Samples     Medication:  apixaban (ELIQUIS)         Dosage of the medication:  5 MG TABS tablet         How are you taking this medication (QD, BID, TID, QID, PRN):  TAKE 1 TABLET BY MOUTH TWO  TIMES DAILY      in the office or Mail to your home?     You can reach Maxx's son Zain Santoyo at #[phone]

## 2021-04-12 ENCOUNTER — NURSE ONLY (OUTPATIENT)
Dept: CARDIOLOGY CLINIC | Age: 76
End: 2021-04-12
Payer: MEDICARE

## 2021-04-12 DIAGNOSIS — Z95.0 PACEMAKER: ICD-10-CM

## 2021-04-12 DIAGNOSIS — R00.1 BRADYCARDIA WITH 31-40 BEATS PER MINUTE: ICD-10-CM

## 2021-04-12 NOTE — LETTER
[address] [phone]  [address] [facility]    Pacemaker/Defibrillator Clinic          04/13/21        [address] [address]        Dear Lynn Sifuentes    This letter is to inform you that we received the transmission from your monitor at home that checks your implanted heart device. The next date your monitor will automatically transmit will be 7-12-21. If your report needs attention we will notify you. Your device and monitor are wireless and most transmit cellularly, but please periodically check your monitor is still plugged in to the electrical outlet. If you still use the telephone land line to send please ensure the connection to the phone luis is secure. This will help to ensure successful automatic transmissions in the future. Also, the monitor needs to be close to you while sleeping at night. Please be aware that the remote device transmission sites are periodically monitored only during regular business hours during which simultaneous in-office device clinics are being run. If your transmission requires attention, we will contact you as soon as possible. Thank you.             [address]

## 2021-04-13 NOTE — PROGRESS NOTES
We received remote transmission from patient's monitor at home. Transmission shows normal sensing and pacing function. EP physician will review. See interrogation under cardiology tab in the [address] [facility] [address] field for more details.

## 2021-04-29 PROCEDURE — 93296 REM INTERROG EVL PM/IDS: CPT | Performed by: INTERNAL MEDICINE

## 2021-04-29 PROCEDURE — 93294 REM INTERROG EVL PM/LDLS PM: CPT | Performed by: INTERNAL MEDICINE

## 2021-06-07 NOTE — TELEPHONE ENCOUNTER
Last OV: 10/12/2020  Next OV: 10/14/2021  Most recent Labs: lipid, cmp 11/18/2020  Last EKG (if needed):10/12/2020

## 2021-07-12 ENCOUNTER — NURSE ONLY (OUTPATIENT)
Dept: CARDIOLOGY CLINIC | Age: 76
End: 2021-07-12

## 2021-07-12 DIAGNOSIS — Z95.0 PACEMAKER: ICD-10-CM

## 2021-07-12 DIAGNOSIS — R00.1 BRADYCARDIA WITH 31-40 BEATS PER MINUTE: ICD-10-CM

## 2021-07-12 PROCEDURE — 93296 REM INTERROG EVL PM/IDS: CPT | Performed by: INTERNAL MEDICINE

## 2021-07-12 PROCEDURE — 93294 REM INTERROG EVL PM/LDLS PM: CPT | Performed by: INTERNAL MEDICINE

## 2021-07-12 NOTE — LETTER
[address] [facility] [phone]  [address] [facility],[address] [phone]    Pacemaker/Defibrillator Clinic        07/14/21        [address] [address]      Dear Thomas Ferrer    This letter is to inform you that we received the transmission from your monitor at home that checks your implanted heart device. The next date you should transmit will be 11/22. If your report requires attention, we will notify you. Please be aware that the remote device transmission sites are periodically monitored only during regular business hours during which simultaneous in-office device clinics are being run. If your transmission requires attention, we will contact you as soon as possible. **PLEASE NOTE** that our [facility] policy and processes are changing to ensure a more seamless approach for all parties involved, allowing more time for our nurses to address patient issues and concerns. We will no longer be sending letters for NORMAL remote transmissions. You will be contacted by phone if your transmission requires attention (as previously done), and letters will only be sent regarding monitor disconnections or missed transmissions if you are unable to be reached by phone. Please do not be alarmed by this new process, as we will continue to contact you when you are due for your transmission AND/OR if your transmission report requires attention. This will be your final NORMAL remote received letter. From this point forward, the [facility] will be utilizing the no news is good news approach. As always, please feel free to contact your nurse with any questions or concerns. Thank you.       Jenaro

## 2021-07-13 NOTE — PROGRESS NOTES
We received remote transmission from patient's Micra pacemaker monitor at home. Transmission shows normal sensing and pacing function. EP physician will review. See interrogation under cardiology tab in the [address] [facility] [address] field for more details.

## 2021-10-13 NOTE — PROGRESS NOTES
[facility]   Office Progress Note             Katia Amaya MD       HPI:  Patient with history of HLD, HTN, CAD s/p stents to the LAD in 2008; New onset AF in 7/27/16 . Later developed symptomatic bradycardia, experienced falls, dizziness, lightheadedness. He underwent PPM implant on 6/20/18 ([facility]). Wears bilateral hearing aids. Comes for follow up and is accompanied by his son. Has no new cardiac complaints. Able to cut grass with no chest pain / pressure. Doing well. Only complaints today is knee pain. Takes all medication as prescribed. Today, specifically denies any chest pain, pressure, tightness, nausea, vomiting, diaphoresis, SOB/BAEZ, palpitations, heart racing, dizziness/lightheadedness, orthopnea, PND, LE edema or syncope. Physical Examination:    /82   Pulse 56   Ht 6' 2\" (1.88 m)   Wt 214 lb (97.1 kg)   SpO2 96%   BMI 27.48 kg/m²    HEENT:  Face: Atraumatic, Conjunctiva: Pink; non icteric,  Mucous Memb:  Moist, No thyromegaly or Lymphadenopathy  Respiratory:  Resp Assessment: normal, Resp Auscultation: clear   Cardiovascular: Auscultation: nl S1 & S2, Palpation:  Nl PMI;  No heaves or thrills, JVP:  normal  Abdomen: Soft, non-tender, Normal bowel sounds,  No organomegaly  Extremities: No Cyanosis or Clubbing; Edema none  Neurological: Oriented to time, place, and person, Non-anxious  Psychiatric: Normal mood and affect  Skin: Warm and dry,  No rash seen    Current Outpatient Medications   Medication Sig Dispense Refill    apixaban (ELIQUIS) 5 MG TABS tablet TAKE 1 TABLET BY MOUTH  TWICE DAILY 180 tablet 3    lisinopril (PRINIVIL;ZESTRIL) 40 MG tablet TAKE 1 TABLET BY MOUTH  DAILY 60 tablet 5    atorvastatin (LIPITOR) 40 MG tablet TAKE 1 TABLET BY MOUTH  NIGHTLY 90 tablet 3    diphenhydrAMINE-Zinc Acetate (BENADRYL ITCH RELIEF EX) Apply topically as needed      fluticasone (FLONASE) 50 MCG/ACT nasal spray 2 sprays by Each Nostril route daily 3 Bottle 1  clobetasol (TEMOVATE) 0.05 % cream Apply topically 2 times daily. 60 g 0    saw palmetto 160 MG capsule Take 160 mg by mouth daily.  nitroGLYCERIN (NITROSTAT) 0.4 MG SL tablet Place 0.4 mg under the tongue every 5 minutes as needed.  Glucosamine-Chondroit-Vit C-Mn (GLUCOSAMINE CHONDROITIN COMPLX) TABS Take 1 tablet by mouth daily. No current facility-administered medications for this visit. ECG:    10/4/19 device interrogated today   10/12/20 ventricular pacemaker   10/14/21 device interrogated today    ECHO 8/2/16  Concentric LVH with normal systolic function. EF is 60% The left atrium is at the upper limits of normal in size. Trivial mitral, aortic and tricuspid regurgitation is present. Normal right ventricular size and function.       Stress MPI: 12/8/2008  Large sized severe apical, anteroseptal and septal completely reversible defect consistent with ischemia in the territory typical of the proximal LAD. Corornary angiogram  & Intervention: 2008  Single vessel CAD involving LAD  Normal LV function  S/p multiple stents in the LAD      Assessment/Plan:      Chronic Atrial Fibrillation  CHads Vasc at least 2 (Age, HTN)  Continue Eliquis 5 mg BID    CAD  S/p stent LAD   No angina  No ASA secondary to Eliquis for Afib     Essential Hypertension  BP is stable  Continue medical management     Hypercholesterolemia  Reviewed lipid panel from 11/18/20  LDL at goal (69)  Continue statin therapy     PPM  Implanted 6/20/18  Medtronic Micro Leadless Pacemaker   Device checked today and functioning properly     Physician Attestation:  The scribes documentation has been prepared under my direction and personally reviewed by me in its entirety. I confirm that the note above accurately reflects all work, treatment, procedures, and medical decision making performed by me. Follow up in 1 year. Thank you very much for allowing me to participate in the care of your patient.  Please do not hesitate to contact me if you have any questions. Sincerely,    Martin Morgan M.D  [address] [facility], [address] [place] [address], [place] [address]  Ph: [phone]  Fax: [phone]    This note was scribed in the presence of Dr. Martin Morgan MD by Cirilo Padgett  Physician Attestation:  The scribes documentation has been prepared under my direction and personally reviewed by me in its entirety. I confirm that the note above accurately reflects all work, treatment, procedures, and medical decision making performed by me.

## 2021-10-14 ENCOUNTER — NURSE ONLY (OUTPATIENT)
Dept: CARDIOLOGY CLINIC | Age: 76
End: 2021-10-14
Payer: MEDICARE

## 2021-10-14 ENCOUNTER — OFFICE VISIT (OUTPATIENT)
Dept: CARDIOLOGY CLINIC | Age: 76
End: 2021-10-14
Payer: MEDICARE

## 2021-10-14 VITALS
BODY MASS INDEX: 27.46 KG/M2 | HEIGHT: 74 IN | SYSTOLIC BLOOD PRESSURE: 138 MMHG | DIASTOLIC BLOOD PRESSURE: 82 MMHG | WEIGHT: 214 LBS | HEART RATE: 56 BPM | OXYGEN SATURATION: 96 %

## 2021-10-14 DIAGNOSIS — Z95.0 PACEMAKER: ICD-10-CM

## 2021-10-14 DIAGNOSIS — E78.00 HYPERCHOLESTEROLEMIA: ICD-10-CM

## 2021-10-14 DIAGNOSIS — I48.20 CHRONIC ATRIAL FIBRILLATION (HCC): Primary | ICD-10-CM

## 2021-10-14 DIAGNOSIS — R00.1 BRADYCARDIA WITH 31-40 BEATS PER MINUTE: ICD-10-CM

## 2021-10-14 DIAGNOSIS — I25.10 CAD IN NATIVE ARTERY: ICD-10-CM

## 2021-10-14 DIAGNOSIS — I10 ESSENTIAL HYPERTENSION: ICD-10-CM

## 2021-10-14 DIAGNOSIS — I48.91 ATRIAL FIBRILLATION WITH SLOW VENTRICULAR RESPONSE (HCC): ICD-10-CM

## 2021-10-14 PROCEDURE — G8417 CALC BMI ABV UP PARAM F/U: HCPCS | Performed by: INTERNAL MEDICINE

## 2021-10-14 PROCEDURE — 93279 PRGRMG DEV EVAL PM/LDLS PM: CPT | Performed by: INTERNAL MEDICINE

## 2021-10-14 PROCEDURE — 99214 OFFICE O/P EST MOD 30 MIN: CPT | Performed by: INTERNAL MEDICINE

## 2021-10-14 PROCEDURE — 1036F TOBACCO NON-USER: CPT | Performed by: INTERNAL MEDICINE

## 2021-10-14 PROCEDURE — 4040F PNEUMOC VAC/ADMIN/RCVD: CPT | Performed by: INTERNAL MEDICINE

## 2021-10-14 PROCEDURE — G8427 DOCREV CUR MEDS BY ELIG CLIN: HCPCS | Performed by: INTERNAL MEDICINE

## 2021-10-14 PROCEDURE — 1123F ACP DISCUSS/DSCN MKR DOCD: CPT | Performed by: INTERNAL MEDICINE

## 2021-10-14 PROCEDURE — G8484 FLU IMMUNIZE NO ADMIN: HCPCS | Performed by: INTERNAL MEDICINE

## 2021-10-14 NOTE — PROGRESS NOTES
Pt seen in clinic today for annual cardiac device interrogation. Their device is a MDT single chamber ppm  Based on threshold, impedance, and intrinsic sensing tests run today, the device appears to be functioning normally. Remaining battery life is 4y9m                  97.16%      Rx:   apixaban (ELIQUIS) 5 MG TABS tablet TAKE 1 TABLET BY MOUTH TWO TIMES DAILY     Pt was informed of findings today and general questions have been answered with regard to device. Home monitoring hardware is transmitting on schedule. Pt to see Dr. Krista Hammond in clinic today following their device check.

## 2021-11-22 ENCOUNTER — NURSE ONLY (OUTPATIENT)
Dept: CARDIOLOGY CLINIC | Age: 76
End: 2021-11-22
Payer: MEDICARE

## 2021-11-22 DIAGNOSIS — I48.91 ATRIAL FIBRILLATION WITH SLOW VENTRICULAR RESPONSE (HCC): ICD-10-CM

## 2021-11-22 DIAGNOSIS — R00.1 BRADYCARDIA WITH 31-40 BEATS PER MINUTE: ICD-10-CM

## 2021-11-22 DIAGNOSIS — Z95.0 PACEMAKER: ICD-10-CM

## 2021-11-23 PROCEDURE — 93296 REM INTERROG EVL PM/IDS: CPT | Performed by: INTERNAL MEDICINE

## 2021-11-23 PROCEDURE — 93294 REM INTERROG EVL PM/LDLS PM: CPT | Performed by: INTERNAL MEDICINE

## 2022-01-05 ENCOUNTER — IMMUNIZATION (OUTPATIENT)
Dept: FAMILY MEDICINE CLINIC | Age: 77
End: 2022-01-05
Payer: MEDICARE

## 2022-01-05 DIAGNOSIS — Z23 FLU VACCINE NEED: Primary | ICD-10-CM

## 2022-01-05 PROCEDURE — G0008 ADMIN INFLUENZA VIRUS VAC: HCPCS | Performed by: INTERNAL MEDICINE

## 2022-01-05 PROCEDURE — 90674 CCIIV4 VAC NO PRSV 0.5 ML IM: CPT | Performed by: INTERNAL MEDICINE

## 2022-02-01 DIAGNOSIS — I10 BENIGN HYPERTENSION: ICD-10-CM

## 2022-02-01 DIAGNOSIS — E78.00 HYPERCHOLESTEROLEMIA: ICD-10-CM

## 2022-02-01 LAB
A/G RATIO: 1.8 (ref 1.1–2.2)
ALBUMIN SERPL-MCNC: 4.5 G/DL (ref 3.4–5)
ALP BLD-CCNC: 106 U/L (ref 40–129)
ALT SERPL-CCNC: 18 U/L (ref 10–40)
ANION GAP SERPL CALCULATED.3IONS-SCNC: 16 MMOL/L (ref 3–16)
AST SERPL-CCNC: 25 U/L (ref 15–37)
BILIRUB SERPL-MCNC: 1.6 MG/DL (ref 0–1)
BUN BLDV-MCNC: 11 MG/DL (ref 7–20)
CALCIUM SERPL-MCNC: 10 MG/DL (ref 8.3–10.6)
CHLORIDE BLD-SCNC: 102 MMOL/L (ref 99–110)
CHOLESTEROL, FASTING: 145 MG/DL (ref 0–199)
CO2: 24 MMOL/L (ref 21–32)
CREAT SERPL-MCNC: 1.3 MG/DL (ref 0.8–1.3)
GFR AFRICAN AMERICAN: >60
GFR NON-AFRICAN AMERICAN: 53
GLUCOSE BLD-MCNC: 103 MG/DL (ref 70–99)
HCT VFR BLD CALC: 41.9 % (ref 40.5–52.5)
HDLC SERPL-MCNC: 49 MG/DL (ref 40–60)
HEMOGLOBIN: 14.2 G/DL (ref 13.5–17.5)
LDL CHOLESTEROL CALCULATED: 72 MG/DL
MCH RBC QN AUTO: 32.1 PG (ref 26–34)
MCHC RBC AUTO-ENTMCNC: 33.7 G/DL (ref 31–36)
MCV RBC AUTO: 95 FL (ref 80–100)
PDW BLD-RTO: 13.9 % (ref 12.4–15.4)
PLATELET # BLD: 133 K/UL (ref 135–450)
PMV BLD AUTO: 9.5 FL (ref 5–10.5)
POTASSIUM SERPL-SCNC: 3.9 MMOL/L (ref 3.5–5.1)
RBC # BLD: 4.41 M/UL (ref 4.2–5.9)
SODIUM BLD-SCNC: 142 MMOL/L (ref 136–145)
TOTAL PROTEIN: 7 G/DL (ref 6.4–8.2)
TRIGLYCERIDE, FASTING: 120 MG/DL (ref 0–150)
VLDLC SERPL CALC-MCNC: 24 MG/DL
WBC # BLD: 4.8 K/UL (ref 4–11)

## 2022-02-09 ENCOUNTER — OFFICE VISIT (OUTPATIENT)
Dept: FAMILY MEDICINE CLINIC | Age: 77
End: 2022-02-09
Payer: MEDICARE

## 2022-02-09 VITALS
HEIGHT: 74 IN | DIASTOLIC BLOOD PRESSURE: 80 MMHG | OXYGEN SATURATION: 96 % | WEIGHT: 216 LBS | TEMPERATURE: 97.4 F | BODY MASS INDEX: 27.72 KG/M2 | SYSTOLIC BLOOD PRESSURE: 160 MMHG | HEART RATE: 86 BPM

## 2022-02-09 DIAGNOSIS — I48.20 CHRONIC ATRIAL FIBRILLATION (HCC): ICD-10-CM

## 2022-02-09 DIAGNOSIS — I10 BENIGN HYPERTENSION: Primary | ICD-10-CM

## 2022-02-09 DIAGNOSIS — E78.00 HYPERCHOLESTEROLEMIA: ICD-10-CM

## 2022-02-09 DIAGNOSIS — I49.5 SSS (SICK SINUS SYNDROME) (HCC): ICD-10-CM

## 2022-02-09 PROCEDURE — G8417 CALC BMI ABV UP PARAM F/U: HCPCS | Performed by: INTERNAL MEDICINE

## 2022-02-09 PROCEDURE — 1123F ACP DISCUSS/DSCN MKR DOCD: CPT | Performed by: INTERNAL MEDICINE

## 2022-02-09 PROCEDURE — 99214 OFFICE O/P EST MOD 30 MIN: CPT | Performed by: INTERNAL MEDICINE

## 2022-02-09 PROCEDURE — 4040F PNEUMOC VAC/ADMIN/RCVD: CPT | Performed by: INTERNAL MEDICINE

## 2022-02-09 PROCEDURE — 1036F TOBACCO NON-USER: CPT | Performed by: INTERNAL MEDICINE

## 2022-02-09 PROCEDURE — G8427 DOCREV CUR MEDS BY ELIG CLIN: HCPCS | Performed by: INTERNAL MEDICINE

## 2022-02-09 PROCEDURE — G8482 FLU IMMUNIZE ORDER/ADMIN: HCPCS | Performed by: INTERNAL MEDICINE

## 2022-02-09 RX ORDER — AMLODIPINE BESYLATE 2.5 MG/1
2.5 TABLET ORAL DAILY
Qty: 30 TABLET | Refills: 5 | Status: SHIPPED | OUTPATIENT
Start: 2022-02-09 | End: 2022-08-10

## 2022-02-09 SDOH — ECONOMIC STABILITY: FOOD INSECURITY: WITHIN THE PAST 12 MONTHS, THE FOOD YOU BOUGHT JUST DIDN'T LAST AND YOU DIDN'T HAVE MONEY TO GET MORE.: NEVER TRUE

## 2022-02-09 SDOH — ECONOMIC STABILITY: TRANSPORTATION INSECURITY
IN THE PAST 12 MONTHS, HAS THE LACK OF TRANSPORTATION KEPT YOU FROM MEDICAL APPOINTMENTS OR FROM GETTING MEDICATIONS?: NO

## 2022-02-09 SDOH — ECONOMIC STABILITY: FOOD INSECURITY: WITHIN THE PAST 12 MONTHS, YOU WORRIED THAT YOUR FOOD WOULD RUN OUT BEFORE YOU GOT MONEY TO BUY MORE.: NEVER TRUE

## 2022-02-09 SDOH — ECONOMIC STABILITY: TRANSPORTATION INSECURITY
IN THE PAST 12 MONTHS, HAS LACK OF TRANSPORTATION KEPT YOU FROM MEETINGS, WORK, OR FROM GETTING THINGS NEEDED FOR DAILY LIVING?: NO

## 2022-02-09 ASSESSMENT — PATIENT HEALTH QUESTIONNAIRE - PHQ9
SUM OF ALL RESPONSES TO PHQ9 QUESTIONS 1 & 2: 0
1. LITTLE INTEREST OR PLEASURE IN DOING THINGS: 0
SUM OF ALL RESPONSES TO PHQ QUESTIONS 1-9: 0
2. FEELING DOWN, DEPRESSED OR HOPELESS: 0
SUM OF ALL RESPONSES TO PHQ QUESTIONS 1-9: 0

## 2022-02-09 ASSESSMENT — SOCIAL DETERMINANTS OF HEALTH (SDOH): HOW HARD IS IT FOR YOU TO PAY FOR THE VERY BASICS LIKE FOOD, HOUSING, MEDICAL CARE, AND HEATING?: NOT HARD AT ALL

## 2022-02-09 NOTE — PROGRESS NOTES
SUBJECTIVE:  Neda Lehman is a 67 y.o. male being evaluated for:    Chief Complaint   Patient presents with    Follow-up      Patient is here for [facility] followup today. HPI   Doing very well      Allergies   Allergen Reactions    Bee Venom Anaphylaxis     Current Outpatient Medications   Medication Sig Dispense Refill    amLODIPine (NORVASC) 2.5 MG tablet Take 1 tablet by mouth daily 30 tablet 5    lisinopril (PRINIVIL;ZESTRIL) 40 MG tablet TAKE 1 TABLET BY MOUTH  DAILY 90 tablet 0    atorvastatin (LIPITOR) 40 MG tablet TAKE 1 TABLET BY MOUTH  NIGHTLY 90 tablet 0    apixaban (ELIQUIS) 5 MG TABS tablet TAKE 1 TABLET BY MOUTH  TWICE DAILY 180 tablet 3    diphenhydrAMINE-Zinc Acetate (BENADRYL ITCH RELIEF EX) Apply topically as needed      fluticasone (FLONASE) 50 MCG/ACT nasal spray 2 sprays by Each Nostril route daily 3 Bottle 1    clobetasol (TEMOVATE) 0.05 % cream Apply topically 2 times daily. 60 g 0    saw palmetto 160 MG capsule Take 160 mg by mouth daily.  nitroGLYCERIN (NITROSTAT) 0.4 MG SL tablet Place 0.4 mg under the tongue every 5 minutes as needed.  Glucosamine-Chondroit-Vit C-Mn (GLUCOSAMINE CHONDROITIN COMPLX) TABS Take 1 tablet by mouth daily. No current facility-administered medications for this visit.          Social History     Socioeconomic History    Marital status:      Spouse name: Not on file    Number of children: Not on file    Years of education: Not on file    Highest education level: Not on file   Occupational History    Not on file   Tobacco Use    Smoking status: Former Smoker     Packs/day: 0.50     Years: 7.00     Pack years: 3.50     Types: Cigarettes     Quit date: 1970     Years since quittin.1    Smokeless tobacco: Never Used   Vaping Use    Vaping Use: Never used   Substance and Sexual Activity    Alcohol use: No    Drug use: No    Sexual activity: Yes     Partners: Female   Other Topics Concern    Not on file Social History Narrative    Not on file     Social Determinants of Health     Financial Resource Strain: Low Risk     Difficulty of Paying Living Expenses: Not hard at all   Food Insecurity: No Food Insecurity    Worried About Running Out of Food in the Last Year: Never true    [address] in the Last Year: Never true   Transportation Needs: No Transportation Needs    Lack of Transportation (Medical): No    Lack of Transportation (Non-Medical):  No   Physical Activity:     Days of Exercise per Week: Not on file    Minutes of Exercise per Session: Not on file   Stress:     Feeling of Stress : Not on file   Social Connections:     Frequency of Communication with Friends and Family: Not on file    Frequency of Social Gatherings with Friends and Family: Not on file    Attends Gnosticism Services: Not on file    Active Member of [address] [place] [address] or Organizations: Not on file    Attends Club or Organization Meetings: Not on file    Marital Status: Not on file   Intimate Partner Violence:     Fear of Current or Ex-Partner: Not on file    Emotionally Abused: Not on file    Physically Abused: Not on file    Sexually Abused: Not on file   Housing Stability:     Unable to Pay for Housing in the Last Year: Not on file    Number of [place] in the Last Year: Not on file    Unstable Housing in the Last Year: Not on file      Past Medical History:   Diagnosis Date    Atrial fibrillation ([facility] [address]     Basal cell carcinoma     right ear and right cheek    Bilateral tinnitus 2005    Bradycardia     required pacemaker     CAD (coronary artery disease)     4 stents placed     GERD (gastroesophageal reflux disease)     Hyperlipidemia     Hypertension     Osteoarthritis      Past Surgical History:   Procedure Laterality Date    A-V CARDIAC PACEMAKER INSERTION      bradycardia     CORONARY ANGIOPLASTY WITH STENT PLACEMENT  2008    EXTERNAL EAR SURGERY Right     removed basal cell cancer    SKIN CANCER DESTRUCTION  03/2017    Right cheek       Review of Systems   Constitutional: Negative for activity change, fatigue and unexpected weight change. HENT: Negative for nosebleeds, trouble swallowing and voice change. Respiratory: Negative for shortness of breath. Cardiovascular: Negative for chest pain, palpitations and leg swelling. Gastrointestinal: Negative for abdominal pain, diarrhea, nausea and vomiting. Musculoskeletal: Negative for back pain and gait problem. Neurological: Negative for dizziness, light-headedness and headaches. OBJECTIVE:  BP (!) 160/80   Pulse 86   Temp 97.4 °F (36.3 °C) (Oral)   Ht 6' 2\" (1.88 m)   Wt 216 lb (98 kg)   SpO2 96%   BMI 27.73 kg/m²      Body mass index is 27.73 kg/m². Physical Exam  Vitals and nursing note reviewed. Constitutional:       General: He is not in acute distress. Appearance: Normal appearance. He is well-developed. HENT:      Head: Normocephalic and atraumatic. Eyes:      Conjunctiva/sclera: Conjunctivae normal.   Neck:      Thyroid: No thyromegaly. Vascular: No carotid bruit or JVD. Cardiovascular:      Rate and Rhythm: Normal rate and regular rhythm. Heart sounds: Normal heart sounds. No murmur heard. No friction rub. No gallop. Pulmonary:      Effort: Pulmonary effort is normal.      Breath sounds: Normal breath sounds. Chest:      Chest wall: No tenderness. Abdominal:      General: There is no distension. Palpations: Abdomen is soft. Tenderness: There is no abdominal tenderness. Comments: No HSM   Musculoskeletal:         General: No swelling. Cervical back: Neck supple. Lymphadenopathy:      Cervical: No cervical adenopathy. Skin:     General: Skin is warm and dry. Neurological:      General: No focal deficit present. Mental Status: He is alert.       Gait: Gait normal.   Psychiatric:         Mood and Affect: Mood normal.         Behavior: Behavior normal.         Thought Content: Thought content normal.         ASSESSMENT/PLAN:    Greer Kay was seen today for follow-up. Diagnoses and all orders for this visit:    Benign hypertension up add medication  -     amLODIPine (NORVASC) 2.5 MG tablet; Take 1 tablet by mouth daily    Chronic atrial fibrillation (HCC) on eliquis      SSS (sick sinus syndrome) ([facility] [address]  Pacer      coronary artery disease no angina  follows with Dr Danya Claudio     Hypercholesterolemia chol profile ok with ldl 72        Orders Placed This Encounter   Medications    amLODIPine (NORVASC) 2.5 MG tablet     Sig: Take 1 tablet by mouth daily     Dispense:  30 tablet     Refill:  5        Return in about 3 months (around 5/9/2022), or if symptoms worsen or fail to improve, for wellness medicare and BP check. There are no Patient Instructions on file for this visit.

## 2022-02-20 ASSESSMENT — ENCOUNTER SYMPTOMS
SHORTNESS OF BREATH: 0
VOMITING: 0
TROUBLE SWALLOWING: 0
VOICE CHANGE: 0
BACK PAIN: 0
DIARRHEA: 0
ABDOMINAL PAIN: 0
NAUSEA: 0

## 2022-02-21 ENCOUNTER — NURSE ONLY (OUTPATIENT)
Dept: CARDIOLOGY CLINIC | Age: 77
End: 2022-02-21
Payer: MEDICARE

## 2022-02-21 DIAGNOSIS — Z95.0 PACEMAKER: ICD-10-CM

## 2022-02-21 DIAGNOSIS — R00.1 BRADYCARDIA WITH 31-40 BEATS PER MINUTE: ICD-10-CM

## 2022-02-21 PROCEDURE — 93294 REM INTERROG EVL PM/LDLS PM: CPT | Performed by: INTERNAL MEDICINE

## 2022-02-21 PROCEDURE — 93296 REM INTERROG EVL PM/IDS: CPT | Performed by: INTERNAL MEDICINE

## 2022-02-22 NOTE — PROGRESS NOTES
We received remote transmission from patient's Micra pacemaker monitor at home (Eliqu[employer]). Transmission shows normal sensing and pacing function. Slight increasing Capture Threshold trend noted. No observations based on current interrogation. EP physician will review. See interrogation under cardiology tab in the [address] [place] [address] [address] field for more details.

## 2022-03-10 RX ORDER — LISINOPRIL 40 MG/1
40 TABLET ORAL DAILY
Qty: 90 TABLET | Refills: 3 | Status: SHIPPED | OUTPATIENT
Start: 2022-03-10

## 2022-03-10 RX ORDER — ATORVASTATIN CALCIUM 40 MG/1
TABLET, FILM COATED ORAL
Qty: 90 TABLET | Refills: 3 | Status: SHIPPED | OUTPATIENT
Start: 2022-03-10

## 2022-05-25 ENCOUNTER — HOSPITAL ENCOUNTER (INPATIENT)
Age: 77
LOS: 1 days | Discharge: HOME | DRG: 149 | End: 2022-05-27
Attending: EMERGENCY MEDICINE | Admitting: INTERNAL MEDICINE
Payer: MEDICARE

## 2022-05-25 ENCOUNTER — APPOINTMENT (OUTPATIENT)
Dept: GENERAL RADIOLOGY | Age: 77
DRG: 149 | End: 2022-05-25
Payer: MEDICARE

## 2022-05-25 ENCOUNTER — APPOINTMENT (OUTPATIENT)
Dept: CT IMAGING | Age: 77
DRG: 149 | End: 2022-05-25
Payer: MEDICARE

## 2022-05-25 ENCOUNTER — NURSE ONLY (OUTPATIENT)
Dept: CARDIOLOGY CLINIC | Age: 77
End: 2022-05-25
Payer: MEDICARE

## 2022-05-25 DIAGNOSIS — I49.5 SA NODE DYSFUNCTION (HCC): ICD-10-CM

## 2022-05-25 DIAGNOSIS — Z95.0 PACEMAKER: ICD-10-CM

## 2022-05-25 DIAGNOSIS — H81.393 PERIPHERAL VERTIGO OF BOTH EARS: ICD-10-CM

## 2022-05-25 DIAGNOSIS — H53.461 HEMIANOPIA OF RIGHT EYE: ICD-10-CM

## 2022-05-25 DIAGNOSIS — R42 VERTIGO: Primary | ICD-10-CM

## 2022-05-25 LAB
BASOPHILS ABSOLUTE: 0 K/UL (ref 0–0.2)
BASOPHILS RELATIVE PERCENT: 0.6 %
EOSINOPHILS ABSOLUTE: 0 K/UL (ref 0–0.6)
EOSINOPHILS RELATIVE PERCENT: 0.8 %
HCT VFR BLD CALC: 39.6 % (ref 40.5–52.5)
HEMOGLOBIN: 13.7 G/DL (ref 13.5–17.5)
LYMPHOCYTES ABSOLUTE: 0.9 K/UL (ref 1–5.1)
LYMPHOCYTES RELATIVE PERCENT: 18.1 %
MCH RBC QN AUTO: 31.9 PG (ref 26–34)
MCHC RBC AUTO-ENTMCNC: 34.5 G/DL (ref 31–36)
MCV RBC AUTO: 92.6 FL (ref 80–100)
MONOCYTES ABSOLUTE: 0.3 K/UL (ref 0–1.3)
MONOCYTES RELATIVE PERCENT: 6.5 %
NEUTROPHILS ABSOLUTE: 3.5 K/UL (ref 1.7–7.7)
NEUTROPHILS RELATIVE PERCENT: 74 %
PDW BLD-RTO: 13.8 % (ref 12.4–15.4)
PLATELET # BLD: 137 K/UL (ref 135–450)
PMV BLD AUTO: 9.1 FL (ref 5–10.5)
RBC # BLD: 4.28 M/UL (ref 4.2–5.9)
TROPONIN: <0.01 NG/ML
WBC # BLD: 4.7 K/UL (ref 4–11)

## 2022-05-25 PROCEDURE — 99285 EMERGENCY DEPT VISIT HI MDM: CPT

## 2022-05-25 PROCEDURE — 70450 CT HEAD/BRAIN W/O DYE: CPT

## 2022-05-25 PROCEDURE — 70498 CT ANGIOGRAPHY NECK: CPT

## 2022-05-25 PROCEDURE — 84484 ASSAY OF TROPONIN QUANT: CPT

## 2022-05-25 PROCEDURE — 4A03X5D MEASUREMENT OF ARTERIAL FLOW, INTRACRANIAL, EXTERNAL APPROACH: ICD-10-PCS | Performed by: RADIOLOGY

## 2022-05-25 PROCEDURE — 36415 COLL VENOUS BLD VENIPUNCTURE: CPT

## 2022-05-25 PROCEDURE — 6360000004 HC RX CONTRAST MEDICATION: Performed by: EMERGENCY MEDICINE

## 2022-05-25 PROCEDURE — 93005 ELECTROCARDIOGRAM TRACING: CPT | Performed by: EMERGENCY MEDICINE

## 2022-05-25 PROCEDURE — 85025 COMPLETE CBC W/AUTO DIFF WBC: CPT

## 2022-05-25 PROCEDURE — 71045 X-RAY EXAM CHEST 1 VIEW: CPT

## 2022-05-25 PROCEDURE — 80053 COMPREHEN METABOLIC PANEL: CPT

## 2022-05-25 RX ADMIN — IOPAMIDOL 75 ML: 755 INJECTION, SOLUTION INTRAVENOUS at 23:10

## 2022-05-26 ENCOUNTER — TELEPHONE (OUTPATIENT)
Dept: CARDIOLOGY CLINIC | Age: 77
End: 2022-05-26

## 2022-05-26 PROBLEM — H81.399 PERIPHERAL VERTIGO: Status: ACTIVE | Noted: 2022-05-26

## 2022-05-26 PROBLEM — R42 DIZZINESS: Status: ACTIVE | Noted: 2022-05-26

## 2022-05-26 LAB
A/G RATIO: 1.5 (ref 1.1–2.2)
ALBUMIN SERPL-MCNC: 4.2 G/DL (ref 3.4–5)
ALP BLD-CCNC: 94 U/L (ref 40–129)
ALT SERPL-CCNC: 20 U/L (ref 10–40)
ANION GAP SERPL CALCULATED.3IONS-SCNC: 14 MMOL/L (ref 3–16)
ANION GAP SERPL CALCULATED.3IONS-SCNC: 17 MMOL/L (ref 3–16)
AST SERPL-CCNC: 30 U/L (ref 15–37)
BILIRUB SERPL-MCNC: 1.5 MG/DL (ref 0–1)
BILIRUBIN URINE: NEGATIVE
BLOOD, URINE: NEGATIVE
BUN BLDV-MCNC: 11 MG/DL (ref 7–20)
BUN BLDV-MCNC: 12 MG/DL (ref 7–20)
CALCIUM SERPL-MCNC: 9.5 MG/DL (ref 8.3–10.6)
CALCIUM SERPL-MCNC: 9.6 MG/DL (ref 8.3–10.6)
CHLORIDE BLD-SCNC: 102 MMOL/L (ref 99–110)
CHLORIDE BLD-SCNC: 105 MMOL/L (ref 99–110)
CLARITY: CLEAR
CO2: 21 MMOL/L (ref 21–32)
CO2: 22 MMOL/L (ref 21–32)
COLOR: YELLOW
CREAT SERPL-MCNC: 1.1 MG/DL (ref 0.8–1.3)
CREAT SERPL-MCNC: 1.1 MG/DL (ref 0.8–1.3)
GFR AFRICAN AMERICAN: >60
GFR AFRICAN AMERICAN: >60
GFR NON-AFRICAN AMERICAN: >60
GFR NON-AFRICAN AMERICAN: >60
GLUCOSE BLD-MCNC: 119 MG/DL (ref 70–99)
GLUCOSE BLD-MCNC: 125 MG/DL (ref 70–99)
GLUCOSE URINE: NEGATIVE MG/DL
INR BLD: 1.2 (ref 0.87–1.14)
KETONES, URINE: ABNORMAL MG/DL
LEUKOCYTE ESTERASE, URINE: NEGATIVE
MICROSCOPIC EXAMINATION: ABNORMAL
NITRITE, URINE: NEGATIVE
PH UA: 7.5 (ref 5–8)
POTASSIUM REFLEX MAGNESIUM: 3.7 MMOL/L (ref 3.5–5.1)
POTASSIUM REFLEX MAGNESIUM: 3.9 MMOL/L (ref 3.5–5.1)
PROTEIN UA: NEGATIVE MG/DL
PROTHROMBIN TIME: 15.1 SEC (ref 11.7–14.5)
SODIUM BLD-SCNC: 140 MMOL/L (ref 136–145)
SODIUM BLD-SCNC: 141 MMOL/L (ref 136–145)
SPECIFIC GRAVITY UA: 1.01 (ref 1–1.03)
TOTAL PROTEIN: 7 G/DL (ref 6.4–8.2)
URINE REFLEX TO CULTURE: ABNORMAL
URINE TYPE: ABNORMAL
UROBILINOGEN, URINE: 0.2 E.U./DL

## 2022-05-26 PROCEDURE — 6370000000 HC RX 637 (ALT 250 FOR IP): Performed by: NURSE PRACTITIONER

## 2022-05-26 PROCEDURE — 92523 SPEECH SOUND LANG COMPREHEN: CPT

## 2022-05-26 PROCEDURE — 80048 BASIC METABOLIC PNL TOTAL CA: CPT

## 2022-05-26 PROCEDURE — 93296 REM INTERROG EVL PM/IDS: CPT | Performed by: INTERNAL MEDICINE

## 2022-05-26 PROCEDURE — 97530 THERAPEUTIC ACTIVITIES: CPT

## 2022-05-26 PROCEDURE — 93294 REM INTERROG EVL PM/LDLS PM: CPT | Performed by: INTERNAL MEDICINE

## 2022-05-26 PROCEDURE — 92610 EVALUATE SWALLOWING FUNCTION: CPT

## 2022-05-26 PROCEDURE — 97161 PT EVAL LOW COMPLEX 20 MIN: CPT | Performed by: PHYSICAL THERAPIST

## 2022-05-26 PROCEDURE — 97165 OT EVAL LOW COMPLEX 30 MIN: CPT

## 2022-05-26 PROCEDURE — 36415 COLL VENOUS BLD VENIPUNCTURE: CPT

## 2022-05-26 PROCEDURE — G0378 HOSPITAL OBSERVATION PER HR: HCPCS

## 2022-05-26 PROCEDURE — 85610 PROTHROMBIN TIME: CPT

## 2022-05-26 PROCEDURE — 97116 GAIT TRAINING THERAPY: CPT | Performed by: PHYSICAL THERAPIST

## 2022-05-26 PROCEDURE — 81003 URINALYSIS AUTO W/O SCOPE: CPT

## 2022-05-26 PROCEDURE — 97530 THERAPEUTIC ACTIVITIES: CPT | Performed by: PHYSICAL THERAPIST

## 2022-05-26 RX ORDER — AMLODIPINE BESYLATE 5 MG/1
2.5 TABLET ORAL DAILY
Status: DISCONTINUED | OUTPATIENT
Start: 2022-05-26 | End: 2022-05-27 | Stop reason: HOSPADM

## 2022-05-26 RX ORDER — FLUTICASONE PROPIONATE 50 MCG
2 SPRAY, SUSPENSION (ML) NASAL DAILY
Status: DISCONTINUED | OUTPATIENT
Start: 2022-05-26 | End: 2022-05-27 | Stop reason: HOSPADM

## 2022-05-26 RX ORDER — ASPIRIN 81 MG/1
81 TABLET ORAL DAILY
Status: DISCONTINUED | OUTPATIENT
Start: 2022-05-26 | End: 2022-05-27 | Stop reason: HOSPADM

## 2022-05-26 RX ORDER — ONDANSETRON 2 MG/ML
4 INJECTION INTRAMUSCULAR; INTRAVENOUS EVERY 6 HOURS PRN
Status: DISCONTINUED | OUTPATIENT
Start: 2022-05-26 | End: 2022-05-27 | Stop reason: HOSPADM

## 2022-05-26 RX ORDER — LANOLIN ALCOHOL/MO/W.PET/CERES
3 CREAM (GRAM) TOPICAL NIGHTLY PRN
Status: DISCONTINUED | OUTPATIENT
Start: 2022-05-26 | End: 2022-05-27 | Stop reason: HOSPADM

## 2022-05-26 RX ORDER — ATORVASTATIN CALCIUM 40 MG/1
40 TABLET, FILM COATED ORAL NIGHTLY
Status: DISCONTINUED | OUTPATIENT
Start: 2022-05-26 | End: 2022-05-27 | Stop reason: HOSPADM

## 2022-05-26 RX ORDER — ACETAMINOPHEN 325 MG/1
650 TABLET ORAL EVERY 4 HOURS PRN
Status: DISCONTINUED | OUTPATIENT
Start: 2022-05-26 | End: 2022-05-27 | Stop reason: HOSPADM

## 2022-05-26 RX ORDER — LABETALOL HYDROCHLORIDE 5 MG/ML
10 INJECTION, SOLUTION INTRAVENOUS EVERY 10 MIN PRN
Status: DISCONTINUED | OUTPATIENT
Start: 2022-05-26 | End: 2022-05-27 | Stop reason: HOSPADM

## 2022-05-26 RX ORDER — CLOBETASOL PROPIONATE 0.5 MG/G
CREAM TOPICAL 2 TIMES DAILY
Status: DISCONTINUED | OUTPATIENT
Start: 2022-05-26 | End: 2022-05-26

## 2022-05-26 RX ORDER — ONDANSETRON 4 MG/1
4 TABLET, ORALLY DISINTEGRATING ORAL EVERY 8 HOURS PRN
Status: DISCONTINUED | OUTPATIENT
Start: 2022-05-26 | End: 2022-05-27 | Stop reason: HOSPADM

## 2022-05-26 RX ORDER — LISINOPRIL 40 MG/1
40 TABLET ORAL DAILY
Status: DISCONTINUED | OUTPATIENT
Start: 2022-05-26 | End: 2022-05-27 | Stop reason: HOSPADM

## 2022-05-26 RX ORDER — ASPIRIN 300 MG/1
300 SUPPOSITORY RECTAL DAILY
Status: DISCONTINUED | OUTPATIENT
Start: 2022-05-26 | End: 2022-05-27 | Stop reason: HOSPADM

## 2022-05-26 RX ORDER — POLYETHYLENE GLYCOL 3350 17 G/17G
17 POWDER, FOR SOLUTION ORAL DAILY PRN
Status: DISCONTINUED | OUTPATIENT
Start: 2022-05-26 | End: 2022-05-27 | Stop reason: HOSPADM

## 2022-05-26 RX ADMIN — Medication 3 MG: at 02:28

## 2022-05-26 RX ADMIN — APIXABAN 5 MG: 5 TABLET, FILM COATED ORAL at 09:17

## 2022-05-26 RX ADMIN — AMLODIPINE BESYLATE 2.5 MG: 5 TABLET ORAL at 09:17

## 2022-05-26 RX ADMIN — ASPIRIN 81 MG: 81 TABLET, COATED ORAL at 09:17

## 2022-05-26 RX ADMIN — APIXABAN 5 MG: 5 TABLET, FILM COATED ORAL at 21:12

## 2022-05-26 RX ADMIN — LISINOPRIL 40 MG: 40 TABLET ORAL at 09:17

## 2022-05-26 RX ADMIN — ACETAMINOPHEN 650 MG: 325 TABLET ORAL at 02:28

## 2022-05-26 RX ADMIN — FLUTICASONE PROPIONATE 2 SPRAY: 50 SPRAY, METERED NASAL at 09:18

## 2022-05-26 RX ADMIN — ATORVASTATIN CALCIUM 40 MG: 40 TABLET, FILM COATED ORAL at 21:12

## 2022-05-26 ASSESSMENT — PAIN SCALES - GENERAL
PAINLEVEL_OUTOF10: 0
PAINLEVEL_OUTOF10: 7

## 2022-05-26 ASSESSMENT — LIFESTYLE VARIABLES
HOW OFTEN DO YOU HAVE A DRINK CONTAINING ALCOHOL: NEVER
HOW MANY STANDARD DRINKS CONTAINING ALCOHOL DO YOU HAVE ON A TYPICAL DAY: 1 OR 2

## 2022-05-26 NOTE — PROGRESS NOTES
NAME:  Moise Lockhart OF BIRTH:  1945  MEDICAL RECORD NUMBER:  [id number]  TODAYS DATE:  5/26/2022    Discussed personal risk factors for Stroke /TIA with patient/family, and ways to reduce the risk for a recurrent stroke. Patient's personal risk factors which were identified are:     [] Alcohol Abuse: check with your physician before any alcohol consumption. [] Atrial fibrillation: may cause blood clots. [] Drug Abuse: Seek help, talk with your doctor  [] Clotting Disorder  [] Diabetes  [x] Family history of stroke or heart disease  [x] High Blood Pressure/Hypertension: work with your physician. [x] High cholesterol: monitor cholesterol levels with your physician. [x] Overweight/Obesity: work with your physician for your ideal body weight. [x] Physical Inactivity: get regular exercise as directed by your physician. [] Personal history of previous TIA or stroke  [] Poor Diet; decrease salt (sodium) in your diet, follow diet directed by physician. [x] Smoking: Cigarette/Cigar: stop smoking. Advised pt. that you can reduce your risk for stroke/TIA by modifying/controlling the risk factors that you have. Pt.advised to take the medications as prescribed, which will be detailed in the discharge instructions, and to not stop taking them without consulting their physician. In addition, pt. advised to maintain a healthy diet, exercise regularly and to not smoke. [facility]'s Stroke treatment and prevention, Managing your recovery  notebook  provided and/or reviewed  with patient/family. The notebook includes, but not limited to, sections addressing warning signs & symptoms of a stroke, which are: sudden numbness or weakness especially on one side of the body, sudden confusion, difficulty speaking or understanding, sudden changes in vision, sudden dizziness or loss of balance/ coordination, sudden severe headache, syncope and seizure.   The need to call EMS (911) immediately if signs & symptoms occur is emphasized . The notebook also provides education on Stroke community resources and stroke advocacy. The need for follow-up after discharge was highlighted with patient/family with them being able to repeat understanding of the importance of this.       Electronically signed by Fany Rider RN on 5/26/2022 at 4:20 AM

## 2022-05-26 NOTE — ED PROVIDER NOTES
[address] [facility]      Pt Name: Jessica Wang  MRN: [id number]  [place] 1945  Date of evaluation: 5/25/2022  Provider: Elaine Lundborg, [address]  Chief Complaint   Patient presents with    Dizziness       I wore personal protective equipment when I was in the room the entire time. This includes gloves, N95 mask, face shield, and a glove over my stethoscope for protection. HPI  Jessica Wang is a 67 y.o. male who presents with vertigo that started initially stated 3:30 PM and then stayed at 5 PM and then stayed at 5:30 PM.  He seen reduction about 5:30 PM.  He states is not constant. Is only when he stands up. He has had a history of vertigo in the past.  He denies a previous history of stroke. Standing up makes it worse and rest makes it better. He states he has had loss of vision in the right visual field for \"a while\" but cannot be definite about exact time it started. It was not today. He denies any weakness one-sided body or the other. Denies paresthesias. He denies headache. Denies any head injuries. REVIEW OF SYSTEMS  All systems negative except as noted in the HPI. Reviewed Nurses' notes and concur. History limited due to hard of hearing and poor historian. No LMP for male patient.     PAST MEDICAL HISTORY  Past Medical History:   Diagnosis Date    Atrial fibrillation ([facility] [address]     Basal cell carcinoma     right ear and right cheek    Bilateral tinnitus 2005    Bradycardia     required pacemaker     CAD (coronary artery disease)     4 stents placed     GERD (gastroesophageal reflux disease)     Hyperlipidemia     Hypertension     Osteoarthritis        FAMILY HISTORY  Family History   Problem Relation Age of Onset    Arthritis Mother     Heart Disease Father     Stroke Father     Other Brother         couldnt walk and in nursing home  disable     No Known Problems Maternal Grandmother     High Blood Pressure Maternal Grandfather     No Known Problems Paternal Grandmother     No Known Problems Paternal Grandfather        SOCIAL HISTORY   reports that he quit smoking about 52 years ago. His smoking use included cigarettes. He has a 3.50 pack-year smoking history. He has never used smokeless tobacco. He reports that he does not drink alcohol and does not use drugs. SURGICAL HISTORY  Past Surgical History:   Procedure Laterality Date    A-V CARDIAC PACEMAKER INSERTION      bradycardia     CORONARY ANGIOPLASTY WITH STENT PLACEMENT  2008    EXTERNAL EAR SURGERY Right     removed basal cell cancer    SKIN CANCER DESTRUCTION  03/2017    Right cheek       CURRENT MEDICATIONS  Current Outpatient Rx   Medication Sig Dispense Refill    atorvastatin (LIPITOR) 40 MG tablet TAKE 1 TABLET BY MOUTH AT  NIGHT 90 tablet 3    lisinopril (PRINIVIL;ZESTRIL) 40 MG tablet TAKE 1 TABLET BY MOUTH  DAILY 90 tablet 3    amLODIPine (NORVASC) 2.5 MG tablet Take 1 tablet by mouth daily 30 tablet 5    apixaban (ELIQUIS) 5 MG TABS tablet TAKE 1 TABLET BY MOUTH  TWICE DAILY 180 tablet 3    diphenhydrAMINE-Zinc Acetate (BENADRYL ITCH RELIEF EX) Apply topically as needed      fluticasone (FLONASE) 50 MCG/ACT nasal spray 2 sprays by Each Nostril route daily 3 Bottle 1    clobetasol (TEMOVATE) 0.05 % cream Apply topically 2 times daily. 60 g 0    saw palmetto 160 MG capsule Take 160 mg by mouth daily.  nitroGLYCERIN (NITROSTAT) 0.4 MG SL tablet Place 0.4 mg under the tongue every 5 minutes as needed.  Glucosamine-Chondroit-Vit C-Mn (GLUCOSAMINE CHONDROITIN COMPLX) TABS Take 1 tablet by mouth daily. ALLERGIES  Allergies   Allergen Reactions    Bee Venom Anaphylaxis       Tetanus vaccination status reviewed: tetanus re-vaccination not indicated.       PHYSICAL EXAM  VITAL SIGNS: BP (!) 189/69   Pulse 60   Temp 97.7 °F (36.5 °C) (Oral)   Resp 15   Wt 214 lb 8.1 oz (97.3 kg)   SpO2 100%   BMI 27.54 kg/m²   Constitutional: Well-developed, well-nourished, appears normal, nontoxic, activity: Resting comfortably cart, no obvious pain, he is hard of hearing. HENT: Normocephalic, Atraumatic, Bilateral external ears normal, TM's were normal, Mucus membranes are moist and oropharynx is patent and clear, No oral exudates, Nose normal.  Eyes: PERRLA, EOMI, Conjunctiva normal, No discharge. No scleral icterus. Neck: Normal range of motion, No tenderness, Supple. Lymphatic: No lymphadenopathy noted. Cardiovascular: Mildly bradycardic heart rate, Normal rhythm, no murmurs, no gallops, no rubs. Thorax & Lungs: Normal breath sounds, no respiratory distress, no wheezing, no rales, no rhonchi  Abdomen: Soft, Nontender, No hepatosplenomegaly, No masses, No pulsatile masses, No distension, normal bowel sounds  Skin: Warm, Dry, No erythema, No rash. Back: No tenderness, Full range of motion, No scoliosis. Extremities: No edema, No tenderness, No cyanosis, No clubbing. No amputations, capillary refill less than 2 seconds. Musculoskeletal:  No tenderness to palpation, no major deformities noted. Neurologic: Alert & oriented x 3, CN II through XII are intact except for a right hemianopsia, normal motor function, normal sensory function, no other focal deficits noted, no clonus, no tremors. Psychiatric: Affect normal, Mood normal.    NIH Stroke Scale-2  NIH Stroke Scale  Interval: Baseline  Level of Consciousness (1a): Alert  LOC Questions (1b): Answers both correctly  LOC Commands (1c): Performs both tasks correctly  Best Gaze (2): Normal  Visual (3): (!) Partial hemianopia  Facial Palsy (4): Normal symmetrical movement  Motor Arm, Left (5a): No drift  Motor Arm, Right (5b): No drift  Motor Leg, Left (6a): No drift  Motor Leg, Right (6b):  No drift  Limb Ataxia (7): Absent  Sensory (8): Normal  Best Language (9): No aphasia  Dysarthria (10): Normal  Extinction and Inattention (11): No abnormality  Total: 1 LABORATORY  Labs Reviewed   CBC WITH AUTO DIFFERENTIAL - Abnormal; Notable for the following components:       Result Value    Hematocrit 39.6 (*)     Lymphocytes Absolute 0.9 (*)     All other components within normal limits   PROTIME-INR - Abnormal; Notable for the following components:    Protime 15.1 (*)     INR 1.20 (*)     All other components within normal limits   TROPONIN   COMPREHENSIVE METABOLIC PANEL W/ REFLEX TO MG FOR LOW K   URINALYSIS WITH REFLEX TO CULTURE   POCT GLUCOSE     ? EKG  EKG Interpretation    Interpreted by emergency department physician  Time performed: 2238  Time read: 2240    Rhythm: Ventricular paced rhythm  Ventricular Rate: 60  QRS Axis: 4  Ectopy: None  Conduction: Atrial flutter with ventricular paced rhythm with left bundle branch block and LVH by voltage  ST Segments: Consistent with left bundle branch block  T Waves: Consistent with left bundle branch block  Q Waves: Consistent with left bundle branch block and paced rhythm    Other findings: Motion artifact but EKG is readable    Compared to EKG on: None to compare    Clinical Impression: Atrial flutter with ventricular paced rhythm with left bundle branch block and LVH by voltage. There is motion artifact but EKG is readable. There is no old EKG to compare. Rajinder Huynh, DO    RADIOLOGY/PROCEDURES  I personally reviewed the images for this case. XR CHEST PORTABLE   Final Result   No acute abnormality. CTA HEAD NECK W CONTRAST   Final Result   Impression:      Negative for large vessel occlusion or hemodynamic stenosis. There is 50% narrowing identified right proximal ICA per NASCET criteria. CT HEAD WO CONTRAST   Final Result   No acute intracranial abnormality. 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   Chronic   microvascular disease      The findings were sent to the Radiology Results [address] at 11:19   pm on 5/25/2022 to be communicated to a licensed caregiver. COURSE & MEDICAL DECISION MAKING  Pertinent Labs, EKG, & Imaging studies reviewed. (See chart for details)    Vitals:    05/25/22 2232 05/25/22 2344   BP: (!) 189/69    Pulse: 59 60   Resp: 18 15   Temp: 97.7 °F (36.5 °C)    TempSrc: Oral    SpO2: 100% 100%   Weight: 214 lb 8.1 oz (97.3 kg)        Medications   iopamidol (ISOVUE-370) 76 % injection 75 mL (75 mLs IntraVENous Given 5/25/22 2310)       New Prescriptions    No medications on file       SEP-1 CORE MEASURE DATA  Exclusion criteria: the patient is NOT to be included for sepsis due to: Infection is not suspected    Patient remained stable in the ED. CT of his head and CTA head and neck was negative. His laboratory work was negative. Chest x-ray was negative. Therefore, patient was admitted to the hospital for vertigo and rule out CVA. Hospitalist was notified of the admission at [place] [address]. Patient remained stable throughout his emergency department stay. His vertigo only occurs when he stands up. The patient's blood pressure was found to be elevated according to CMS/Medicare and the Affordable Care Act/ObamaCare criteria. Elevated blood pressure could occur because of pain or anxiety or other reasons and does not mean that they need to have their blood pressure treated or medications otherwise adjusted. However, this could also be a sign that they will need to have their blood pressure treated or medications changed. The patient was instructed to follow up closely with their personal physician to have their blood pressure rechecked. The patient was instructed to take a list of recent blood pressure readings to their next visit with their personal physician. (This chart has been completed using [address].  Although attempts have been made to ensure accuracy, words and/or phrases may not be transcribed as intended.)    Patient refused pain medicines at the time of their exam.    IMPRESSION(S):  1. Vertigo    2. Hemianopia of right eye        ? Recheck Times: 6013, 0015  Critical Care Time: 35 minutes not including billable procedures.     Diagnostic considerations include but are not limited to:  thrombotic stroke, embolic stroke, hemorrhagic stroke, TIA,  hypoglycemia, mass lesions, metabolic cause, head injury, encephalopathy, multiple sclerosis, seizure, hypoxia, Bell's palsy, Baljeet's paralysis, infection/abscesses-intracranially or other spinal cord, other          Erika Lugo DO  05/26/22 0019

## 2022-05-26 NOTE — CONSULTS
Neurology Consult Note  Reason for Consult: dizziness    Chief complaint: episodes of spinning, vomiting    Dr Mirian Monson MD asked me to see Breann Singh in consultation for evaluation of dizziness    History of Present Illness:  Breann Singh is a 67 y.o. male who presents with vertigo, nausea, vomiting. I obtained my information via interview w/ the patient, supplemented by chart review. Over the past 3 weeks or so, the patient has experienced two prolonged episodes of a spinning sensation. He says that the episodes last for a couple of hours and he does have some associated nausea and vomiting. If he remains still and settles down, his symptoms do improve though don't completely go resolve. With any sort of activity, movement, or changes in position he does say that this aggravates his symptoms. His most recent episode of on Wednesday evening and decided to come to the hospital in order to be evaluated. /69. CT head was w/out any acute findings. CTA head/neck no LVO or VBI. NIH 0. Today he feels OK. He was able to ambulate w/out any problem. He does have a hx of L PCA infarct w/ R visual field deficit. He takes Eliquis consistently for atrial fibrillation.       Medical History:  Past Medical History:   Diagnosis Date    Atrial fibrillation ([facility] [address]     Basal cell carcinoma     right ear and right cheek    Bilateral tinnitus 2005    Bradycardia     required pacemaker     CAD (coronary artery disease)     4 stents placed     GERD (gastroesophageal reflux disease)     Hyperlipidemia     Hypertension     Osteoarthritis      Past Surgical History:   Procedure Laterality Date    A-V CARDIAC PACEMAKER INSERTION      bradycardia     CORONARY ANGIOPLASTY WITH STENT PLACEMENT  2008    EXTERNAL EAR SURGERY Right     removed basal cell cancer    SKIN CANCER DESTRUCTION  03/2017    Right cheek     Scheduled Meds:   amLODIPine  2.5 mg Oral Daily    apixaban  5 mg Oral BID    atorvastatin  40 mg Oral Nightly    fluticasone  2 spray Each Nostril Daily    lisinopril  40 mg Oral Daily    aspirin  81 mg Oral Daily    Or    aspirin  300 mg Rectal Daily     Medications Prior to Admission:   atorvastatin (LIPITOR) 40 MG tablet, TAKE 1 TABLET BY MOUTH AT  NIGHT  lisinopril (PRINIVIL;ZESTRIL) 40 MG tablet, TAKE 1 TABLET BY MOUTH  DAILY  amLODIPine (NORVASC) 2.5 MG tablet, Take 1 tablet by mouth daily  apixaban (ELIQUIS) 5 MG TABS tablet, TAKE 1 TABLET BY MOUTH  TWICE DAILY  diphenhydrAMINE-Zinc Acetate (BENADRYL ITCH RELIEF EX), Apply topically as needed  fluticasone (FLONASE) 50 MCG/ACT nasal spray, 2 sprays by Each Nostril route daily  clobetasol (TEMOVATE) 0.05 % cream, Apply topically 2 times daily. (Patient taking differently: 2 times daily as needed Apply topically 2 times daily.)  saw palmetto 160 MG capsule, Take 160 mg by mouth daily. nitroGLYCERIN (NITROSTAT) 0.4 MG SL tablet, Place 0.4 mg under the tongue every 5 minutes as needed. Glucosamine-Chondroit-Vit C-Mn (GLUCOSAMINE CHONDROITIN COMPLX) TABS, Take 1 tablet by mouth daily.       Allergies   Allergen Reactions    Bee Venom Anaphylaxis     Family History   Problem Relation Age of Onset    Arthritis Mother     Heart Disease Father     Stroke Father     Other Brother         couldnt walk and in nursing home  disable     No Known Problems Maternal Grandmother     High Blood Pressure Maternal Grandfather     No Known Problems Paternal Grandmother     No Known Problems Paternal Grandfather      Social History     Tobacco Use   Smoking Status Former Smoker    Packs/day: 0.50    Years: 7.00    Pack years: 3.50    Types: Cigarettes    Quit date: 1970    Years since quittin.4   Smokeless Tobacco Never Used     Social History     Substance and Sexual Activity   Drug Use No     Social History     Substance and Sexual Activity   Alcohol Use No     ROS  Constitutional- No weight loss or fevers  Eyes- No diplopia. No photophobia. Ears/nose/throat- No dysphagia. No Dysarthria  Cardiovascular- No palpitations. No chest pain  Respiratory- No dyspnea. No Cough  Gastrointestinal- No Abdominal pain. +Vomiting. Genitourinary- No incontinence. No urinary retention  Musculoskeletal- No myalgia. No arthralgia  Skin- No rash. No easy bruising. Psychiatric- No depression. No anxiety  Endocrine- No diabetes. No thyroid issues. Hematologic- No bleeding difficulty. No fatigue  Neurologic- No weakness. No Headache. Exam  Blood pressure (!) 168/71, pulse 60, temperature 98.3 °F (36.8 °C), temperature source Oral, resp. rate 14, height 6' 2\" (1.88 m), weight 207 lb 14.3 oz (94.3 kg), SpO2 99 %. Constitutional    Vital signs: BP, HR, and RR reviewed   General alert, no distress  Eyes: unable to visualize the fundi  Cardiovascular: no peripheral edema. Psychiatric: cooperative with examination, no psychotic behavior noted. Neurologic  Mental status:   orientation to person, place, time. General fund of knowledge grossly intact   Memory grossly intact   Attention intact as able to attend well to the exam     Language fluent in conversation   Comprehension intact; follows simple commands  Cranial nerves:   CN2: R hemianopsia. CN 3,4,6: extraocular muscles intact  CN5: facial sensation symmetric   CN7: perhaps a bit of R facial weakness at rest though activates well. No dysarthria. CN8: hard of hearing. CN9: palate elevated symmetrically  CN11: trap full strength on shoulder shrug  CN12: tongue midline with protrusion  Strength: good strength in all 4 extremities. ROM in LUE a bit limited. Deep tendon reflexes: normal in all 4 extremities  Sensory: light touch intact in all 4 extremities.    Cerebellar/coordination: finger nose finger normal without ataxia  Tone: normal in all 4 extremities  Gait: normal gait    Labs  Glucose 125  Na 140  K 3.7  BUN 12  Cr 1.1    ALT 20  AST 30    WBC 4.7K  Hg 13.7  Platelets 308    UA negative    Studies  CT head w/o 5/25/22, independently reviewed  No acute intracranial abnormality. 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  Chronic   microvascular disease     CTA head/neck 5/25/22, independently reviewed  Negative for large vessel occlusion or hemodynamic stenosis. There is 50% narrowing identified right proximal ICA per NASCET criteria. Impression  1. Prolonged episodes of vertigo w/ associated nausea and vomiting. There seems to be a positional component. This is likely most consistent w/ a peripheral vestibulopathy. He did not report any focal neurologic deficits. He has no vertebrobasilar disease. 2.  Atrial fibrillation. 3.  Pacemaker. 4.  Moderate R ICA stenosis. Recommendations  1. Will follow results of brain MRI. 2.  Continue home Eliquis for stroke prevention. Would not add asa from neurology standpoint based on these events. 3.  PT/OT.       Omar Rizvi NP  [address] [place] [address] [address] Neurology    A copy of this note was provided for Dr Nasim Qureshi MD

## 2022-05-26 NOTE — PROGRESS NOTES
Speech Language Pathology  Facility/Department: [facility] 3G PROGRESSIVE CARE  SLP Clinical Swallow Evaluation and Speech Language Cognitive Assessment     Patient: Margo Tate   : 1945   MRN: [id number]      Evaluation Date: 2022      Admitting Dx: Dizziness [R42]; Vertigo [R42]; Hemianopia of right eye [H53.461]  ·    has a past medical history of Atrial fibrillation ([facility] [address] Basal cell carcinoma, Bilateral tinnitus (), Bradycardia, CAD (coronary artery disease), GERD (gastroesophageal reflux disease), Hyperlipidemia, Hypertension, and Osteoarthritis. onset:   2022                         Chart Review:   · H&P: Per MD documentation  History Of Present Illness:     74 y.o. male with PMHx of A-fib, CAD, GERD, HLD and HTN presented to [facility] with episode of dizziness began about 3:30 PM and is intermittent. He notices the dizziness mostly with standing. But seems to be positional.  He does have history of vertigo. He has been dealing with tinnitus for quite some time. Patient denies extremity weakness. No numbness or tingling. No headache. No difficulties with speech or swallowing. He denies any known prior history of stroke.     Pain: left shoulder pain (chronic). Alerted nursing     Current diet: regular consistency    Imaging:  Chest X-ray: 2022  Impression   No acute abnormality. Head CT: 2022  Impression   No acute intracranial abnormality. 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  Chronic   microvascular disease   The findings were sent to the Radiology Results [address] at 11:19   pm on 2022 to be communicated to a licensed caregiver. CTA Head Neck: 2022  Impression   No acute intracranial abnormality.    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  Chronic   microvascular disease   The findings were sent to the Radiology Results [address] at 11:19   pm on 2022 to be communicated to a licensed caregiver. MRI Brain ordered; but not completed as this date/time    History/Prior Level of Function:   Living Status: Lives with spouse. Does not drive. Spouse takes care of home management like cooking; cleaning; medial appointments. . Son does the yard work. Pt does self report assists with banking and reports IND with ambulation and basic ADLx  Prior Dysphagia History: denies  Prior Speech History: Hearing impacts my use of the telephone; sometimes my memory will fade. Reason for referral: SLP evaluation orders received due to CVA protocol workup. DYSPHAGIA BEDSIDE SWALLOW EVALUATION   Dysphagia Impressions/Dysphagia Diagnosis: Oropharyngeal Dysphagia   1. Dysphagia Diagnosis: Pt with oropharyngeal Dysphagia characterized by prolonged, at times incomplete mastication; disorganized bolus manipulation A-P of regular solid foods; delayed initiation of swallow. · Pt tolerated all thin and thick liquids; puree; mech soft and soft bite size and easy to chew foods without complaints or overt clinical s/s of aspiration or voice quality changes post swallow  · Pt with complaints of problems chewing regular hard solid food consistencies without upper denture (reportedly at home) and only a few bottom teech  Recommend trial downgrade to 'easy to chew food consistencies' and continue thin liquids. · Discussed with RN      Recommended Diet and Intervention:  Diet Solids Recommendation:  Easy to chew      Pt reports will try to get upper dentures in and hopefully will upgrade to regular Liquid Consistency Recommendation:   Thin liquids  Recommended form of Meds: Other  as desired by pt; if problems with pills with H20 may need with puree        Dysphagia Therapeutic Intervention:  Diet Tolerance Monitoring , Patient/Family Education , Therapeutic Trials with SLP     Compensatory Swallowing Strategies:  Upright as possible with all PO intake , Small bites/sips , Swallow 2 times per bite , Remain upright 30-45 min     TEST DATA:   Patient Positioning: Upright in bed ~80 to 90 degrees    Consistencies presented:  thin liquids; mildly/nectar thick; moderately / honey thick liquids; puree; mech soft food consistencies; soft/bite size; easy to chew; regular hard solid foods    Oral Mechanism Exam:  [x]WFL []Mild   [] Moderate  []Severe  []To be assessed  Good symmetrical volitional rom and strength for labial/buccal//lingual rom  Volitional cough: strong  Volitional swallow: potential delay  Voice: unremarkable    Oral Phase: [x]WFL [x]Mild   [] Moderate  []Severe  []To be assessed   [x]Impaired/Prolonged Mastication:  Appears more related to pt not having upper denture and limited dentiton versus neuromuscular   []Spillage Left:   []Spillage Right:  []Pocketing Left:   []Pocketing Right:   [x]Decreased Anterior to Posterior Transit:dry foods   []Suspected Premature Bolus Loss:   [x]Lingual/Palatal Residue: spontaneously clears   [x]Other: will downgrade to easy to chew due to pt not having upper denture and few lower dentition .  Pt will try to get upper denture in     Pharyngeal Phase: [x]WFL [x]Mild   [] Moderate  []Severe  []To be assessed   [x]Delayed Swallow: Potential   []Suspected Pharyngeal Pooling:   []Decreased Laryngeal Elevation:   []Absent Swallow:  []Wet Vocal Quality:   []Throat Clearing-Immediate:   []Throat Clearing-Delayed:   []Cough-Immediate:   []Cough-Delayed:  []Change in Vital Signs:  []Suspected Delayed Pharyngeal Clearing:  [x]Other:  No overt clinical s/s of aspiration or voice quality changes or pt complaints post swallow    SHORT TERM DYSPHAGIA GOALS  Pt will functionally tolerate recommended diet with no overt clinical s/s of aspiration   Pt will demonstrate understanding of aspiration risk and precautions via education/demonstration with occasional prompting  Pt will advance to least restrictive diet as indicated     Dysphagia Prognosis: good       SPEECH LANGUAGE COGNITIVE ASSESSMENT:     Speech Diagnosis: MS status changes and Neuro workup. Pt does deny status change in communication    Impressions:   1. Pt was oriented x 4  2. Pt with hearing impairment and no hearing aides (reports has them but does not use) which can impact auditory processing requiring visual cues; repetition. Pt with improved rate of processing with visual language processing (large print utilized). No dysphasia. 3. Verbal expressive language skills appeared functional for naming tasks; concept explanation tasks and expressing needs and wants. 4. Cognitive-linguistic deficits in domains of higher level attention; working memory and STM which again hearing deficits can impact. Pt demonstrated concrete VPS/PS; but assist with small sample of complex PS/VPS. Plan for further assessment unless otherwise notified. Test Data:   Vision: Wears Glasses all the time. Right eye problems and followed by ophthalmology    Hearing: [facility]; Has hearing aids but does not use them    COMPREHENSION  Auditory Comprehension: Mild ; Hearing deficits impact  Functional Ability to Comprehend Basic Commands/Questions  but hearing deficits noted and repetition needed  Most optima for one step commands . R/L discrimination WFL  Increase external cues/repetition and visual cues with complex questions and multiple step commands    Visual Language Processing:   WFL for concrete visual stimuli (large print utilized as this SLP did not see glasses)    EXPRESSION  Verbal Expressive Language: Within functional limits  for concrete and expressing needs/wants. Ongoing assess of complex  WFL for naming and concrete concept explanation  Ongoing assessment of complex     Written Expressive Language:  DNT. Pt is right handed    Pragmatics/Social Functioning: Within functional limits   Pt is aware of impact of hearing      MOTOR SPEECH  Motor Speech: Within functional limits     VOICE  Voice:  Within functional limits       COGNITION Overall Orientation : Within functional limits    Oriented x4    Attention: Mild    Impaired Alternating Attention  Impaired Divided Attention   Ongoing assessment as hearing does  impact    Memory: Mild    Impaired Short-term Memory  Impaired Recall of New Learning   Impaired Immediate/working Memory   Ongoing assessment as hearing does impact    Problem Solving: Within functional limits  for concrete VPS ongoing assessment   Able to complete simple functional tasks; concrete math calc [facility]; ongoing assessment with complex    Safety/Judgement: To be assessed      GOALS:  Short Term Speech/Language/Cognitive Goals:   Pt will improve orientation and short term recall via graded tasks to 80%  Pt will participate in ongoing cognitive assessment with goals to be established as indicated   Pt with improve functional verbal problem solving and numeric reasoning PS via graded tasks to 80% min cues     Plan of care: 3-5 times per week during acute care stay.  ;unless otherwise notified as this may be baseline      SLP Prognosis:   Good guarded as pt is still undergoing neuro diagnostic work up    Discharge Recommendations:  Discharge recommendations to be determined pending ongoing follow-up during acute care stay    EDUCATION:   Provided education regarding role of SLP, results of assessment, recommendations and general speech pathology plan of care. [x] Pt verbalized understanding and agreement   [x] Pt requires ongoing learning   [] No evidence of comprehension     If patient discharges prior to next visit, this note will serve as discharge. Time code minutes:  0  Total Time:  0740 to 0815 = 35 minutes of time for RAÚL and SLP evaluation completed    Electronically signed by: Farheen Ramey,MS,CCC,SLP [id number]  Speech and Language Pathologist  5/26/2022 [id number]

## 2022-05-26 NOTE — PLAN OF CARE
Problem: Discharge Planning  Goal: Discharge to home or other facility with appropriate resources  5/26/2022 1040 by Rosa Stanford RN  Outcome: Progressing  Sw involved in d/c planning as needed     Problem: Safety - Adult  Goal: Free from fall injury  5/26/2022 1040 by Rosa Stanford RN  Outcome: Progressing  Patient is wearing nonskid socks. Bed is alarmed, locked, and in lowest position. Room is free of clutter. Call light is within reach and used appropriately. Fall risk assessed per protocol. Will continue to monitor.       Problem: Neurosensory - Adult  Goal: Achieves stable or improved neurological status  Outcome: Progressing  No decreased in sensation  Goal: Achieves maximal functionality and self care  Outcome: Progressing   Decreased mobility in LUE     Problem: ABCDS Injury Assessment  Goal: Absence of physical injury  Outcome: Progressing   Up with minimal assistance

## 2022-05-26 NOTE — TELEPHONE ENCOUNTER
Pt is inpatient at [facility] and has a pacemaker that is MRI compatible, needs note sent to nurses that he can proceed with MRI    Pt cb [phone]

## 2022-05-26 NOTE — PROGRESS NOTES
Patient seen and examined earlier today by one of my colleagues. Briefly, this is a 68 y/o M who had two episode of transient vertigo, made worse with movement of his head with a room spinning sensation noted. CT Head without contrast showed no acute intracranial abnormality, and a remote left PCA distribution stroke with resultant encephalomalacia. CTA Head and Neck with contrast showed a 50% narrowing in the right proximal ICA. MRI Brain without contrast is pending and may not be feasible with the patient's pacemaker. The vertigo etiology is likely felt to be peripheral in origin after evaluation by neurology. Will see if we can obtain the MRI tomorrow. Patient may benefit from vestibular rehab at discharge.     Hina Anne MD, MPH  Hospitalist  Pager: [phone]

## 2022-05-26 NOTE — PROGRESS NOTES
Physical Therapy  Facility/Department: [facility] 0U PROGRESSIVE CARE  Physical Therapy Initial Assessment    Name: Georgie Deluna  : 1945  MRN: [id number]  Date of Service: 2022    Discharge Recommendations:  Home with assist PRN (if dizziness thought to be BPPV then may benefit from OP vestibular therapy)   PT Equipment Recommendations  Equipment Needed: No    Georgie Deluna scored a 19/24 on the AM-PAC short mobility form. At this time, if dizziness persists and thought to be related to BPPV then OP vestibular therapy may be beneficial.  Recommend patient returns to prior setting with prior services. Patient Diagnosis(es): The primary encounter diagnosis was Vertigo. A diagnosis of Hemianopia of right eye was also pertinent to this visit. Past Medical History:  has a past medical history of Atrial fibrillation ([facility] [address] Basal cell carcinoma, Bilateral tinnitus, Bradycardia, CAD (coronary artery disease), GERD (gastroesophageal reflux disease), Hyperlipidemia, Hypertension, and Osteoarthritis. Past Surgical History:  has a past surgical history that includes Coronary angioplasty with stent (); External ear surgery (Right); Skin cancer destruction (2017); and A-V cardiac pacemaker insertion. Assessment   Body Structures, Functions, Activity Limitations Requiring Skilled Therapeutic Intervention: Decreased functional mobility   Assessment: pt is a 66 yo male who was adm to hosp with dizziness; pt reports the room was spinning but now dizziness has resolved; no dizziness with position changes or with head movements; pt appears close to his baseline for mobility tasks; if dizziness thought to be BPPV may benefit from OP vestibular therapy to address vertigo.   Will follow while in hosp to ensure that pt's dizziness is resolved and he is safe to return home  Therapy Prognosis: Good  Decision Making: Low Complexity  Barriers to Learning: none  Requires PT Follow-Up: Yes  Activity Tolerance  Activity Tolerance: Patient tolerated treatment well     Plan   Plan  Plan:  (1-2 more visits)  Current Treatment Recommendations: Balance training  Safety Devices  Type of Devices: Chair alarm in place,Call light within reach,Left in chair,Gait belt,Nurse notified     Restrictions  Restrictions/Precautions  Restrictions/Precautions: Fall Risk     Subjective   General  Chart Reviewed: Yes  Patient assessed for rehabilitation services?: Yes  Additional Pertinent Hx: per H&P note: \"70 y.o. male with PMHx of A-fib, CAD, GERD, HLD and HTN presented to [facility] with episode of dizziness began about 3:30 PM and is intermittent. He notices the dizziness mostly with standing. But seems to be positional.  He does have history of vertigo. He has been dealing with tinnitus for quite some time. Patient denies extremity weakness. No numbness or tingling.  \"  Response To Previous Treatment: Not applicable  Family / Caregiver Present: No  Referring Practitioner: TATI Lu CNP  Referral Date : 05/26/22  Follows Commands: Within Functional Limits  Subjective  Subjective: pt very pleasant and agreeable to working with therapy; pt reports he is a little afraid to move but states the dizziness has resolved right now         Social/Functional History  Social/Functional History  Lives With: Spouse  Type of Home: House  Home Layout: One level  Home Access: Stairs to enter with rails  Entrance Stairs - Number of Steps: 2 SANTY  Bathroom Shower/Tub: Tub/Shower unit  Bathroom Toilet: Handicap height (vanity)  Bathroom Equipment: Shower chair  Home Equipment: Rubie Mcardle, 4 wheeled  Has the patient had two or more falls in the past year or any fall with injury in the past year?: No  ADL Assistance: Independent  Homemaking Assistance:  (spouse completes [de-identified] of IADLs)  Ambulation Assistance: Independent (no AD)  Transfer Assistance: Independent  Active : No  Vision/Hearing  Vision Exceptions: Wears glasses at all times  Hearing: Exceptions to [facility] (has hearing aids but does not wear)  Hearing Exceptions: Hard of hearing/hearing concerns;Bilateral hearing aid    Cognition   Orientation  Overall Orientation Status: Within Functional Limits  Cognition  Overall Cognitive Status: WFL     Objective   Heart Rate: 60  Heart Rate Source: Monitor  BP: (!) 147/77  BP Location: Right upper arm  Patient Position: Semi fowlers  MAP (Calculated): 100.33  Resp: 16  SpO2: 97 %  O2 Device: None (Room air)              AROM RLE (degrees)  RLE AROM: WFL  AROM LLE (degrees)  LLE AROM : WFL  Strength RLE  Strength RLE: WFL  Strength LLE  Strength LLE: WFL           Bed mobility  Supine to Sit: Modified independent  Transfers  Sit to Stand: Stand by assistance  Stand to sit: Stand by assistance  Ambulation  Surface: level tile  Device: No Device  Assistance: Stand by assistance  Quality of Gait: short choppy steps but no LOB  Distance: 200'     Balance  Sitting - Static: Good  Sitting - Dynamic: Good  Standing - Static: Fair;+  Standing - Dynamic: Fair;+           OutComes Score                                                  AM-PAC Score  AM-PAC Inpatient Mobility Raw Score : 19 (05/26/22 1004)  AM-PAC Inpatient T-Scale Score : 45.44 (05/26/22 1004)  Mobility Inpatient CMS 0-100% Score: 41.77 (05/26/22 1004)  Mobility Inpatient CMS G-Code Modifier : CK (05/26/22 1004)          Goals  Long Term Goals  Time Frame for Long term goals : by discharge  Long term goal 1: transfers Ind  Long term goal 2: amb 200' without AD sup/MI  Long term goal 3: negotiate stairs SBA  Patient Goals   Patient goals : to get home and not have this dizziness       Education  Patient Education  Education Given To: Patient  Education Provided: Role of Therapy  Education Provided Comments: reviewed call light and not getting up without assist  Education Method: Verbal  Education Outcome: Verbalized understanding      Therapy Time   Individual Concurrent Group Co-treatment   Time In 0924         Time Out 1005         Minutes 41                 ADONAY KABA PT    Electronically signed by ADONAY KABA PT on 5/26/2022 at 10:05 AM

## 2022-05-26 NOTE — PROGRESS NOTES
Occupational Therapy  Facility/Department: [address] PROGRESSIVE CARE  Occupational Therapy Initial Assessment    Name: Alia Ernst  : 1945  MRN: [id number]  Date of Service: 2022    Discharge Recommendations:  Home with assist PRN  OT Equipment Recommendations  Equipment Needed: No     Alia Ernst scored a 21/24 on the AM-[facility] ADL Inpatient form. At this time, no further OT is recommended upon discharge. Recommend patient returns to prior setting with prior services. Patient Diagnosis(es): The primary encounter diagnosis was Vertigo. A diagnosis of Hemianopia of right eye was also pertinent to this visit. Past Medical History:  has a past medical history of Atrial fibrillation ([facility] [address] Basal cell carcinoma, Bilateral tinnitus, Bradycardia, CAD (coronary artery disease), GERD (gastroesophageal reflux disease), Hyperlipidemia, Hypertension, and Osteoarthritis. Past Surgical History:  has a past surgical history that includes Coronary angioplasty with stent (); External ear surgery (Right); Skin cancer destruction (2017); and A-V cardiac pacemaker insertion. Assessment   Assessment: 66 y/o male admitted 2022 with dizziness. PTA pt lived at home with spouse and was independent with ADLs and functional mobility. Today, pt required SBA for transfers and completed functional mobility around room and in torrez without AD and SBA. Pt denied dizziness with activity or head turns and reports feeling close to baseline. Pt with functional UE ROM for self care and anticipate will be SBA for ADLs. Pt safe to return home and no further OT is warranted. Prognosis: Good  Decision Making: Low Complexity  No Skilled OT: Safe to return home; No OT goals identified  REQUIRES OT FOLLOW-UP: No  Activity Tolerance  Activity Tolerance: Patient Tolerated treatment well  Activity Tolerance Comments: Denied dizziness with position changes and activity.  Reports feeling close to baseline        Plan Plan  Times per Week: DC acute OT     Restrictions  Restrictions/Precautions  Restrictions/Precautions: Fall Risk    Subjective   General  Chart Reviewed: Yes  Patient assessed for rehabilitation services?: Yes  Additional Pertinent Hx: 66 y/o male admitted 5/25/2022 with dizziness. head CT neg for acute pathology, remote left PCA distribution stroke with resultant encephalomalacia. Chronic microvascular disease. MRI pending. He has been dealing with tinnitus for quite some time. Pt does have a history of vertigo  Family / Caregiver Present: No  Referring Practitioner: TATI Mehta CNP  Diagnosis: dizziness  Subjective  Subjective: Pt seen bedside and agreeable to therapy. Pt denied dizziness throughout session with position changes and activity. General Comment  Comments: Per RN ok for therapy     Social/Functional History  Social/Functional History  Lives With: Spouse  Type of Home: House  Home Layout: One level  Home Access: Stairs to enter with rails  Entrance Stairs - Number of Steps: 2 SANTY  Bathroom Shower/Tub: Tub/Shower unit  Bathroom Toilet: Handicap height (vanity)  Bathroom Equipment: Shower chair  Home Equipment: Yoana Ply, 4 wheeled  Has the patient had two or more falls in the past year or any fall with injury in the past year?: No  ADL Assistance: [address]:  (spouse completes majority of IADLs)  Ambulation Assistance: Independent (no AD)  Transfer Assistance: Independent  Active : No       Objective     Vision Exceptions: Wears glasses at all times  Hearing: Exceptions to [facility] (has hearing aids but does not wear)  Hearing Exceptions: Hard of hearing/hearing concerns;Bilateral hearing aid          Safety Devices  Type of Devices: Chair alarm in place;Call light within reach; Left in chair;Gait belt;Nurse notified        AROM: Within functional limits  Strength:  Within functional limits  Coordination: Within functional limits     ADL  Additional Comments: Declined ADLs. Pt demo ability to cross LE over knee to [facility] Dressing. Anticipate pt will be supervision for ADLs based on balance and endurance observed     Activity Tolerance  Activity Tolerance: Patient tolerated treatment well  Bed mobility  Supine to Sit: Modified independent  Sit to Supine:  (pt in chair at end of session)     Transfers  Sit to stand: Stand by assistance  Stand to sit: Stand by assistance     Pt completed functional mobility around room and in torrez with SBA and no AD. Pt denied dizziness throughout mobility and even with head turns. Cognition  Overall Cognitive Status: WFL                  Education Given To: Patient  Education Provided: Role of Therapy;Plan of Care;Transfer Training  Education Method: Demonstration;Verbal  Barriers to Learning: None  Education Outcome: Verbalized understanding     LUE AROM (degrees)  LUE AROM : WFL  Left Hand AROM (degrees)  Left Hand AROM: WFL  RUE AROM (degrees)  RUE AROM : WFL  Right Hand AROM (degrees)  Right Hand AROM: WFL       AM-PAC Score     AM-PAC Inpatient Daily Activity Raw Score: 21 (05/26/22 1003)  AM-PAC Inpatient ADL T-Scale Score : 44.27 (05/26/22 1003)  ADL Inpatient CMS 0-100% Score: 32.79 (05/26/22 1003)  ADL Inpatient CMS G-Code Modifier : Lindsay Cantu (05/26/22 1003)    Goals  Short Term Goals  Time Frame for Short term goals: No acute OT goals identified  Patient Goals   Patient goals : to return home       Therapy Time   Individual Concurrent Group Co-treatment   Time In 0928         Time Out 1000         Minutes 32         Timed Code Treatment Minutes: 15 Minutes     This note to serve as OT d/c summary if pt is d/c-ed prior to next therapy session.     Derek , OTR/L

## 2022-05-26 NOTE — ACP (ADVANCE CARE PLANNING)
Advance Care Planning     Advance Care Planning Inpatient Note  [facility] Department    Today's Date: 2022  Unit: [facility] PROGRESSIVE CARE    Received request from [employer]. Upon review of chart and communication with care team, patient's decision making abilities are not in question. . Patient was/were present in the room during visit. Goals of ACP Conversation:  Discuss advance care planning documents    Health Care Decision Makers:       Primary Decision Maker: Valerie Jade - [phone]    Summary:  No Decision Maker named by patient at this time    Advance Care Planning Documents (Patient Wishes):  None     Assessment:  Per pt lives he in [place] [address] but gets most of his healthcare in [place]. Pt tearfully said said that one of his sons recently .      Interventions:  Provided education on documents for clarity and greater understanding  Discussed and provided education on state decision maker hierarchy  Encouraged ongoing ACP conversation with future decision makers and loved ones  Reviewed but did not complete ACP document    Care Preferences Communicated:   No    Outcomes/Plan:  ACP Discussion: Postponed  Pt to follow-up    Electronically signed by Darrel Fortune on 2022 at 9:23 AM

## 2022-05-26 NOTE — H&P
Hospital Medicine History & Physical      PCP: Gila Love MD    Date of Admission: 5/25/2022    Date of Service: Pt seen/examined on 5/25/2022 and Placed in Observation. Chief Complaint:  dizziness      History Of Present Illness:      67 y.o. male with PMHx of A-fib, CAD, GERD, HLD and HTN presented to [facility] with episode of dizziness began about 3:30 PM and is intermittent. He notices the dizziness mostly with standing. But seems to be positional.  He does have history of vertigo. He has been dealing with tinnitus for quite some time. Patient denies extremity weakness. No numbness or tingling. No headache. No difficulties with speech or swallowing. He denies any known prior history of stroke. Past Medical History:          Diagnosis Date    Atrial fibrillation ([address]     Basal cell carcinoma     right ear and right cheek    Bilateral tinnitus 2005    Bradycardia     required pacemaker     CAD (coronary artery disease)     4 stents placed     GERD (gastroesophageal reflux disease)     Hyperlipidemia     Hypertension     Osteoarthritis        Past Surgical History:          Procedure Laterality Date    A-V CARDIAC PACEMAKER INSERTION      bradycardia     CORONARY ANGIOPLASTY WITH STENT PLACEMENT  2008    EXTERNAL EAR SURGERY Right     removed basal cell cancer    SKIN CANCER DESTRUCTION  03/2017    Right cheek       Medications Prior to Admission:      Prior to Admission medications    Medication Sig Start Date End Date Taking?  Authorizing Provider   atorvastatin (LIPITOR) 40 MG tablet TAKE 1 TABLET BY MOUTH AT  NIGHT 3/10/22   Adrian Campbell, APRN - NP   lisinopril (PRINIVIL;ZESTRIL) 40 MG tablet TAKE 1 TABLET BY MOUTH  DAILY 3/10/22   Adrian Campbell, APRN - NP   amLODIPine (NORVASC) 2.5 MG tablet Take 1 tablet by mouth daily 2/9/22   Yazan White MD   apixaban (ELIQUIS) 5 MG TABS tablet TAKE 1 TABLET BY MOUTH  TWICE DAILY 6/7/21   Abhijit Crandall MD diphenhydrAMINE-Zinc Acetate (BENADRYL ITCH RELIEF EX) Apply topically as needed    Historical Provider, MD   fluticasone (FLONASE) 50 MCG/ACT nasal spray 2 sprays by Each Nostril route daily 8/15/19   Carlie Oliva MD   clobetasol (TEMOVATE) 0.05 % cream Apply topically 2 times daily. 8/15/19   Carlie Oliva, MD   saw palmetto 160 MG capsule Take 160 mg by mouth daily. Historical Provider, MD   nitroGLYCERIN (NITROSTAT) 0.4 MG SL tablet Place 0.4 mg under the tongue every 5 minutes as needed. Historical Provider, MD   Glucosamine-Chondroit-Vit C-Mn (GLUCOSAMINE CHONDROITIN COMPLX) TABS Take 1 tablet by mouth daily. Historical Provider, MD       Allergies:  Bee venom    Social History:        TOBACCO:   reports that he quit smoking about 52 years ago. His smoking use included cigarettes. He has a 3.50 pack-year smoking history. He has never used smokeless tobacco.  ETOH:   reports no history of alcohol use. Family History:      Reviewed in detail positive as follows:        Problem Relation Age of Onset    Arthritis Mother     Heart Disease Father     Stroke Father     Other Brother         couldnt walk and in nursing home  disable     No Known Problems Maternal Grandmother     High Blood Pressure Maternal Grandfather     No Known Problems Paternal Grandmother     No Known Problems Paternal Grandfather        REVIEW OF SYSTEMS:   Pertinent positives as noted in the HPI. All other systems reviewed and negative. PHYSICAL EXAM PERFORMED:    BP (!) 189/69   Pulse 60   Temp 97.7 °F (36.5 °C) (Oral)   Resp 15   Wt 214 lb 8.1 oz (97.3 kg)   SpO2 100%   BMI 27.54 kg/m²     General appearance:  Well developed, well nourished, elderly  male lying on ED cart in no apparent distress, appears stated age and cooperative. HEENT:  Normal cephalic, atraumatic without obvious deformity. Pupils equal, round, and reactive to light. Conjunctivae/corneas clear.   Neck: Supple, with full range of motion. No jugular venous distention. Trachea midline. Respiratory:  Normal respiratory effort. Clear to auscultation bilaterally without accessory muscle use. Cardiovascular:  Regular rate and rhythm without murmurs, no lower extremity edema. Abdomen: Soft, non-tender, non-distended, without rebound or guarding. Normal bowel sounds. Musculoskeletal:  Moves all extremities equally. Full range of motion without deformity. Skin: Skin warm, dry and intact. No rashes or lesions. Neurologic:  Neurovascularly intact without any focal sensory/motor deficits. Cranial nerves: II-XII intact, grossly non-focal.  Psychiatric:  Alert and oriented, thought content appropriate, normal insight  Capillary Refill: Brisk,< 3 seconds   Peripheral Pulses: +2 palpable, equal bilaterally       Labs:     Recent Labs     05/25/22  2325   WBC 4.7   HGB 13.7   HCT 39.6*        Recent Labs     05/25/22 2325      K 3.7      CO2 21   BUN 12   CREATININE 1.1   CALCIUM 9.6     Recent Labs     05/25/22 2325   AST 30   ALT 20   BILITOT 1.5*   ALKPHOS 94     Recent Labs     05/26/22  0005   INR 1.20*     Recent Labs     05/25/22 2325   TROPONINI <0.01       Urinalysis:      Lab Results   Component Value Date    NITRU Negative 06/19/2018    BLOODU Negative 06/19/2018    SPECGRAV 1.010 06/19/2018    GLUCOSEU Negative 06/19/2018       Radiology:     EKG:  I have reviewed the EKG with the following interpretation: Ventricularly paced rhythm, rate 60. XR CHEST PORTABLE   Final Result   No acute abnormality. CTA HEAD NECK W CONTRAST   Final Result   Impression:      Negative for large vessel occlusion or hemodynamic stenosis. There is 50% narrowing identified right proximal ICA per NASCET criteria. CT HEAD WO CONTRAST   Final Result   No acute intracranial abnormality. 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   Chronic   microvascular disease      The findings were sent to the Radiology Results [address] at 11:19   pm on 5/25/2022 to be communicated to a licensed caregiver. ASSESSMENT:    Active Hospital Problems    Diagnosis Date Noted    Dizziness [R42] 05/26/2022     Priority: Medium         PLAN:    Possible TIA/CVA  - with symptoms: dizziness  - admit to OBS to RO TIA/CVA  - head CT neg for acute pathology, remote left PCA distribution stroke with resultant encephalomalacia. Chronic microvascular disease.  - CTA head/neck: No large vessel occlusion or hemodynamic stenosis. There is 50% narrowing identified right proximal ICA. - MRI in a.m.  - ASA, statin  - consult neurology   - check lipids, HBA1c  - allow permissive HTN, SBP < 220, DBP < 110    CAD s/p SAHARA LAD 2008  - PPM implant 6/20/18 (symptomatic bradycardia)  - continue statin  - stable    Atrial Fibrillation   - currently rate controlled  - continue eliquis      Essential (primary) hypertension   - monitor blood pressure  - continue home meds     Hyperlipidemia   - continue statin    DVT Prophylaxis: Eliquis  Diet: Diet NPO  Code Status: Full Code    PT/OT Eval Status: pending    [address] [facility], APRN - CNP    Thank you Jerson Sinclair MD for the opportunity to be involved in this patient's care. If you have any questions or concerns please feel free to contact me at [phone].

## 2022-05-26 NOTE — PROGRESS NOTES
We received remote transmission from patient's Micra pacemaker monitor at home (Eliquis). Transmission shows normal sensing and pacing function.  99.2%. Slight increasing Capture Threshold trend noted. No observations based on current interrogation. EP physician will review. See interrogation under cardiology tab in the [address] [place] [address] [address] field for more details.

## 2022-05-26 NOTE — PROGRESS NOTES
Pt admitted to room 5124 from ED. Pt alert and oriented x4. VSS. Patient oriented to room and educated on use of call light. Telemetry activated and verified with [facility]. Assessment in progress. Plan of care discussed with pt. Pt denies needs at this time.

## 2022-05-27 ENCOUNTER — APPOINTMENT (OUTPATIENT)
Dept: MRI IMAGING | Age: 77
DRG: 149 | End: 2022-05-27
Payer: MEDICARE

## 2022-05-27 VITALS
HEART RATE: 60 BPM | SYSTOLIC BLOOD PRESSURE: 163 MMHG | RESPIRATION RATE: 18 BRPM | BODY MASS INDEX: 26.68 KG/M2 | TEMPERATURE: 98 F | DIASTOLIC BLOOD PRESSURE: 82 MMHG | OXYGEN SATURATION: 98 % | WEIGHT: 207.89 LBS | HEIGHT: 74 IN

## 2022-05-27 PROBLEM — R42 VERTIGO: Status: ACTIVE | Noted: 2022-05-27

## 2022-05-27 LAB
CHOLESTEROL, TOTAL: 121 MG/DL (ref 0–199)
EKG ATRIAL RATE: 394 BPM
EKG DIAGNOSIS: NORMAL
EKG Q-T INTERVAL: 498 MS
EKG QRS DURATION: 164 MS
EKG QTC CALCULATION (BAZETT): 498 MS
EKG R AXIS: 4 DEGREES
EKG T AXIS: 114 DEGREES
EKG VENTRICULAR RATE: 60 BPM
ESTIMATED AVERAGE GLUCOSE: 114 MG/DL
HBA1C MFR BLD: 5.6 %
HCT VFR BLD CALC: 37.5 % (ref 40.5–52.5)
HDLC SERPL-MCNC: 41 MG/DL (ref 40–60)
HEMOGLOBIN: 13 G/DL (ref 13.5–17.5)
LDL CHOLESTEROL CALCULATED: 61 MG/DL
MCH RBC QN AUTO: 32.1 PG (ref 26–34)
MCHC RBC AUTO-ENTMCNC: 34.7 G/DL (ref 31–36)
MCV RBC AUTO: 92.5 FL (ref 80–100)
PDW BLD-RTO: 13.8 % (ref 12.4–15.4)
PLATELET # BLD: 131 K/UL (ref 135–450)
PMV BLD AUTO: 8.4 FL (ref 5–10.5)
RBC # BLD: 4.06 M/UL (ref 4.2–5.9)
TRIGL SERPL-MCNC: 96 MG/DL (ref 0–150)
VLDLC SERPL CALC-MCNC: 19 MG/DL
WBC # BLD: 4.8 K/UL (ref 4–11)

## 2022-05-27 PROCEDURE — 93010 ELECTROCARDIOGRAM REPORT: CPT | Performed by: INTERNAL MEDICINE

## 2022-05-27 PROCEDURE — 70551 MRI BRAIN STEM W/O DYE: CPT

## 2022-05-27 PROCEDURE — 80061 LIPID PANEL: CPT

## 2022-05-27 PROCEDURE — 2060000000 HC ICU INTERMEDIATE R&B

## 2022-05-27 PROCEDURE — 85027 COMPLETE CBC AUTOMATED: CPT

## 2022-05-27 PROCEDURE — 83036 HEMOGLOBIN GLYCOSYLATED A1C: CPT

## 2022-05-27 PROCEDURE — 6370000000 HC RX 637 (ALT 250 FOR IP): Performed by: NURSE PRACTITIONER

## 2022-05-27 PROCEDURE — 36415 COLL VENOUS BLD VENIPUNCTURE: CPT

## 2022-05-27 PROCEDURE — 94760 N-INVAS EAR/PLS OXIMETRY 1: CPT

## 2022-05-27 RX ADMIN — APIXABAN 5 MG: 5 TABLET, FILM COATED ORAL at 09:11

## 2022-05-27 RX ADMIN — FLUTICASONE PROPIONATE 2 SPRAY: 50 SPRAY, METERED NASAL at 09:12

## 2022-05-27 RX ADMIN — AMLODIPINE BESYLATE 2.5 MG: 5 TABLET ORAL at 09:10

## 2022-05-27 RX ADMIN — ASPIRIN 81 MG: 81 TABLET, COATED ORAL at 09:11

## 2022-05-27 RX ADMIN — LISINOPRIL 40 MG: 40 TABLET ORAL at 09:10

## 2022-05-27 ASSESSMENT — PAIN SCALES - WONG BAKER
WONGBAKER_NUMERICALRESPONSE: 0

## 2022-05-27 NOTE — DISCHARGE SUMMARY
Hospital Medicine Discharge Summary    Patient ID: Priscila Carnes      Patient's PCP: Dejon Valle MD    Admit Date: 5/25/2022     Discharge Date:   5/27/2022    Admitting Physician: Nasim Qureshi MD     Discharge Physician: Giuseppe Arellano MD     Discharge Diagnoses: Active Hospital Problems    Diagnosis Date Noted    Vertigo [R42] 05/27/2022     Priority: Medium    Peripheral vertigo [H81.399] 05/26/2022     Priority: Medium    Essential hypertension [I10] 11/12/2020    Pacemaker [Z95.0] 06/22/2018    Atrial fibrillation with slow ventricular response ([facility] [address] [I48.91] 11/28/2017    CAD (coronary artery disease) [I25.10] 09/21/2011       The patient was seen and examined on day of discharge and this discharge summary is in conjunction with any daily progress note from day of discharge. Hospital Course: 67 y.o. male with PMHx of A-fib, CAD, GERD, HLD and HTN presented to [facility] with episode of dizziness began about 3:30 PM and is intermittent. He notices the dizziness mostly with standing. But seems to be positional.  He does have history of vertigo. He has been dealing with tinnitus for quite some time. Patient denies extremity weakness. No numbness or tingling. No headache. No difficulties with speech or swallowing. He denies any known prior history of stroke. Peripheral vertigo  - with symptoms: dizziness  - admit to OBS to RO TIA/CVA  - head CT neg for acute pathology, remote left PCA distribution stroke with resultant encephalomalacia. Chronic microvascular disease.  - CTA head/neck: No large vessel occlusion or hemodynamic stenosis. There is 50% narrowing identified right proximal ICA.   - MRI Brain without contrast showed no acute intracranial abnormality  - ASA, statin -> no need for ASA at discharge  - consult neurology -> no further workup needed  - check lipids, HBA1c  - allow permissive HTN, SBP < 220, DBP < 110  - vestibular rehab referral placed at discharge     CAD s/p SAHARA LAD 2008  - PPM implant 6/20/18 (symptomatic bradycardia)  - continue statin  - stable     Atrial Fibrillation   - currently rate controlled  - continue eliquis      Essential (primary) hypertension   - monitor blood pressure  - continue home meds      Hyperlipidemia   - continue statin      Exam:     BP (!) 163/82   Pulse 60   Temp 98 °F (36.7 °C) (Oral)   Resp 18   Ht 6' 2\" (1.88 m)   Wt 207 lb 14.3 oz (94.3 kg)   SpO2 98%   BMI 26.69 kg/m²       General appearance:  No apparent distress, appears stated age and cooperative. HEENT:  Normal cephalic, atraumatic without obvious deformity. Pupils equal, round, and reactive to light. Extra ocular muscles intact. Conjunctivae/corneas clear. Neck: Supple, with full range of motion. No jugular venous distention. Trachea midline. Respiratory:  Normal respiratory effort. Clear to auscultation, bilaterally without Rales/Wheezes/Rhonchi. Cardiovascular:  Regular rate and rhythm with normal S1/S2 without murmurs, rubs or gallops. Abdomen: Soft, non-tender, non-distended with normal bowel sounds. Musculoskeletal:  No clubbing, cyanosis or edema bilaterally. Full range of motion without deformity. Skin: Skin color, texture, turgor normal.  No rashes or lesions. Neurologic:  Neurovascularly intact without any focal sensory/motor deficits. Cranial nerves: II-XII intact, grossly non-focal.  Psychiatric:  Alert and oriented, thought content appropriate, normal insight  Capillary Refill: Brisk,< 3 seconds   Peripheral Pulses: +2 palpable, equal bilaterally       Labs:  For convenience and continuity at follow-up the following most recent labs are provided:      CBC:    Lab Results   Component Value Date    WBC 4.8 05/27/2022    HGB 13.0 05/27/2022    HCT 37.5 05/27/2022     05/27/2022       Renal:    Lab Results   Component Value Date     05/26/2022    K 3.9 05/26/2022     05/26/2022    CO2 22 05/26/2022    BUN 11 05/26/2022    CREATININE 1.1 05/26/2022    CALCIUM 9.5 05/26/2022    PHOS 3.0 02/06/2019         Significant Diagnostic Studies    Radiology:   MRI brain without contrast   Final Result   No acute infarct or other acute intracranial process. XR CHEST PORTABLE   Final Result   No acute abnormality. CTA HEAD NECK W CONTRAST   Final Result   Impression:      Negative for large vessel occlusion or hemodynamic stenosis. There is 50% narrowing identified right proximal ICA per NASCET criteria. CT HEAD WO CONTRAST   Final Result   No acute intracranial abnormality. 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 Chronic   microvascular disease      The findings were sent to the Radiology Results [address] at 11:19   pm on 5/25/2022 to be communicated to a licensed caregiver.                 Consults:     IP CONSULT TO STROKE TEAM  IP CONSULT TO PHARMACY  PHARMACY TO CHANGE BASE FLUIDS  IP CONSULT TO NEUROLOGY  IP CONSULT TO SPIRITUAL SERVICES    Disposition:  home     Condition at Discharge: Stable    Discharge Instructions/Follow-up:  meds as prescribed, vestibular rehab referral placed, patient can follow with ENT if symptoms persist or recur    Code Status:  Full Code     Activity: activity as tolerated    Diet: regular diet      Discharge Medications:     Current Discharge Medication List           Details   atorvastatin (LIPITOR) 40 MG tablet TAKE 1 TABLET BY MOUTH AT  NIGHT  Qty: 90 tablet, Refills: 3    Comments: Requesting 1 year supply      lisinopril (PRINIVIL;ZESTRIL) 40 MG tablet TAKE 1 TABLET BY MOUTH  DAILY  Qty: 90 tablet, Refills: 3    Comments: Requesting 1 year supply      amLODIPine (NORVASC) 2.5 MG tablet Take 1 tablet by mouth daily  Qty: 30 tablet, Refills: 5    Associated Diagnoses: Benign hypertension      apixaban (ELIQUIS) 5 MG TABS tablet TAKE 1 TABLET BY MOUTH  TWICE DAILY  Qty: 180 tablet, Refills: 3    Comments: Requesting 1 year supply      diphenhydrAMINE-Zinc Acetate (BENADRYL ITCH RELIEF EX) Apply topically as needed    Associated Diagnoses: Rash and nonspecific skin eruption      fluticasone (FLONASE) 50 MCG/ACT nasal spray 2 sprays by Each Nostril route daily  Qty: 3 Bottle, Refills: 1    Associated Diagnoses: Chronic rhinitis      clobetasol (TEMOVATE) 0.05 % cream Apply topically 2 times daily. Qty: 60 g, Refills: 0    Associated Diagnoses: Rash and nonspecific skin eruption      saw palmetto 160 MG capsule Take 160 mg by mouth daily. nitroGLYCERIN (NITROSTAT) 0.4 MG SL tablet Place 0.4 mg under the tongue every 5 minutes as needed. Glucosamine-Chondroit-Vit C-Mn (GLUCOSAMINE CHONDROITIN COMPLX) TABS Take 1 tablet by mouth daily. Time Spent on discharge is more than 30 minutes in the examination, evaluation, counseling and review of medications and discharge plan. Signed:    Jose Khan MD   5/27/2022      Thank you Allan Sandoval MD for the opportunity to be involved in this patient's care. If you have any questions or concerns please feel free to contact me at [phone].

## 2022-05-27 NOTE — CARE COORDINATION
05/27/22 1230   IMM Letter   IMM Letter given to Patient/Family/Significant other/Guardian/POA/by: initial letter provided to/explained by Ruiz Diallo RN. All questions answered at this time.    IMM Letter date given: 05/27/22   IMM Letter time given: 65   Electronically signed by Tigist Oro RN on 5/27/2022 at 12:31 PM

## 2022-05-27 NOTE — PROGRESS NOTES
Physician Progress Note      Brigida Ocasio  CSN #:                  [id number]  :                       1945  ADMIT DATE:       2022 10:27 PM  [facility] DATE:  RESPONDING  PROVIDER #:        Caitlin Rosas MD          QUERY TEXT:    Patient admitted with dizziness, noted to have atrial fibrillation. If   possible, please document in progress notes and discharge summary further   specificity regarding the type of atrial fibrillation: The medical record reflects the following:  Risk Factors: A fib  Clinical Indicators: per H&P \"Atrial Fibrillation  - currently rate controlled    - continue eliquis \"  Treatment: Eliquis and telemetry monitoring    Chronic: nonspecific term that could be referring to paroxysmal, persistent,   or permanent  Longstanding persistent: persistent and continuous, lasting > 1 year. Paroxysmal - self-terminating or intermittent; resolves with or without   intervention within 7 days of onset; may recur with various frequency. Persistent - Fails to terminate within 7 days; Often requires meds or   cardioversion to restore to NSR. Permanent - longstanding & persistent; Medication has been ineffective in   restoring NSR &/or cardioversion is contraindicated    Definitions per MS-DRG Training Guide and Quick Reference Guide, Rashawn Jaymar 112 5   Diseases and Disorders of the Circulatory System; 2019; [employer]. Software content   from the [employer]? Advanced CDI Transformation Program  Options provided:  -- Paroxysmal Atrial Fibrillation  -- Longstanding Persistent Atrial Fibrillation  -- Permanent Atrial Fibrillation  -- Persistent Atrial Fibrillation  -- Chronic Atrial Fibrillation, unspecified  -- Other - I will add my own diagnosis  -- Disagree - Not applicable / Not valid  -- Disagree - Clinically unable to determine / Unknown  -- Refer to Clinical Documentation Reviewer    PROVIDER RESPONSE TEXT:    This patient has paroxysmal atrial fibrillation.     Query created by: Erven Jeans, Tal Avelar on 5/27/2022 2:54 PM      Electronically signed by:  Rosaura Metcalf MD 5/27/2022 3:52 PM

## 2022-05-27 NOTE — PROGRESS NOTES
Speech Language Pathology      Speech Therapy note: 5/27/2022 at 1626 pm    · Chart reviewed and d/c orders placed by MD. MRI Brain today with documented findings of \"no acute infarct or other acute intracranial process\". · Chart review indicates RN has given discharge instructions to pt/family and awaiting transport to take to lobby. Recommend:   Otherwise discharge status is per 5/26/2022 SLP/RAÚL assessment. If additional SLP at d/c is desired by pt/family/MD;will need orders. Thank you    Signed  Christian Ramey,MS,CCC,SLP [id number]  Speech and Language Pathologist  5/27/2022 at 1625 pm

## 2022-05-27 NOTE — CARE COORDINATION
Advance Care Planning     Advance Care Planning Activator (Inpatient)  Conversation Note      Date of ACP Conversation: 5/27/2022     Conversation Conducted with: Patient with Decision Making Capacity    ACP Activator: Jacquelin Rodríguez RN    Health Care Decision Maker:     Current Designated Health Care Decision Maker:     Primary Decision Maker: Flower Oropeza Spouse - [phone]     Care Preferences    Ventilation: \"If you were in your present state of health and suddenly became very ill and were unable to breathe on your own, what would your preference be about the use of a ventilator (breathing machine) if it were available to you? \"      Would the patient desire the use of ventilator (breathing machine)?: yes    \"If your health worsens and it becomes clear that your chance of recovery is unlikely, what would your preference be about the use of a ventilator (breathing machine) if it were available to you? \"     Would the patient desire the use of ventilator (breathing machine)?: No      Resuscitation  \"CPR works best to restart the heart when there is a sudden event, like a heart attack, in someone who is otherwise healthy. Unfortunately, CPR does not typically restart the heart for people who have serious health conditions or who are very sick. \"    \"In the event your heart stopped as a result of an underlying serious health condition, would you want attempts to be made to restart your heart (answer \"yes\" for attempt to resuscitate) or would you prefer a natural death (answer \"no\" for do not attempt to resuscitate)? \" yes       [] Yes   [] No   Educated Patient / [place] regarding differences between Advance Directives and portable DNR orders.     Length of ACP Conversation in minutes:  5    Conversation Outcomes:  [x] ACP discussion completed  [] Existing advance directive reviewed with patient; no changes to patient's previously recorded wishes  [] New Advance Directive completed  [] Portable Do Not Rescitate prepared for Provider review and signature  [] POLST/POST/MOLST/MOST prepared for Provider review and signature      Follow-up plan:    [] Schedule follow-up conversation to continue planning  [] Referred individual to Provider for additional questions/concerns   [] Advised patient/agent/surrogate to review completed ACP document and update if needed with changes in condition, patient preferences or care setting    [x] This note routed to one or more involved healthcare providers    Electronically signed by Katelin Chambers RN on 5/27/2022 at 12:38 PM

## 2022-05-27 NOTE — CARE COORDINATION
05/27/22 1233   Service Assessment   Patient Orientation Alert and Oriented   Cognition Alert   History Provided By Patient   Primary Caregiver Self   PCP Verified by CM Yes   Prior Functional Level Independent in ADLs/IADLs   Current Functional Level Independent in ADLs/IADLs   Can patient return to prior living arrangement Yes   Ability to make needs known: Good   Family able to assist with home care needs: Yes   Financial Resources Medicare   Social/Functional History   Receives Help From [facility] Discharge   [address] Provided? No   Mode of Transport at Discharge Other (see comment)  (wife to transport)   Confirm Follow Up Transport Family   Patient to discharge home with spouse when medically ready. No needs identified at this time. Will continue to follow for any updated therapy recommendations.     Electronically signed by Soledad Alvarez RN on 5/27/2022 at 12:36 PM

## 2022-05-27 NOTE — PROGRESS NOTES
Data- discharge order received, pt verbalized agreement to discharge, disposition to previous residence, no needs for HHC/DME. Action- discharge instructions prepared and given to PATIENT AND SON, pt verbalized understanding. Medication information packet given r/t NEW and/or CHANGED prescriptions emphasizing name/purpose/side effects, pt verbalized understanding. Discharge instruction summary: Diet- AS TOLERATED, Activity- AS TOLERATED, Primary Care Physician as follows: Kristel Child -[phone] f/u appointment TO BE MADE, immunizations reviewed and UP TO DATE, prescription medications filled NO CHANGES MADE TO MEDICATIONS. Response- Pt belongings gathered, IV removed.  Disposition is home (no HHC/DME needs), REQUEST PLACED FOR WHEELCHAIR

## 2022-05-31 ENCOUNTER — TELEPHONE (OUTPATIENT)
Dept: FAMILY MEDICINE CLINIC | Age: 77
End: 2022-05-31

## 2022-05-31 ENCOUNTER — CARE COORDINATION (OUTPATIENT)
Dept: CASE MANAGEMENT | Age: 77
End: 2022-05-31

## 2022-05-31 DIAGNOSIS — R42 VERTIGO: Primary | ICD-10-CM

## 2022-05-31 PROCEDURE — 1111F DSCHRG MED/CURRENT MED MERGE: CPT

## 2022-05-31 RX ORDER — OMEGA-3S/DHA/EPA/FISH OIL/D3 300MG-1000
400 CAPSULE ORAL DAILY
COMMUNITY

## 2022-05-31 RX ORDER — MULTIVIT WITH MINERALS/LUTEIN
250 TABLET ORAL DAILY
COMMUNITY

## 2022-05-31 RX ORDER — M-VIT,TX,IRON,MINS/CALC/FOLIC 27MG-0.4MG
1 TABLET ORAL DAILY
COMMUNITY

## 2022-05-31 NOTE — TELEPHONE ENCOUNTER
[facility] Transitions Initial Follow Up Call    Outreach made within 2 business days of discharge: Yes    Patient: Cristin Chase Patient : 1945   MRN: [id number]  Reason for Admission: There are no discharge diagnoses documented for the most recent discharge. Discharge Date: 22       Spoke with: Mrs Sunny Rice    Discharge department/facility:[facility] Interactive Patient Contact:  Was patient able to fill all prescriptions: yes  Was patient instructed to bring all medications to the follow-up visit: Yes  Is patient taking all medications as directed in the discharge summary?  Yes  Does patient understand their discharge instructions: Yes  Does patient have questions or concerns that need addressed prior to 7-14 day follow up office visit: no    Scheduled appointment with PCP within 7-14 days    Follow Up  Future Appointments   Date Time Provider Tereza Leavitt   2022 12:00 PM Vira Rai, [address] [place] [address]   2022  1:15 PM SCHEDULE, [facility] REMOTE TRANSMISSION [facility]       Neha Shaffer MA

## 2022-05-31 NOTE — CARE COORDINATION
[facility] Transitions Initial Follow Up Call    Call within 2 business days of discharge: Yes    Patient: Jessica Wang Patient : 1945   MRN: [id number]  Reason for Admission: Vertigo  Discharge Date: 22 RARS: Readmission Risk Score: 11.8 ( )      Last Discharge [facility]       Complaint Diagnosis Description Type Department Provider    22 Dizziness Vertigo . .. ED to Hosp-Admission (Discharged) (ADMITTED) Earnest Salter MD; Wilder Nguyen. .. Transitions of Care Initial Call    Was this an external facility discharge? No Discharge Facility: N/A    Challenges to be reviewed by the provider   Additional needs identified to be addressed with provider: No               Method of communication with provider : none    Advance Care Planning:   Advance Care Planning   Healthcare Decision Maker:    Primary Decision Maker: Cassie Barker Spouse - [phone]    Deisi Serrano states Nestor Flanagan does not have a Living Will or Healthcare POA. She states his son is working on that for him. Care Transition Nurse contacted the family by telephone to perform post hospital discharge assessment. Verified name and  with family as identifiers. Provided introduction to self, and explanation of the CTN role. CTN reviewed discharge instructions, medical action plan and red flags with family who verbalized understanding. Family given an opportunity to ask questions and does not have any further questions or concerns at this time. Were discharge instructions available to patient? Yes. Reviewed appropriate site of care based on symptoms and resources available to patient including: PCP  When to call 911. The family agrees to contact the PCP office for questions related to their healthcare. Medication reconciliation was performed with family, who verbalizes understanding of administration of home medications. Advised obtaining a 90-day supply of all daily and as-needed medications.      Was patient discharged with a pulse oximeter? no    CTN provided contact information. Plan for next call: dizziness        Non-face-to-face services provided:  Obtained and reviewed discharge summary and/or continuity of care documents  Assessment and support for treatment adherence and medication management-medication list reviewed & 1111f completed. Care Transitions 24 Hour Call    Do you have a copy of your discharge instructions?: Yes  Do you have all of your prescriptions and are they filled?: Yes  Have you been contacted by a [facility]?: No  Have you scheduled your follow up appointment?: No  Do you have support at home?: Partner/Spouse/SO  Do you feel like you have everything you need to keep you well at home?: Yes  Are you an active caregiver in your home?: No  Care Transitions Interventions       Initial attempt at CT discharge phone call. Spoke with Simona Glass - wife (HIPPA verified). She states Kennon Brittle is extremely [place]. She asked Kennon Brittle all the questions writer asked her. Erikcarlos Quan states Kennon Brittle is [place] - a little weak\". Simona Glass states they do not monitor [employer] B/P at home - they do not have a monitor. She denies Kennon Brittle having any dizziness or vision issues at this time. Writer attempted to assist in scheduling a hospital follow up appointment - was unable to do so. No available appointments for the next week. Simona Glass states she will contact the office. Writer will also send a staff message. Simona Glass states she will provide [employer] transportation. Simona Glass denies any needs for Kennon Brittle at this time.     Follow Up  Future Appointments   Date Time Provider Tereza Leavitt   8/31/2022  1:15 PM SCHEDULE, [facility] REMOTE TRANSMISSION [facility]       Kandace Ragland RN BSN  Care Transition Nurse  [phone]

## 2022-05-31 NOTE — TELEPHONE ENCOUNTER
----- Message from Josh Gordon sent at 5/31/2022 11:50 AM EDT -----  Subject: Message to Provider    QUESTIONS  Information for Provider? Pt was discharged from [facility] on 5.27.22. He   was admitted for vertigo and dizziness. Pt preferred to book f/u with Dr. Lucia Grey which next available was 6.13.22. The appt was booked but please   advise it was not within the 7 day recommendation for a hospital follow   up. Please return call to pt to change appt for advise if current appt   will suffice,   ---------------------------------------------------------------------------  --------------  CALL BACK INFO  What is the best way for the office to contact you? OK to leave message on   voicemail  Preferred Call Back Phone Number? [phone]  ---------------------------------------------------------------------------  --------------  SCRIPT ANSWERS  Relationship to Patient? Other  Representative Name? Jayashree Vazquez   Is the Representative on the appropriate HIPAA document in Epic?  Yes

## 2022-06-01 NOTE — TELEPHONE ENCOUNTER
I was taking PTO at this time. Discussed with BRANDT Jackson and found that this has been take care of since then.

## 2022-06-06 ENCOUNTER — CARE COORDINATION (OUTPATIENT)
Dept: CASE MANAGEMENT | Age: 77
End: 2022-06-06

## 2022-06-06 NOTE — CARE COORDINATION
[facility] Transitions Follow Up Call    2022    Patient: Cristin Chase  Patient : 1945   MRN: [id number]  Reason for Admission: Vertigo  Discharge Date: 22 RARS: Readmission Risk Score: 11.8 ( )         Care Transitions Follow Up Call    Needs to be reviewed by the provider   Additional needs identified to be addressed with provider: No               Method of communication with provider : none      Care Transition Nurse contacted the family by telephone to follow up after admission on 2022. Verified name and  with family as identifiers. CTN provided contact information for future needs. Plan for next call: dizziness & right visual issues          Care Transitions Subsequent and Final Call    Subsequent and Final Calls  Do you have any ongoing symptoms?: No  Have your medications changed?: No  Do you have any questions related to your medications?: No  Do you currently have any active services?: No  Do you have any needs or concerns that I can assist you with?: No  Identified Barriers: None  Care Transitions Interventions  Other Interventions: Teresa Piedramauricio states Belen Mayes is doing \"real good\". She denies Belen Mayes having any c/o dizziness or visual issues at this time. Teresa Wagner states Belen Mayes will be seeing his PCP on 2022. She denies any needs for Belen Mayes at this time.     Follow Up  Future Appointments   Date Time Provider Tereza Leavitt   2022 12:00 PM Vira Rai MD [facility]   2022  1:15 PM SCHEDULE, [facility] REMOTE [facility]       Wiliam Moore RN BSN  Care Transition Nurse  [phone]

## 2022-06-06 NOTE — PROGRESS NOTES
Physician Progress Note      PATIENTCocha Coffey  CSN #:                  [id number]  :                       1945  ADMIT DATE:       2022 10:27 PM  Ty Mckeon DATE:        2022 4:28 PM  RESPONDING  PROVIDER #:        Edgardo Suarez MD          QUERY TEXT:    Pt admitted with Dizziness. Pt noted to have prior CVA. If possible, please   document in progress notes and discharge summary if you are evaluating and/or   treating any of the following: The medical record reflects the following:  Risk Factors: Previous CVA   afib  Clinical Indicators: MRI \"Remote left PCA territory cortical infarct involving   the left medial occipital lobe\"  per Neurology consult \"with afib on apixaban   and vascular risk factors presents with two discrete and identical episodes   of transient vertigo and incidental finding of chronic stroke in left PCA   territory\"  Treatment: CT, CTA, MRI, Neurology consult  Options provided:  -- Dizziness  possibly  as a sequela of prior CVA  -- Dizziness is not a sequela of prior CVA  -- Other - I will add my own diagnosis  -- Disagree - Not applicable / Not valid  -- Disagree - Clinically unable to determine / Unknown  -- Refer to Clinical Documentation Reviewer    PROVIDER RESPONSE TEXT:    Dizziness is not a sequela of prior CVA.     Query created by: Micah Kennedy on 2022 2:36 PM      Electronically signed by:  Edgardo Suarez MD 2022 8:19 AM

## 2022-06-08 ENCOUNTER — OFFICE VISIT (OUTPATIENT)
Dept: FAMILY MEDICINE CLINIC | Age: 77
End: 2022-06-08
Payer: MEDICARE

## 2022-06-08 VITALS
OXYGEN SATURATION: 98 % | WEIGHT: 210 LBS | SYSTOLIC BLOOD PRESSURE: 136 MMHG | TEMPERATURE: 97.9 F | BODY MASS INDEX: 26.95 KG/M2 | HEART RATE: 66 BPM | HEIGHT: 74 IN | DIASTOLIC BLOOD PRESSURE: 74 MMHG

## 2022-06-08 DIAGNOSIS — I10 ESSENTIAL HYPERTENSION: ICD-10-CM

## 2022-06-08 DIAGNOSIS — E78.00 HYPERCHOLESTEROLEMIA: ICD-10-CM

## 2022-06-08 DIAGNOSIS — R42 VERTIGO: Primary | ICD-10-CM

## 2022-06-08 DIAGNOSIS — Z09 HOSPITAL DISCHARGE FOLLOW-UP: ICD-10-CM

## 2022-06-08 DIAGNOSIS — I48.20 CHRONIC ATRIAL FIBRILLATION (HCC): ICD-10-CM

## 2022-06-08 PROCEDURE — 1111F DSCHRG MED/CURRENT MED MERGE: CPT | Performed by: INTERNAL MEDICINE

## 2022-06-08 PROCEDURE — 99495 TRANSJ CARE MGMT MOD F2F 14D: CPT | Performed by: INTERNAL MEDICINE

## 2022-06-08 ASSESSMENT — PATIENT HEALTH QUESTIONNAIRE - PHQ9
3. TROUBLE FALLING OR STAYING ASLEEP: 0
SUM OF ALL RESPONSES TO PHQ QUESTIONS 1-9: 0
SUM OF ALL RESPONSES TO PHQ9 QUESTIONS 1 & 2: 0
SUM OF ALL RESPONSES TO PHQ QUESTIONS 1-9: 0
9. THOUGHTS THAT YOU WOULD BE BETTER OFF DEAD, OR OF HURTING YOURSELF: 0
6. FEELING BAD ABOUT YOURSELF - OR THAT YOU ARE A FAILURE OR HAVE LET YOURSELF OR YOUR FAMILY DOWN: 0
SUM OF ALL RESPONSES TO PHQ QUESTIONS 1-9: 0
8. MOVING OR SPEAKING SO SLOWLY THAT OTHER PEOPLE COULD HAVE NOTICED. OR THE OPPOSITE, BEING SO FIGETY OR RESTLESS THAT YOU HAVE BEEN MOVING AROUND A LOT MORE THAN USUAL: 0
SUM OF ALL RESPONSES TO PHQ QUESTIONS 1-9: 0
7. TROUBLE CONCENTRATING ON THINGS, SUCH AS READING THE NEWSPAPER OR WATCHING TELEVISION: 0
2. FEELING DOWN, DEPRESSED OR HOPELESS: 0
4. FEELING TIRED OR HAVING LITTLE ENERGY: 0
5. POOR APPETITE OR OVEREATING: 0
10. IF YOU CHECKED OFF ANY PROBLEMS, HOW DIFFICULT HAVE THESE PROBLEMS MADE IT FOR YOU TO DO YOUR WORK, TAKE CARE OF THINGS AT HOME, OR GET ALONG WITH OTHER PEOPLE: 0
1. LITTLE INTEREST OR PLEASURE IN DOING THINGS: 0

## 2022-06-08 NOTE — PROGRESS NOTES
SUBJECTIVE:  Breann Speaker is a 67 y.o. male being evaluated for:    Chief Complaint   Patient presents with    Follow-Up from Hospital      Patient was in the hospital on 05/25/2022 . HPI   Presented with dizziness and nausea and vomiting  Spinning  Kept recurring    NO speech problems  Numbness tingling or weakness or vision problems No further episodes since hospital     Has bad sinus  Using nasal spray and it is  Doing better      Allergies   Allergen Reactions    Bee Venom Anaphylaxis     Current Outpatient Medications   Medication Sig Dispense Refill    Ascorbic Acid (VITAMIN C) 250 MG tablet Take 250 mg by mouth daily      vitamin D3 (CHOLECALCIFEROL) 10 MCG (400 UNIT) TABS tablet Take 400 Units by mouth daily      Multiple Vitamins-Minerals (THERAPEUTIC MULTIVITAMIN-MINERALS) tablet Take 1 tablet by mouth daily      atorvastatin (LIPITOR) 40 MG tablet TAKE 1 TABLET BY MOUTH AT  NIGHT 90 tablet 3    lisinopril (PRINIVIL;ZESTRIL) 40 MG tablet TAKE 1 TABLET BY MOUTH  DAILY 90 tablet 3    amLODIPine (NORVASC) 2.5 MG tablet Take 1 tablet by mouth daily 30 tablet 5    apixaban (ELIQUIS) 5 MG TABS tablet TAKE 1 TABLET BY MOUTH  TWICE DAILY 180 tablet 3    diphenhydrAMINE-Zinc Acetate (BENADRYL ITCH RELIEF EX) Apply topically as needed      fluticasone (FLONASE) 50 MCG/ACT nasal spray 2 sprays by Each Nostril route daily 3 Bottle 1    clobetasol (TEMOVATE) 0.05 % cream Apply topically 2 times daily. (Patient taking differently: 2 times daily as needed Apply topically 2 times daily.) 60 g 0    saw palmetto 160 MG capsule Take 160 mg by mouth daily        nitroGLYCERIN (NITROSTAT) 0.4 MG SL tablet Place 0.4 mg under the tongue every 5 minutes as needed.  Glucosamine-Chondroit-Vit C-Mn (GLUCOSAMINE CHONDROITIN COMPLX) TABS Take 1 tablet by mouth daily. No current facility-administered medications for this visit.          Social History     Socioeconomic History    Marital status:      Spouse name: Not on file    Number of children: Not on file    Years of education: Not on file    Highest education level: Not on file   Occupational History    Not on file   Tobacco Use    Smoking status: Former Smoker     Packs/day: 0.50     Years: 7.00     Pack years: 3.50     Types: Cigarettes     Quit date: 1970     Years since quittin.4    Smokeless tobacco: Never Used   Vaping Use    Vaping Use: Never used   Substance and Sexual Activity    Alcohol use: No    Drug use: No    Sexual activity: Yes     Partners: Female   Other Topics Concern    Not on file   Social History Narrative    Not on file     Social Determinants of Health     Financial Resource Strain: Low Risk     Difficulty of Paying Living Expenses: Not hard at all   Food Insecurity: No Food Insecurity    Worried About [address] in the Last Year: Never true    [address] [place] [employer] in the Last Year: Never true   Transportation Needs: No Transportation Needs    Lack of Transportation (Medical): No    Lack of Transportation (Non-Medical):  No   Physical Activity:     Days of Exercise per Week: Not on file    Minutes of Exercise per Session: Not on file   Stress:     Feeling of Stress : Not on file   Social Connections:     Frequency of Communication with Friends and Family: Not on file    Frequency of Social Gatherings with Friends and Family: Not on file    Attends [employer] Services: Not on file    Active Member of Clubs or Organizations: Not on file    Attends Club or Organization Meetings: Not on file    Marital Status: Not on file   Intimate Partner Violence:     Fear of Current or Ex-Partner: Not on file    Emotionally Abused: Not on file    Physically Abused: Not on file    Sexually Abused: Not on file   Housing Stability:     Unable to Pay for Housing in the Last Year: Not on file    Number of [place] in the Last Year: Not on file    Unstable Housing in the Last Year: Not on file Past Medical History:   Diagnosis Date    Atrial fibrillation ([address]     Basal cell carcinoma     right ear and right cheek    Bilateral tinnitus 2005    Bradycardia     required pacemaker     CAD (coronary artery disease)     4 stents placed     GERD (gastroesophageal reflux disease)     Hyperlipidemia     Hypertension     Osteoarthritis      Past Surgical History:   Procedure Laterality Date    A-V CARDIAC PACEMAKER INSERTION      bradycardia     CORONARY ANGIOPLASTY WITH STENT PLACEMENT  2008    EXTERNAL EAR SURGERY Right     removed basal cell cancer    SKIN CANCER DESTRUCTION  03/2017    Right cheek       Review of Systems   Constitutional: Negative for activity change, chills and fever. HENT: Positive for congestion, hearing loss (chronic ) and sinus pressure. Negative for ear pain. Eyes: Negative for visual disturbance. Respiratory: Negative for cough and shortness of breath. Cardiovascular: Negative for chest pain and palpitations. Gastrointestinal: Negative for abdominal pain, diarrhea, nausea and vomiting. Neurological: Positive for dizziness and light-headedness. Negative for syncope, speech difficulty, weakness, numbness and headaches. OBJECTIVE:  /74 (Site: Right Upper Arm, Position: Sitting, Cuff Size: Medium Adult)   Pulse 66   Temp 97.9 °F (36.6 °C) (Oral)   Ht 6' 2\" (1.88 m)   Wt 210 lb (95.3 kg)   SpO2 98%   BMI 26.96 kg/m²      Body mass index is 26.96 kg/m². Physical Exam  Vitals and nursing note reviewed. Constitutional:       General: He is not in acute distress. Appearance: Normal appearance. He is well-developed. HENT:      Head: Normocephalic and atraumatic. Right Ear: Tympanic membrane and ear canal normal.      Left Ear: Tympanic membrane and ear canal normal.      Ears:      Comments: Hard of hearing      Nose: No congestion. Mouth/Throat:      Pharynx: Oropharynx is clear.    Eyes:      Conjunctiva/sclera: Conjunctivae normal.   Neck:      Thyroid: No thyromegaly. Vascular: No carotid bruit or JVD. Cardiovascular:      Rate and Rhythm: Normal rate and regular rhythm. Heart sounds: Normal heart sounds. No murmur heard. No friction rub. No gallop. Pulmonary:      Effort: Pulmonary effort is normal.      Breath sounds: Normal breath sounds. Chest:      Chest wall: No tenderness. Abdominal:      General: There is no distension. Palpations: Abdomen is soft. Tenderness: There is no abdominal tenderness. Comments: No HSM   Musculoskeletal:         General: No swelling. Cervical back: Neck supple. Lymphadenopathy:      Cervical: No cervical adenopathy. Skin:     General: Skin is warm and dry. Neurological:      General: No focal deficit present. Mental Status: He is alert. Cranial Nerves: No cranial nerve deficit. Gait: Gait normal.   Psychiatric:         Mood and Affect: Mood normal.         Behavior: Behavior normal.         Thought Content: Thought content normal.         ASSESSMENT/PLAN:    Harry Jenkins was seen today for follow-up from hospital.    Diagnoses and all orders for this visit:    Vertigo negative stroke symptoms  Question allergies ear also if recur ent referral     Chronic atrial fibrillation ([address] anticoag with eliquis  Rate controlled     Hypercholesterolemia good profile     Essential hypertension controlled       Moderate level of care    No orders of the defined types were placed in this encounter. Return in about 3 months (around 9/8/2022), or if symptoms worsen or fail to improve, for bp check and medicare wellness . There are no Patient Instructions on file for this visit.

## 2022-06-12 ASSESSMENT — ENCOUNTER SYMPTOMS
SINUS PRESSURE: 1
NAUSEA: 0
DIARRHEA: 0
ABDOMINAL PAIN: 0
VOMITING: 0
COUGH: 0
SHORTNESS OF BREATH: 0

## 2022-06-15 ENCOUNTER — CARE COORDINATION (OUTPATIENT)
Dept: CASE MANAGEMENT | Age: 77
End: 2022-06-15

## 2022-06-15 NOTE — CARE COORDINATION
[facility] Transitions Follow Up Call    6/15/2022    Patient: Fercho Ariza  Patient : 1945   MRN: [id number]  Reason for Admission: Vertigo, visual changes  Discharge Date: 22 RARS: Readmission Risk Score: 11.8 ( )         Spoke with: no one    Care Transitions Subsequent and Final Call    Subsequent and Final Calls  Care Transitions Interventions  Other Interventions: Follow Up  Future Appointments   Date Time Provider Tereza Leavitt   2022  1:15 PM SCHEDULE, [facility] REMOTE TRANSMISSION [facility]       Telephone was answered. CTN asked if pt was available and phone call was disconnected. CTN will attempt to call pt at a later date. Will continue to follow.      NOLBERTO Valdovinos, RN   Care Transition Nurse  Mobile: [phone]

## 2022-06-21 ENCOUNTER — CARE COORDINATION (OUTPATIENT)
Dept: CASE MANAGEMENT | Age: 77
End: 2022-06-21

## 2022-06-21 NOTE — CARE COORDINATION
[facility] Transitions Follow Up Call    2022    Patient: Cheryl Farmer  Patient : 1945   MRN: [id number]  Reason for Admission: Vertigo  Discharge Date: 22 RARS: Readmission Risk Score: 11.8 ( )         Spoke with: no one    Care Transitions Subsequent and Final Call    Subsequent and Final Calls  Care Transitions Interventions  Other Interventions:         2nd and final attempt at CT follow up phone call. Unable to reach patient. Left message. Informed Evelyn Saha this would be the final CTN outreach.     Follow Up  Future Appointments   Date Time Provider Tereza Leavitt   2022  1:15 PM SCHEDULE, [facility] REMOTE TRANSMISSION [facility] BOSTON French RN BSN  Care Transition Nurse  [phone]

## 2022-08-10 DIAGNOSIS — I10 BENIGN HYPERTENSION: ICD-10-CM

## 2022-08-10 RX ORDER — AMLODIPINE BESYLATE 2.5 MG/1
TABLET ORAL
Qty: 30 TABLET | Refills: 5 | Status: SHIPPED | OUTPATIENT
Start: 2022-08-10

## 2022-09-01 ENCOUNTER — NURSE ONLY (OUTPATIENT)
Dept: CARDIOLOGY CLINIC | Age: 77
End: 2022-09-01
Payer: MEDICARE

## 2022-09-01 DIAGNOSIS — Z95.0 PACEMAKER: Primary | ICD-10-CM

## 2022-09-01 DIAGNOSIS — R00.1 BRADYCARDIA WITH 31-40 BEATS PER MINUTE: ICD-10-CM

## 2022-09-01 PROCEDURE — 93294 REM INTERROG EVL PM/LDLS PM: CPT | Performed by: INTERNAL MEDICINE

## 2022-09-01 PROCEDURE — 93296 REM INTERROG EVL PM/IDS: CPT | Performed by: INTERNAL MEDICINE

## 2022-09-01 NOTE — PROGRESS NOTES
We received remote transmission from patient's Micra pacemaker monitor at home ([employer]). Transmission shows normal sensing and pacing function.  99.3%. Slight increasing Capture Threshold trend noted. No observations based on current interrogation. EP physician will review. See interrogation under cardiology tab in the [address] [place] [address] [address] field for more details. (End of 91-day monitoring period 9/1/22).

## 2022-09-08 ENCOUNTER — TELEPHONE (OUTPATIENT)
Dept: CARDIOLOGY CLINIC | Age: 77
End: 2022-09-08

## 2022-09-08 NOTE — TELEPHONE ENCOUNTER
Please call to notify Isela Arthur the interrogation was received and reviewed. Nothing new was documented and device functioning normally. Next remote transmission is scheduled for 12/13/22.

## 2022-09-08 NOTE — TELEPHONE ENCOUNTER
[employer]'s son Jennifer Delgadillo called in this afternoon, he states they received message saying transmission was successful on 9/1, yesterday he received a message saying it wasn't successful. He would like a phone call concerning this. He can be reached at [phone].

## 2022-09-09 NOTE — TELEPHONE ENCOUNTER
Patient was notified regarding his device pt understood and was reminded  of his next devices check Next remote transmission is scheduled for 12/13/22.

## 2022-09-15 ENCOUNTER — OFFICE VISIT (OUTPATIENT)
Dept: FAMILY MEDICINE CLINIC | Age: 77
End: 2022-09-15
Payer: MEDICARE

## 2022-09-15 VITALS
HEART RATE: 60 BPM | SYSTOLIC BLOOD PRESSURE: 139 MMHG | BODY MASS INDEX: 27.85 KG/M2 | HEIGHT: 74 IN | OXYGEN SATURATION: 98 % | DIASTOLIC BLOOD PRESSURE: 79 MMHG | WEIGHT: 217 LBS | TEMPERATURE: 98.3 F

## 2022-09-15 DIAGNOSIS — H91.93 BILATERAL HEARING LOSS, UNSPECIFIED HEARING LOSS TYPE: ICD-10-CM

## 2022-09-15 DIAGNOSIS — E78.00 HYPERCHOLESTEROLEMIA: ICD-10-CM

## 2022-09-15 DIAGNOSIS — I48.91 ATRIAL FIBRILLATION WITH SLOW VENTRICULAR RESPONSE (HCC): ICD-10-CM

## 2022-09-15 DIAGNOSIS — I25.10 CORONARY ARTERY DISEASE INVOLVING NATIVE CORONARY ARTERY OF NATIVE HEART WITHOUT ANGINA PECTORIS: ICD-10-CM

## 2022-09-15 DIAGNOSIS — Z11.59 ENCOUNTER FOR HEPATITIS C SCREENING TEST FOR LOW RISK PATIENT: ICD-10-CM

## 2022-09-15 DIAGNOSIS — Z00.00 MEDICARE ANNUAL WELLNESS VISIT, SUBSEQUENT: Primary | ICD-10-CM

## 2022-09-15 DIAGNOSIS — R73.03 PRE-DIABETES: ICD-10-CM

## 2022-09-15 DIAGNOSIS — I10 ESSENTIAL HYPERTENSION: ICD-10-CM

## 2022-09-15 DIAGNOSIS — Z23 NEED FOR IMMUNIZATION AGAINST INFLUENZA: ICD-10-CM

## 2022-09-15 PROCEDURE — 90694 VACC AIIV4 NO PRSRV 0.5ML IM: CPT | Performed by: INTERNAL MEDICINE

## 2022-09-15 PROCEDURE — G0439 PPPS, SUBSEQ VISIT: HCPCS | Performed by: INTERNAL MEDICINE

## 2022-09-15 PROCEDURE — 1123F ACP DISCUSS/DSCN MKR DOCD: CPT | Performed by: INTERNAL MEDICINE

## 2022-09-15 PROCEDURE — G0008 ADMIN INFLUENZA VIRUS VAC: HCPCS | Performed by: INTERNAL MEDICINE

## 2022-09-15 RX ORDER — EPINEPHRINE 0.15 MG/.3ML
INJECTION INTRAMUSCULAR
Qty: 2 EACH | Refills: 0 | Status: SHIPPED | OUTPATIENT
Start: 2022-09-15

## 2022-09-15 ASSESSMENT — PATIENT HEALTH QUESTIONNAIRE - PHQ9
8. MOVING OR SPEAKING SO SLOWLY THAT OTHER PEOPLE COULD HAVE NOTICED. OR THE OPPOSITE, BEING SO FIGETY OR RESTLESS THAT YOU HAVE BEEN MOVING AROUND A LOT MORE THAN USUAL: 0
6. FEELING BAD ABOUT YOURSELF - OR THAT YOU ARE A FAILURE OR HAVE LET YOURSELF OR YOUR FAMILY DOWN: 0
5. POOR APPETITE OR OVEREATING: 0
SUM OF ALL RESPONSES TO PHQ QUESTIONS 1-9: 0
7. TROUBLE CONCENTRATING ON THINGS, SUCH AS READING THE NEWSPAPER OR WATCHING TELEVISION: 0
9. THOUGHTS THAT YOU WOULD BE BETTER OFF DEAD, OR OF HURTING YOURSELF: 0
SUM OF ALL RESPONSES TO PHQ QUESTIONS 1-9: 0
SUM OF ALL RESPONSES TO PHQ9 QUESTIONS 1 & 2: 0
10. IF YOU CHECKED OFF ANY PROBLEMS, HOW DIFFICULT HAVE THESE PROBLEMS MADE IT FOR YOU TO DO YOUR WORK, TAKE CARE OF THINGS AT HOME, OR GET ALONG WITH OTHER PEOPLE: 0
4. FEELING TIRED OR HAVING LITTLE ENERGY: 0
SUM OF ALL RESPONSES TO PHQ QUESTIONS 1-9: 0
1. LITTLE INTEREST OR PLEASURE IN DOING THINGS: 0
SUM OF ALL RESPONSES TO PHQ QUESTIONS 1-9: 0
3. TROUBLE FALLING OR STAYING ASLEEP: 0
2. FEELING DOWN, DEPRESSED OR HOPELESS: 0

## 2022-09-15 ASSESSMENT — ENCOUNTER SYMPTOMS
COUGH: 0
NAUSEA: 0
TROUBLE SWALLOWING: 0
BLOOD IN STOOL: 0
SHORTNESS OF BREATH: 0
CONSTIPATION: 0
SINUS PRESSURE: 0
ROS SKIN COMMENTS: NO CONCERNING SKIN LESIONS
ABDOMINAL PAIN: 0
DIARRHEA: 0
VOMITING: 0

## 2022-09-15 NOTE — PATIENT INSTRUCTIONS
Personalized Preventive Plan for Alka lAeman - 9/15/2022  Medicare offers a range of preventive health benefits. Some of the tests and screenings are paid in full while other may be subject to a deductible, co-insurance, and/or copay. Some of these benefits include a comprehensive review of your medical history including lifestyle, illnesses that may run in your family, and various assessments and screenings as appropriate. After reviewing your medical record and screening and assessments performed today your provider may have ordered immunizations, labs, imaging, and/or referrals for you. A list of these orders (if applicable) as well as your Preventive Care list are included within your After Visit Summary for your review. Other Preventive Recommendations:    A preventive eye exam performed by an eye specialist is recommended every 1-2 years to screen for glaucoma; cataracts, macular degeneration, and other eye disorders. A preventive dental visit is recommended every 6 months. Try to get at least 150 minutes of exercise per week or 10,000 steps per day on a pedometer . Order or download the FREE \"Exercise & Physical Activity: Your Everyday Guide\" from [employer]. Call 1-125.913.3152 or search [employer] online. You need 5928-6500 mg of calcium and 4765-0952 IU of vitamin D per day. It is possible to meet your calcium requirement with diet alone, but a vitamin D supplement is usually necessary to meet this goal.  When exposed to the sun, use a sunscreen that protects against both UVA and UVB radiation with an SPF of 30 or greater. Reapply every 2 to 3 hours or after sweating, drying off with a towel, or swimming. Always wear a seat belt when traveling in a car. Always wear a helmet when riding a bicycle or motorcycle.

## 2022-09-15 NOTE — PROGRESS NOTES
SUBJECTIVE:  Alexia Deluna is a 67 y.o. male being evaluated for:    Chief Complaint   Patient presents with    Medicare AWV      Patient is here for medicare wellness today . HPI   Here for wellness     Allergies   Allergen Reactions    Bee Venom Anaphylaxis     Current Outpatient Medications   Medication Sig Dispense Refill    EPINEPHrine (Vannie Fred 2-LEO) 0.15 MG/0.3ML SOAJ To take sq with bee sting PRN 2 each 0    amLODIPine (NORVASC) 2.5 MG tablet TAKE ONE TABLET BY MOUTH DAILY 30 tablet 5    Ascorbic Acid (VITAMIN C) 250 MG tablet Take 250 mg by mouth daily      vitamin D3 (CHOLECALCIFEROL) 10 MCG (400 UNIT) TABS tablet Take 400 Units by mouth daily      Multiple Vitamins-Minerals (THERAPEUTIC MULTIVITAMIN-MINERALS) tablet Take 1 tablet by mouth daily      atorvastatin (LIPITOR) 40 MG tablet TAKE 1 TABLET BY MOUTH AT  NIGHT 90 tablet 3    lisinopril (PRINIVIL;ZESTRIL) 40 MG tablet TAKE 1 TABLET BY MOUTH  DAILY 90 tablet 3    apixaban (ELIQUIS) 5 MG TABS tablet TAKE 1 TABLET BY MOUTH  TWICE DAILY 180 tablet 3    diphenhydrAMINE-Zinc Acetate (BENADRYL ITCH RELIEF EX) Apply topically as needed      fluticasone (FLONASE) 50 MCG/ACT nasal spray 2 sprays by Each Nostril route daily 3 Bottle 1    clobetasol (TEMOVATE) 0.05 % cream Apply topically 2 times daily. (Patient taking differently: 2 times daily as needed Apply topically 2 times daily.) 60 g 0    saw palmetto 160 MG capsule Take 160 mg by mouth daily        nitroGLYCERIN (NITROSTAT) 0.4 MG SL tablet Place 0.4 mg under the tongue every 5 minutes as needed. Glucosamine-Chondroit-Vit C-Mn (GLUCOSAMINE CHONDROITIN COMPLX) TABS Take 1 tablet by mouth daily. No current facility-administered medications for this visit.          Social History     Socioeconomic History    Marital status:      Spouse name: Not on file    Number of children: Not on file    Years of education: Not on file    Highest education level: Not on file Occupational History    Not on file   Tobacco Use    Smoking status: Former     Packs/day: 0.50     Years: 7.00     Pack years: 3.50     Types: Cigarettes     Quit date: 1970     Years since quittin.7    Smokeless tobacco: Never   Vaping Use    Vaping Use: Never used   Substance and Sexual Activity    Alcohol use: No    Drug use: No    Sexual activity: Yes     Partners: Female   Other Topics Concern    Not on file   Social History Narrative    Not on file     Social Determinants of Health     Financial Resource Strain: Low Risk     Difficulty of Paying Living Expenses: Not hard at all   Food Insecurity: No Food Insecurity    Worried About Running Out of Food in the Last Year: Never true    Ran Out of Food in the Last Year: Never true   Transportation Needs: No Transportation Needs    Lack of Transportation (Medical): No    Lack of Transportation (Non-Medical):  No   Physical Activity: Not on file   Stress: Not on file   Social Connections: Not on file   Intimate Partner Violence: Not on file   Housing Stability: Not on file      Past Medical History:   Diagnosis Date    Atrial fibrillation ([facility] [address]     Basal cell carcinoma     right ear and right cheek    Bilateral tinnitus     Bradycardia     required pacemaker     CAD (coronary artery disease)     4 stents placed     GERD (gastroesophageal reflux disease)     Hyperlipidemia     Hypertension     Osteoarthritis      Past Surgical History:   Procedure Laterality Date    A-V CARDIAC PACEMAKER INSERTION      bradycardia     CORONARY ANGIOPLASTY WITH STENT PLACEMENT      EXTERNAL EAR SURGERY Right     removed basal cell cancer    SKIN CANCER DESTRUCTION  2017    Right cheek     Family History   Problem Relation Age of Onset    Arthritis Mother     Heart Disease Father     Stroke Father     Other Brother         couldnt walk and in nursing home  disable     No Known Problems Maternal Grandmother     High Blood Pressure Maternal Grandfather     No Known Problems Paternal Grandmother     No Known Problems Paternal Grandfather    ,  Review of Systems   Constitutional:  Positive for unexpected weight change (up). Negative for activity change and fatigue. HENT:  Positive for hearing loss. Negative for congestion, nosebleeds, sinus pressure and trouble swallowing. Eyes:  Negative for visual disturbance. Respiratory:  Negative for cough and shortness of breath. Cardiovascular:  Negative for chest pain, palpitations and leg swelling. Gastrointestinal:  Negative for abdominal pain, blood in stool, constipation, diarrhea, nausea and vomiting. Genitourinary:  Negative for difficulty urinating and dysuria. Musculoskeletal:  Positive for arthralgias (fingers). Skin:         No concerning skin lesions   Neurological:  Negative for dizziness, syncope, speech difficulty, weakness, light-headedness, numbness and headaches. Hematological:  Does not bruise/bleed easily. Psychiatric/Behavioral:  Negative for dysphoric mood and sleep disturbance. OBJECTIVE:  /79 (Site: Right Upper Arm, Position: Sitting, Cuff Size: Medium Adult)   Pulse 60   Temp 98.3 °F (36.8 °C) (Oral)   Ht 6' 2\" (1.88 m)   Wt 217 lb (98.4 kg)   SpO2 98%   BMI 27.86 kg/m²      Body mass index is 27.86 kg/m². Physical Exam  Vitals and nursing note reviewed. Constitutional:       General: He is not in acute distress. Appearance: Normal appearance. He is well-developed. HENT:      Head: Normocephalic and atraumatic. Right Ear: Tympanic membrane, ear canal and external ear normal.      Left Ear: Tympanic membrane, ear canal and external ear normal.      Ears:      Comments: Very hard of hearing      Nose: Nose normal.      Mouth/Throat:      Pharynx: Oropharynx is clear. Eyes:      Conjunctiva/sclera: Conjunctivae normal.   Neck:      Thyroid: No thyromegaly. Vascular: No JVD. Cardiovascular:      Rate and Rhythm: Normal rate and regular rhythm.       Heart sounds: Normal heart sounds. No murmur heard. No friction rub. No gallop. Pulmonary:      Effort: Pulmonary effort is normal.      Breath sounds: Normal breath sounds. Chest:      Chest wall: No tenderness. Abdominal:      General: There is no distension. Palpations: Abdomen is soft. Tenderness: There is no abdominal tenderness. Comments: No HSM   Musculoskeletal:         General: No swelling. Cervical back: Neck supple. Lymphadenopathy:      Cervical: No cervical adenopathy. Skin:     General: Skin is warm and dry. Neurological:      General: No focal deficit present. Mental Status: He is alert. Gait: Gait normal.   Psychiatric:         Mood and Affect: Mood normal.         Behavior: Behavior normal.         Thought Content: Thought content normal.       ASSESSMENT/PLAN:    Florentino Back was seen today for medicare awv. Diagnoses and all orders for this visit:    Medicare annual wellness visit, subsequent    Atrial fibrillation with slow ventricular response ([facility] [address] pacer and follows with Dr Elizabeth Wick   -     CBC with Auto Differential; Future    Coronary artery disease involving native coronary artery of native heart without angina pectoris    Essential hypertension  -     Comprehensive Metabolic Panel; Future    Bilateral hearing loss, unspecified hearing loss type has refused hearing aids due to cost     Hypercholesterolemia  -     Comprehensive Metabolic Panel; Future  -     Lipid Panel; Future    Encounter for hepatitis C screening test for low risk patient  -     Hepatitis C Antibody;  Future    Pre-diabetes  -     Hemoglobin A1C; Future    Bee allergy   -     EPINEPHrine (EPIPEN JR 2-LEO) 0.15 MG/0.3ML SOAJ; To take sq with bee sting PRN      Orders Placed This Encounter   Medications    EPINEPHrine (Sandy Bessie 2-LEO) 0.15 MG/0.3ML SOAJ     Sig: To take sq with bee sting PRN     Dispense:  2 each     Refill:  0        Return in 6 months (on 3/15/2023), or if symptoms worsen or fail to improve, for fasting labs and follow up . Patient Instructions   Personalized Preventive Plan for Zeeshan Lezama - 9/15/2022  Medicare offers a range of preventive health benefits. Some of the tests and screenings are paid in full while other may be subject to a deductible, co-insurance, and/or copay. Some of these benefits include a comprehensive review of your medical history including lifestyle, illnesses that may run in your family, and various assessments and screenings as appropriate. After reviewing your medical record and screening and assessments performed today your provider may have ordered immunizations, labs, imaging, and/or referrals for you. A list of these orders (if applicable) as well as your Preventive Care list are included within your After Visit Summary for your review. Other Preventive Recommendations:    A preventive eye exam performed by an eye specialist is recommended every 1-2 years to screen for glaucoma; cataracts, macular degeneration, and other eye disorders. A preventive dental visit is recommended every 6 months. Try to get at least 150 minutes of exercise per week or 10,000 steps per day on a pedometer . Order or download the FREE \"Exercise & Physical Activity: Your Everyday Guide\" from [employer]. Call 6-957.168.1471 or search [employer] online. You need 9551-9354 mg of calcium and 6794-9186 IU of vitamin D per day. It is possible to meet your calcium requirement with diet alone, but a vitamin D supplement is usually necessary to meet this goal.  When exposed to the sun, use a sunscreen that protects against both UVA and UVB radiation with an SPF of 30 or greater. Reapply every 2 to 3 hours or after sweating, drying off with a towel, or swimming. Always wear a seat belt when traveling in a car. Always wear a helmet when riding a bicycle or motorcycle.    Medicare Annual Wellness Visit    Zeeshan Lezama is here for Medicare AWV ( Patient is here for medicare wellness today . )    Assessment & Plan   Medicare annual wellness visit, subsequent  Atrial fibrillation with slow ventricular response ([address]  -     CBC with Auto Differential; Future  Coronary artery disease involving native coronary artery of native heart without angina pectoris  Essential hypertension  -     Comprehensive Metabolic Panel; Future  Bilateral hearing loss, unspecified hearing loss type  Hypercholesterolemia  -     Comprehensive Metabolic Panel; Future  -     Lipid Panel; Future  Encounter for hepatitis C screening test for low risk patient  -     Hepatitis C Antibody; Future  Pre-diabetes  -     Hemoglobin A1C; Future    Recommendations for Preventive Services Due: see orders and patient instructions/AVS.  Recommended screening schedule for the next 5-10 years is provided to the patient in written form: see Patient Instructions/AVS.     Return in 6 months (on 3/15/2023), or if symptoms worsen or fail to improve, for fasting labs and follow up . Subjective       Patient's complete Health Risk Assessment and screening values have been reviewed and are found in Flowsheets. The following problems were reviewed today and where indicated follow up appointments were made and/or referrals ordered. Positive Risk Factor Screenings with Interventions:     Cognitive: Words recalled: 0 Words Recalled  Clock Drawing Test (CDT): Normal  Total Score Interpretation: Abnormal Mini-Cog  Did the patient refuse to take the cognition test?: No  Cognitive Impairment Interventions:  trouble hearing the words ?            General Health and ACP:       Advance Directives       Power of  Living Will ACP-Advance Directive ACP-Power of     Not on File Not on File Not on File Not on File          General Health Risk Interventions:  No Living Will: Advance Care Planning addressed with patient today  states he is working on it           Trouble with hearing has tried 2 before and a failure   Has seen ent     Safety devices  in the comlist        Objective   Vitals:    09/15/22 1419   BP: 139/79   Site: Right Upper Arm   Position: Sitting   Cuff Size: Medium Adult   Pulse: 60   Temp: 98.3 °F (36.8 °C)   TempSrc: Oral   SpO2: 98%   Weight: 217 lb (98.4 kg)   Height: 6' 2\" (1.88 m)      Body mass index is 27.86 kg/m². See above physical        Allergies   Allergen Reactions    Bee Venom Anaphylaxis     Prior to Visit Medications    Medication Sig Taking? Authorizing Provider   EPINEPHrine ([employer] 2-LEO) 0.15 MG/0.3ML SOAJ To take sq with bee sting PRN Yes Waldemar Suarez MD   amLODIPine (NORVASC) 2.5 MG tablet TAKE ONE TABLET BY MOUTH DAILY Yes TATI Hernandez NP   Ascorbic Acid (VITAMIN C) 250 MG tablet Take 250 mg by mouth daily Yes Historical Provider, MD   vitamin D3 (CHOLECALCIFEROL) 10 MCG (400 UNIT) TABS tablet Take 400 Units by mouth daily Yes Historical Provider, MD   Multiple Vitamins-Minerals (THERAPEUTIC MULTIVITAMIN-MINERALS) tablet Take 1 tablet by mouth daily Yes Historical Provider, MD   atorvastatin (LIPITOR) 40 MG tablet TAKE 1 TABLET BY MOUTH AT  NIGHT Yes TATI Hernandez NP   lisinopril (PRINIVIL;ZESTRIL) 40 MG tablet TAKE 1 TABLET BY MOUTH  DAILY Yes TATI Hernandez NP   apixaban (ELIQUIS) 5 MG TABS tablet TAKE 1 TABLET BY MOUTH  TWICE DAILY Yes Robin Leyva MD   diphenhydrAMINE-Zinc Acetate (BENADRYL ITCH RELIEF EX) Apply topically as needed Yes Historical Provider, MD   fluticasone (FLONASE) 50 MCG/ACT nasal spray 2 sprays by Each Nostril route daily Yes Waldemar Suarez MD   clobetasol (TEMOVATE) 0.05 % cream Apply topically 2 times daily. Patient taking differently: 2 times daily as needed Apply topically 2 times daily.  Yes Waldemar Suarez MD   saw palmetto 160 MG capsule Take 160 mg by mouth daily   Yes Historical Provider, MD   nitroGLYCERIN (NITROSTAT) 0.4 MG SL tablet Place 0.4 mg under the tongue every 5 minutes as needed. Yes Historical Provider, MD   Glucosamine-Chondroit-Vit C-Mn (GLUCOSAMINE CHONDROITIN COMPLX) TABS Take 1 tablet by mouth daily.    Yes Historical Provider, MD Pritchard (Including outside providers/suppliers regularly involved in providing care):   Patient Care Team:  Mercy Santiago MD as PCP - General (Internal Medicine)  Mercy Santiago MD as PCP - [facility] Empaneled Provider  Ton Sainz MD as Consulting Physician (Cardiology)  Alvarez Cintron MD as Consulting Physician (Otolaryngology)     Reviewed and updated this visit:  Tobacco  Allergies  Meds  Med Hx  Surg Hx  Soc Hx  Fam Hx

## 2022-10-05 ENCOUNTER — OFFICE VISIT (OUTPATIENT)
Dept: CARDIOLOGY CLINIC | Age: 77
End: 2022-10-05
Payer: MEDICARE

## 2022-10-05 VITALS
SYSTOLIC BLOOD PRESSURE: 118 MMHG | DIASTOLIC BLOOD PRESSURE: 70 MMHG | WEIGHT: 214 LBS | OXYGEN SATURATION: 97 % | HEART RATE: 80 BPM | BODY MASS INDEX: 27.46 KG/M2 | HEIGHT: 74 IN

## 2022-10-05 DIAGNOSIS — Z95.0 PACEMAKER: ICD-10-CM

## 2022-10-05 DIAGNOSIS — I10 ESSENTIAL HYPERTENSION: ICD-10-CM

## 2022-10-05 DIAGNOSIS — I48.20 CHRONIC A-FIB (HCC): Primary | ICD-10-CM

## 2022-10-05 DIAGNOSIS — E78.00 HYPERCHOLESTEROLEMIA: ICD-10-CM

## 2022-10-05 DIAGNOSIS — I25.10 CAD IN NATIVE ARTERY: ICD-10-CM

## 2022-10-05 PROCEDURE — 1123F ACP DISCUSS/DSCN MKR DOCD: CPT | Performed by: INTERNAL MEDICINE

## 2022-10-05 PROCEDURE — G8417 CALC BMI ABV UP PARAM F/U: HCPCS | Performed by: INTERNAL MEDICINE

## 2022-10-05 PROCEDURE — G8484 FLU IMMUNIZE NO ADMIN: HCPCS | Performed by: INTERNAL MEDICINE

## 2022-10-05 PROCEDURE — 99214 OFFICE O/P EST MOD 30 MIN: CPT | Performed by: INTERNAL MEDICINE

## 2022-10-05 PROCEDURE — 1036F TOBACCO NON-USER: CPT | Performed by: INTERNAL MEDICINE

## 2022-10-05 PROCEDURE — G8427 DOCREV CUR MEDS BY ELIG CLIN: HCPCS | Performed by: INTERNAL MEDICINE

## 2022-10-05 PROCEDURE — 93000 ELECTROCARDIOGRAM COMPLETE: CPT | Performed by: INTERNAL MEDICINE

## 2022-10-05 NOTE — PROGRESS NOTES
[facility]   Office Progress Note             Johana Gunderson MD       HPI:  Patient with history of HLD, HTN, CAD s/p stents to the LAD in 2008; New onset AF in 7/27/16 . Later developed symptomatic bradycardia, experienced falls, dizziness, lightheadedness. He underwent PPM implant on 6/20/18 ([facility]). Wears bilateral hearing aids. 10/14/2021: Has no new cardiac complaints. Able to cut grass with no chest pain / pressure. Doing well. Only complaints today is knee pain. Today, he returns for a yearly follow up accompanied by his brother. He denies any new or recurrent cardiac symptoms. He is walking with no complaints. Patient denies exertional chest pain/pressure, dyspnea at rest, BAEZ, PND, orthopnea, palpitations, lightheadedness, weight changes, changes in LE edema, and syncope. He had routine labs completed earlier this year. He also admits to medical therapy compliance and tolerating. Physical Examination:    /70   Pulse 80   Ht 6' 2\" (1.88 m)   Wt 214 lb (97.1 kg)   SpO2 97%   BMI 27.48 kg/m²    HEENT:  Face: Atraumatic, Conjunctiva: Pink; non icteric,  Mucous Memb:  Moist, No thyromegaly or Lymphadenopathy  Respiratory:  Resp Assessment: normal, Resp Auscultation: clear   Cardiovascular: Auscultation: nl S1 & S2, Palpation:  Nl PMI;  No heaves or thrills, JVP:  normal  Abdomen: Soft, non-tender, Normal bowel sounds,  No organomegaly  Extremities: No Cyanosis or Clubbing; Edema none  Neurological: Oriented to time, place, and person, Non-anxious  Psychiatric: Normal mood and affect  Skin: Warm and dry,  No rash seen    Current Outpatient Medications   Medication Sig Dispense Refill    apixaban (ELIQUIS) 5 MG TABS tablet TAKE 1 TABLET BY MOUTH  TWICE DAILY 180 tablet 1    EPINEPHrine (EPIPEN JR 2-LEO) 0.15 MG/0.3ML SOAJ To take sq with bee sting PRN 2 each 0    amLODIPine (NORVASC) 2.5 MG tablet TAKE ONE TABLET BY MOUTH DAILY 30 tablet 5    Ascorbic Acid (VITAMIN C) 250 MG tablet Take 250 mg by mouth daily      vitamin D3 (CHOLECALCIFEROL) 10 MCG (400 UNIT) TABS tablet Take 400 Units by mouth daily      Multiple Vitamins-Minerals (THERAPEUTIC MULTIVITAMIN-MINERALS) tablet Take 1 tablet by mouth daily      atorvastatin (LIPITOR) 40 MG tablet TAKE 1 TABLET BY MOUTH AT  NIGHT 90 tablet 3    lisinopril (PRINIVIL;ZESTRIL) 40 MG tablet TAKE 1 TABLET BY MOUTH  DAILY 90 tablet 3    diphenhydrAMINE-Zinc Acetate (BENADRYL ITCH RELIEF EX) Apply topically as needed      fluticasone (FLONASE) 50 MCG/ACT nasal spray 2 sprays by Each Nostril route daily 3 Bottle 1    clobetasol (TEMOVATE) 0.05 % cream Apply topically 2 times daily. (Patient taking differently: 2 times daily as needed Apply topically 2 times daily.) 60 g 0    saw palmetto 160 MG capsule Take 160 mg by mouth daily        nitroGLYCERIN (NITROSTAT) 0.4 MG SL tablet Place 0.4 mg under the tongue every 5 minutes as needed. Glucosamine-Chondroit-Vit C-Mn (GLUCOSAMINE CHONDROITIN COMPLX) TABS Take 1 tablet by mouth daily. No current facility-administered medications for this visit. ECG:    10/4/19: device interrogated today   10/12/20: ventricular pacemaker   10/14/21: device interrogated today  10/5/2022: vpaced, reviewed remote interrogation 9/1/22    ECHO 8/2/16  Concentric LVH with normal systolic function. EF is 60% The left atrium is at the upper limits of normal in size. Trivial mitral, aortic and tricuspid regurgitation is present. Normal right ventricular size and function. Stress MPI: 12/8/2008  Large sized severe apical, anteroseptal and septal completely reversible defect consistent with ischemia in the territory typical of the proximal LAD.       Corornary angiogram  & Intervention: 2008  Single vessel CAD involving LAD  Normal LV function  S/p multiple stents in the LAD      Assessment/Plan:      Chronic Atrial Fibrillation  CHads Vasc at least 2 (Age, HTN)  Denies symptoms palpitations, heart racing, fluttering, fatigue, BAEZ  Continue Eliquis 5 mg BID  Denies abnormal bruising or bleeding   5/27/22 stable but abnormal CBC   EKG vpaced, reviewed last remote device interrogation    CAD  S/p stent LAD   No angina reported since last visit   No ASA secondary to Eliquis for Afib     Essential Hypertension  BP is stable today  Continue medical management   Stable BMP 5/26/22  Continue to work on low salt diet    Hypercholesterolemia  LDLc goal 55-70  LDLc 61 5/27/22  Continue statin therapy   No reported myalgias     PPM  Implanted 6/20/18  Medtronic Micro Leadless Pacemaker   Device checked remotely 9/1/22 reviewed  EKG today vpaced       Follow up in 1 year. Thank you very much for allowing me to participate in the care of your patient. Please do not hesitate to contact me if you have any questions. Sincerely,    Jenny Hill M.D  [facility], [address] [place] [address]  Ph: [phone]  Fax: [phone]    This note was scribed in the presence of Louana Brittle, MD by Manju Stephens RN. Physician Attestation:  The scribes documentation has been prepared under my direction and personally reviewed by me in its entirety. I confirm that the note above accurately reflects all work, treatment, procedures, and medical decision making performed by me.

## 2022-12-13 ENCOUNTER — NURSE ONLY (OUTPATIENT)
Dept: CARDIOLOGY CLINIC | Age: 77
End: 2022-12-13

## 2022-12-13 DIAGNOSIS — R00.1 BRADYCARDIA WITH 31-40 BEATS PER MINUTE: ICD-10-CM

## 2022-12-13 DIAGNOSIS — Z95.0 PACEMAKER: Primary | ICD-10-CM

## 2022-12-16 NOTE — PROGRESS NOTES
We received remote transmission from patient's Micra pacemaker monitor at home (Eliquis). Transmission shows normal sensing and pacing function.  99.3%. No observations based on current interrogation. EP physician will review. See interrogation under cardiology tab in the [address] [place] [address] [address] field for more details.     (End of 91-day monitoring period 12/13/22)

## 2023-01-03 DIAGNOSIS — I10 BENIGN HYPERTENSION: ICD-10-CM

## 2023-01-03 RX ORDER — AMLODIPINE BESYLATE 2.5 MG/1
TABLET ORAL
Qty: 30 TABLET | Refills: 5 | Status: SHIPPED | OUTPATIENT
Start: 2023-01-03

## 2023-03-14 ENCOUNTER — NURSE ONLY (OUTPATIENT)
Dept: CARDIOLOGY CLINIC | Age: 78
End: 2023-03-14

## 2023-03-14 DIAGNOSIS — R00.1 BRADYCARDIA WITH 31-40 BEATS PER MINUTE: ICD-10-CM

## 2023-03-14 DIAGNOSIS — Z95.0 PACEMAKER: Primary | ICD-10-CM

## 2023-03-17 NOTE — PROGRESS NOTES
We received remote transmission from patient's Micra pacemaker monitor at home (Eliquis). Transmission shows normal sensing and pacing function.  99.3%. No observations based on current interrogation. EP physician will review. See interrogation under cardiology tab in the [address] [place] [address] [address] field for more details.     (End of 91-day monitoring period 3/14/23)

## 2023-03-23 RX ORDER — ATORVASTATIN CALCIUM 40 MG/1
TABLET, FILM COATED ORAL
Qty: 90 TABLET | Refills: 1 | Status: SHIPPED | OUTPATIENT
Start: 2023-03-23

## 2023-03-23 RX ORDER — LISINOPRIL 40 MG/1
40 TABLET ORAL DAILY
Qty: 90 TABLET | Refills: 1 | Status: SHIPPED | OUTPATIENT
Start: 2023-03-23

## 2023-06-20 ENCOUNTER — NURSE ONLY (OUTPATIENT)
Dept: CARDIOLOGY CLINIC | Age: 78
End: 2023-06-20
Payer: MEDICARE

## 2023-06-20 DIAGNOSIS — Z95.0 PACEMAKER: Primary | ICD-10-CM

## 2023-06-20 DIAGNOSIS — I45.9 HB (HEART BLOCK): ICD-10-CM

## 2023-06-20 PROCEDURE — 93296 REM INTERROG EVL PM/IDS: CPT | Performed by: INTERNAL MEDICINE

## 2023-06-20 PROCEDURE — 93294 REM INTERROG EVL PM/LDLS PM: CPT | Performed by: INTERNAL MEDICINE

## 2023-08-01 DIAGNOSIS — Z11.59 ENCOUNTER FOR HEPATITIS C SCREENING TEST FOR LOW RISK PATIENT: ICD-10-CM

## 2023-08-01 DIAGNOSIS — I10 ESSENTIAL HYPERTENSION: ICD-10-CM

## 2023-08-01 DIAGNOSIS — R73.03 PRE-DIABETES: ICD-10-CM

## 2023-08-01 DIAGNOSIS — I48.91 ATRIAL FIBRILLATION WITH SLOW VENTRICULAR RESPONSE (HCC): ICD-10-CM

## 2023-08-01 DIAGNOSIS — I48.91 ATRIAL FIBRILLATION WITH SLOW VENTRICULAR RESPONSE (HCC): Primary | ICD-10-CM

## 2023-08-01 DIAGNOSIS — E78.00 HYPERCHOLESTEROLEMIA: ICD-10-CM

## 2023-08-01 LAB
ALBUMIN SERPL-MCNC: 4.5 G/DL (ref 3.4–5)
ALBUMIN/GLOB SERPL: 1.7 {RATIO} (ref 1.1–2.2)
ALP SERPL-CCNC: 114 U/L (ref 40–129)
ALT SERPL-CCNC: 15 U/L (ref 10–40)
ANION GAP SERPL CALCULATED.3IONS-SCNC: 17 MMOL/L (ref 3–16)
AST SERPL-CCNC: 28 U/L (ref 15–37)
BASOPHILS # BLD: 0.1 K/UL (ref 0–0.2)
BASOPHILS NFR BLD: 0.9 %
BILIRUB SERPL-MCNC: 1.5 MG/DL (ref 0–1)
BUN SERPL-MCNC: 11 MG/DL (ref 7–20)
CALCIUM SERPL-MCNC: 10.3 MG/DL (ref 8.3–10.6)
CHLORIDE SERPL-SCNC: 103 MMOL/L (ref 99–110)
CHOLEST SERPL-MCNC: 135 MG/DL (ref 0–199)
CO2 SERPL-SCNC: 21 MMOL/L (ref 21–32)
CREAT SERPL-MCNC: 1.3 MG/DL (ref 0.8–1.3)
DEPRECATED RDW RBC AUTO: 13.4 % (ref 12.4–15.4)
EOSINOPHIL # BLD: 0.2 K/UL (ref 0–0.6)
EOSINOPHIL NFR BLD: 3.5 %
EST. AVERAGE GLUCOSE BLD GHB EST-MCNC: 122.6 MG/DL
GFR SERPLBLD CREATININE-BSD FMLA CKD-EPI: 56 ML/MIN/{1.73_M2}
GLUCOSE SERPL-MCNC: 113 MG/DL (ref 70–99)
HBA1C MFR BLD: 5.9 %
HCT VFR BLD AUTO: 40.4 % (ref 40.5–52.5)
HCV AB SERPL QL IA: NORMAL
HDLC SERPL-MCNC: 50 MG/DL (ref 40–60)
HGB BLD-MCNC: 14.3 G/DL (ref 13.5–17.5)
LDLC SERPL CALC-MCNC: 65 MG/DL
LYMPHOCYTES # BLD: 3.2 K/UL (ref 1–5.1)
LYMPHOCYTES NFR BLD: 53.7 %
MCH RBC QN AUTO: 32.3 PG (ref 26–34)
MCHC RBC AUTO-ENTMCNC: 35.4 G/DL (ref 31–36)
MCV RBC AUTO: 91.3 FL (ref 80–100)
MONOCYTES # BLD: 0.4 K/UL (ref 0–1.3)
MONOCYTES NFR BLD: 5.9 %
NEUTROPHILS # BLD: 2.2 K/UL (ref 1.7–7.7)
NEUTROPHILS NFR BLD: 36 %
PLATELET # BLD AUTO: 174 K/UL (ref 135–450)
PMV BLD AUTO: 9.4 FL (ref 5–10.5)
POTASSIUM SERPL-SCNC: 4.1 MMOL/L (ref 3.5–5.1)
PROT SERPL-MCNC: 7.1 G/DL (ref 6.4–8.2)
RBC # BLD AUTO: 4.42 M/UL (ref 4.2–5.9)
SODIUM SERPL-SCNC: 141 MMOL/L (ref 136–145)
TRIGL SERPL-MCNC: 98 MG/DL (ref 0–150)
VLDLC SERPL CALC-MCNC: 20 MG/DL
WBC # BLD AUTO: 6 K/UL (ref 4–11)

## 2023-08-02 DIAGNOSIS — I10 BENIGN HYPERTENSION: ICD-10-CM

## 2023-08-02 RX ORDER — AMLODIPINE BESYLATE 2.5 MG/1
TABLET ORAL
Qty: 30 TABLET | OUTPATIENT
Start: 2023-08-02

## 2023-08-03 DIAGNOSIS — I10 BENIGN HYPERTENSION: ICD-10-CM

## 2023-08-03 RX ORDER — AMLODIPINE BESYLATE 2.5 MG/1
TABLET ORAL
Qty: 90 TABLET | OUTPATIENT
Start: 2023-08-03

## 2023-09-06 RX ORDER — LISINOPRIL 40 MG/1
40 TABLET ORAL DAILY
Qty: 90 TABLET | Refills: 1 | Status: SHIPPED | OUTPATIENT
Start: 2023-09-06

## 2023-09-18 RX ORDER — ATORVASTATIN CALCIUM 40 MG/1
TABLET, FILM COATED ORAL
Qty: 90 TABLET | Refills: 1 | Status: SHIPPED | OUTPATIENT
Start: 2023-09-18

## 2023-09-29 PROCEDURE — 93296 REM INTERROG EVL PM/IDS: CPT | Performed by: INTERNAL MEDICINE

## 2023-09-29 PROCEDURE — 93294 REM INTERROG EVL PM/LDLS PM: CPT | Performed by: INTERNAL MEDICINE

## 2023-10-02 ENCOUNTER — OFFICE VISIT (OUTPATIENT)
Dept: FAMILY MEDICINE CLINIC | Age: 78
End: 2023-10-02

## 2023-10-02 VITALS
OXYGEN SATURATION: 98 % | HEART RATE: 80 BPM | SYSTOLIC BLOOD PRESSURE: 148 MMHG | HEIGHT: 74 IN | WEIGHT: 210 LBS | DIASTOLIC BLOOD PRESSURE: 80 MMHG | BODY MASS INDEX: 26.95 KG/M2 | TEMPERATURE: 98 F

## 2023-10-02 DIAGNOSIS — I25.10 CORONARY ARTERY DISEASE INVOLVING NATIVE CORONARY ARTERY OF NATIVE HEART WITHOUT ANGINA PECTORIS: ICD-10-CM

## 2023-10-02 DIAGNOSIS — I10 ESSENTIAL HYPERTENSION: ICD-10-CM

## 2023-10-02 DIAGNOSIS — H61.101 LESION OF PINNA, RIGHT: ICD-10-CM

## 2023-10-02 DIAGNOSIS — R73.03 PRE-DIABETES: ICD-10-CM

## 2023-10-02 DIAGNOSIS — I10 BENIGN HYPERTENSION: ICD-10-CM

## 2023-10-02 DIAGNOSIS — I48.20 CHRONIC A-FIB (HCC): ICD-10-CM

## 2023-10-02 DIAGNOSIS — Z00.00 MEDICARE ANNUAL WELLNESS VISIT, SUBSEQUENT: Primary | ICD-10-CM

## 2023-10-02 RX ORDER — AMLODIPINE BESYLATE 5 MG/1
5 TABLET ORAL DAILY
Qty: 90 TABLET | Refills: 1 | Status: SHIPPED | OUTPATIENT
Start: 2023-10-02

## 2023-10-02 RX ORDER — AMLODIPINE BESYLATE 2.5 MG/1
TABLET ORAL
Qty: 30 TABLET | Refills: 5 | Status: CANCELLED | OUTPATIENT
Start: 2023-10-02

## 2023-10-02 SDOH — ECONOMIC STABILITY: INCOME INSECURITY: HOW HARD IS IT FOR YOU TO PAY FOR THE VERY BASICS LIKE FOOD, HOUSING, MEDICAL CARE, AND HEATING?: NOT HARD AT ALL

## 2023-10-02 SDOH — ECONOMIC STABILITY: FOOD INSECURITY: WITHIN THE PAST 12 MONTHS, THE FOOD YOU BOUGHT JUST DIDN'T LAST AND YOU DIDN'T HAVE MONEY TO GET MORE.: NEVER TRUE

## 2023-10-02 SDOH — ECONOMIC STABILITY: FOOD INSECURITY: WITHIN THE PAST 12 MONTHS, YOU WORRIED THAT YOUR FOOD WOULD RUN OUT BEFORE YOU GOT MONEY TO BUY MORE.: NEVER TRUE

## 2023-10-02 SDOH — ECONOMIC STABILITY: HOUSING INSECURITY
IN THE LAST 12 MONTHS, WAS THERE A TIME WHEN YOU DID NOT HAVE A STEADY PLACE TO SLEEP OR SLEPT IN A SHELTER (INCLUDING NOW)?: NO

## 2023-10-02 ASSESSMENT — ENCOUNTER SYMPTOMS
TROUBLE SWALLOWING: 0
ROS SKIN COMMENTS: NO CONCERNING SKIN LESIONS
CONSTIPATION: 0
SINUS PRESSURE: 0
COUGH: 0
NAUSEA: 0
DIARRHEA: 0
ABDOMINAL PAIN: 0
SHORTNESS OF BREATH: 0
BACK PAIN: 0
VOMITING: 0
BLOOD IN STOOL: 0

## 2023-10-02 ASSESSMENT — PATIENT HEALTH QUESTIONNAIRE - PHQ9
2. FEELING DOWN, DEPRESSED OR HOPELESS: 0
SUM OF ALL RESPONSES TO PHQ QUESTIONS 1-9: 0
1. LITTLE INTEREST OR PLEASURE IN DOING THINGS: 0
SUM OF ALL RESPONSES TO PHQ9 QUESTIONS 1 & 2: 0
SUM OF ALL RESPONSES TO PHQ QUESTIONS 1-9: 0

## 2023-10-02 ASSESSMENT — LIFESTYLE VARIABLES
HOW OFTEN DO YOU HAVE A DRINK CONTAINING ALCOHOL: NEVER
HOW MANY STANDARD DRINKS CONTAINING ALCOHOL DO YOU HAVE ON A TYPICAL DAY: PATIENT DOES NOT DRINK

## 2023-10-02 NOTE — PROGRESS NOTES
SUBJECTIVE:  Merary Julien is a 65 y.o. male being evaluated for:    Chief Complaint   Patient presents with    Medicare AW       HPI   Here for medicare wellness and is doing well      Allergies   Allergen Reactions    Bee Venom Anaphylaxis     Current Outpatient Medications   Medication Sig Dispense Refill    amLODIPine (NORVASC) 5 MG tablet Take 1 tablet by mouth daily 90 tablet 1    atorvastatin (LIPITOR) 40 MG tablet TAKE 1 TABLET BY MOUTH AT NIGHT 90 tablet 1    lisinopril (PRINIVIL;ZESTRIL) 40 MG tablet TAKE 1 TABLET BY MOUTH DAILY 90 tablet 1    apixaban (ELIQUIS) 5 MG TABS tablet TAKE 1 TABLET BY MOUTH  TWICE DAILY 180 tablet 1    EPINEPHrine (EPIPEN JR 2-LEO) 0.15 MG/0.3ML SOAJ To take sq with bee sting PRN 2 each 0    Ascorbic Acid (VITAMIN C) 250 MG tablet Take 1 tablet by mouth daily      vitamin D3 (CHOLECALCIFEROL) 10 MCG (400 UNIT) TABS tablet Take 1 tablet by mouth daily      Multiple Vitamins-Minerals (THERAPEUTIC MULTIVITAMIN-MINERALS) tablet Take 1 tablet by mouth daily      diphenhydrAMINE-Zinc Acetate (BENADRYL ITCH RELIEF EX) Apply topically as needed      fluticasone (FLONASE) 50 MCG/ACT nasal spray 2 sprays by Each Nostril route daily 3 Bottle 1    clobetasol (TEMOVATE) 0.05 % cream Apply topically 2 times daily. (Patient taking differently: 2 times daily as needed Apply topically 2 times daily.) 60 g 0    saw palmetto 160 MG capsule Take 1 capsule by mouth daily      Glucosamine-Chondroit-Vit C-Mn (GLUCOSAMINE CHONDROITIN COMPLX) TABS Take 1 tablet by mouth daily. nitroGLYCERIN (NITROSTAT) 0.4 MG SL tablet Place 0.4 mg under the tongue every 5 minutes as needed. (Patient not taking: Reported on 10/2/2023)       No current facility-administered medications for this visit.          Social History     Socioeconomic History    Marital status:      Spouse name: Not on file    Number of children: Not on file    Years of education: Not on file    Highest education level: Not on

## 2023-10-18 ENCOUNTER — OFFICE VISIT (OUTPATIENT)
Dept: CARDIOLOGY CLINIC | Age: 78
End: 2023-10-18
Payer: MEDICARE

## 2023-10-18 VITALS
WEIGHT: 209 LBS | BODY MASS INDEX: 26.82 KG/M2 | HEIGHT: 74 IN | HEART RATE: 64 BPM | DIASTOLIC BLOOD PRESSURE: 84 MMHG | OXYGEN SATURATION: 98 % | SYSTOLIC BLOOD PRESSURE: 130 MMHG

## 2023-10-18 DIAGNOSIS — R00.0 TACHYCARDIA: Primary | ICD-10-CM

## 2023-10-18 PROCEDURE — 1123F ACP DISCUSS/DSCN MKR DOCD: CPT | Performed by: INTERNAL MEDICINE

## 2023-10-18 PROCEDURE — 1036F TOBACCO NON-USER: CPT | Performed by: INTERNAL MEDICINE

## 2023-10-18 PROCEDURE — G8427 DOCREV CUR MEDS BY ELIG CLIN: HCPCS | Performed by: INTERNAL MEDICINE

## 2023-10-18 PROCEDURE — G8484 FLU IMMUNIZE NO ADMIN: HCPCS | Performed by: INTERNAL MEDICINE

## 2023-10-18 PROCEDURE — 93000 ELECTROCARDIOGRAM COMPLETE: CPT | Performed by: INTERNAL MEDICINE

## 2023-10-18 PROCEDURE — 3079F DIAST BP 80-89 MM HG: CPT | Performed by: INTERNAL MEDICINE

## 2023-10-18 PROCEDURE — 3075F SYST BP GE 130 - 139MM HG: CPT | Performed by: INTERNAL MEDICINE

## 2023-10-18 PROCEDURE — 99214 OFFICE O/P EST MOD 30 MIN: CPT | Performed by: INTERNAL MEDICINE

## 2023-10-18 PROCEDURE — G8417 CALC BMI ABV UP PARAM F/U: HCPCS | Performed by: INTERNAL MEDICINE

## 2023-12-11 ENCOUNTER — TELEPHONE (OUTPATIENT)
Dept: CARDIOLOGY CLINIC | Age: 78
End: 2023-12-11

## 2023-12-11 NOTE — TELEPHONE ENCOUNTER
Sly Mart called in this afternoon, he would like all medications from Dr. Stephania Short to be sent to [facility]. He can be reached at [phone].

## 2023-12-12 NOTE — PROGRESS NOTES
Last OV:10/18/2023  Last Labs:8/01/2023  Last Refills:10/18/23  Next Appt:Follow up in 1 year  Last EKG:  10/18/2023

## 2023-12-29 PROCEDURE — 93294 REM INTERROG EVL PM/LDLS PM: CPT | Performed by: INTERNAL MEDICINE

## 2023-12-29 PROCEDURE — 93296 REM INTERROG EVL PM/IDS: CPT | Performed by: INTERNAL MEDICINE

## 2024-01-05 ENCOUNTER — TELEPHONE (OUTPATIENT)
Dept: CARDIOLOGY CLINIC | Age: 79
End: 2024-01-05

## 2024-01-05 NOTE — TELEPHONE ENCOUNTER
Please reach out to patient to send manual remote pacemaker transmission to monitor battery (ASHLEY approaching, 100% ). Thanks!

## 2024-01-05 NOTE — TELEPHONE ENCOUNTER
Spoke to pt's wife and informed. V/u. Pt's wife states when their son is home from work he will help them send it in. Will keep an eye out for transmission.

## 2024-01-08 NOTE — TELEPHONE ENCOUNTER
Patient's son Benito called on Friday Evening @ 5:44pm  to let us know that the transmission was sent in .  Was also asking when will Maxx be scheduled for a battery change procedure.      Please call Benito at [phone]

## 2024-02-12 RX ORDER — ATORVASTATIN CALCIUM 40 MG/1
TABLET, FILM COATED ORAL
Qty: 90 TABLET | Refills: 3 | Status: SHIPPED | OUTPATIENT
Start: 2024-02-12

## 2024-02-12 RX ORDER — LISINOPRIL 40 MG/1
40 TABLET ORAL DAILY
Qty: 90 TABLET | Refills: 3 | Status: SHIPPED | OUTPATIENT
Start: 2024-02-12

## 2024-02-14 DIAGNOSIS — I10 ESSENTIAL HYPERTENSION: ICD-10-CM

## 2024-02-14 DIAGNOSIS — I10 BENIGN HYPERTENSION: ICD-10-CM

## 2024-02-14 RX ORDER — AMLODIPINE BESYLATE 5 MG/1
5 TABLET ORAL DAILY
Qty: 90 TABLET | Refills: 0 | Status: SHIPPED | OUTPATIENT
Start: 2024-02-14

## 2024-04-04 ENCOUNTER — TELEPHONE (OUTPATIENT)
Dept: CARDIOLOGY CLINIC | Age: 79
End: 2024-04-04

## 2024-04-04 NOTE — TELEPHONE ENCOUNTER
PT's son would like a call to coordinate a plan for his dad's next transmission reading.  He will be out of town when it's time to complete this so he would like to figure out a plan before hand.  Son's name is Benito.  Phone number is [phone]

## 2024-04-04 NOTE — TELEPHONE ENCOUNTER
Spoke with pt's son, he states he will be on vacation 4/28-5/16 and would like the next remote check to be after he gets back if possible

## 2024-04-05 NOTE — TELEPHONE ENCOUNTER
Noted. Remote appt scheduled 5/21 after patient's son has returned. It would also be helpful if he can send a transmission in the next week to 10 days (prior to vacation) to keep a close eye on the battery d/t ASHLEY approaching.

## 2024-05-06 RX ORDER — RIVAROXABAN 20 MG/1
20 TABLET, FILM COATED ORAL DAILY
Qty: 90 TABLET | Refills: 3 | Status: SHIPPED | OUTPATIENT
Start: 2024-05-06

## 2024-05-06 NOTE — TELEPHONE ENCOUNTER
Last OV: 10/18/23  Next OV: X due yearly  Last refill: 12/12/23  #30  5 R/F  Most recent Labs: 8/1/23  Last EKG (if needed): 10/18/23

## 2024-05-29 ENCOUNTER — TELEPHONE (OUTPATIENT)
Dept: CARDIOLOGY CLINIC | Age: 79
End: 2024-05-29

## 2024-05-29 DIAGNOSIS — Z95.0 PACEMAKER: Primary | ICD-10-CM

## 2024-05-29 DIAGNOSIS — I45.9 HB (HEART BLOCK): ICD-10-CM

## 2024-05-29 DIAGNOSIS — I49.5 SSS (SICK SINUS SYNDROME) (HCC): ICD-10-CM

## 2024-05-29 NOTE — TELEPHONE ENCOUNTER
Spoke to pt's wife and informed device has reached replacement time and to be expecting a call in regards to the procedure. V/u.

## 2024-05-29 NOTE — TELEPHONE ENCOUNTER
Patient's Micra pacemaker reached RRT 05.12.24; 98.6% . Report sent to [facility] for review via Murj. Please see Murj for additional details.

## 2024-05-29 NOTE — TELEPHONE ENCOUNTER
Please reach out to patient and schedule implantation of Medtronic Micra Pacemaker  with Dr. Carballo at [facility].    Anesthesia is NOT needed   Include Medtronic rep in email [email or web address]    Implantation of Medtronic Micra pacemaker Pre procedure Instructions  Has current Micra in place (this does not get removed)    Date:______________________________    Arrive at:__________________________    Procedure time:____________________    The morning of your procedure you will park in the hospital parking lot and report directly to the cath lab to check in.   At the information desk stay right and go all the way to the end of the torrez, this will take you directly to your check in desk for the cath lab.    Pre-Procedure Instructions   You will need to fast (nothing to eat or drink) after midnight the day of your procedure  Do NOT chew gum or eat mints the day of your procedure  You will need to hold your Xarelto for 3 days prior to the procedure.  Do NOT take any medications the morning of the procedure.  Do not use any lotions, creams or perfume the morning of procedure.   You will need to complete pre-procedure lab work 3-5 days prior to your procedure.  Please have a responsible adult to drive you home after procedure, you should go home same day, but there is always a possibility of an overnight stay.  Cath lab will provide you with all post procedure instructions                                          [facility]     [facility]  [address]  [address]   [place] [address]  Phone: [phone]  The hours are Mon -Fri.  6:30 am - 4:00 pm   Saturday 8:00 am - noon  No appointment necessary

## 2024-06-05 DIAGNOSIS — I10 ESSENTIAL HYPERTENSION: ICD-10-CM

## 2024-06-05 DIAGNOSIS — I10 BENIGN HYPERTENSION: ICD-10-CM

## 2024-06-05 PROBLEM — I49.5 SSS (SICK SINUS SYNDROME) (HCC): Status: ACTIVE | Noted: 2024-05-29

## 2024-06-05 RX ORDER — AMLODIPINE BESYLATE 5 MG/1
5 TABLET ORAL DAILY
Qty: 90 TABLET | Refills: 0 | Status: SHIPPED | OUTPATIENT
Start: 2024-06-05

## 2024-06-05 NOTE — TELEPHONE ENCOUNTER
LVM for pt to return call to schedule procedure.     Implantation of Medtronic Micra pacemaker Pre procedure Instructions  Has current Micra in place (this does not get removed)     Date:      Arrive at:   Procedure time:      The morning of your procedure you will park in the hospital parking lot and report directly to the cath lab to check in.   At the information desk stay right and go all the way to the end of the torrez, this will take you directly to your check in desk for the cath lab.     Pre-Procedure Instructions   You will need to fast (nothing to eat or drink) after midnight the day of your procedure  Do NOT chew gum or eat mints the day of your procedure  You will need to hold your Xarelto for 3 days prior to the procedure.  Do NOT take any medications the morning of the procedure.  Do not use any lotions, creams or perfume the morning of procedure.   You will need to complete pre-procedure lab work 3-5 days prior to your procedure.  Please have a responsible adult to drive you home after procedure, you should go home same day, but there is always a possibility of an overnight stay.  Cath lab will provide you with all post procedure instructions                                             [facility]     [facility]  [address]  [address]   [place] [address]  Phone: [phone]  The hours are Mon -Fri.  6:30 am - 4:00 pm   Saturday 8:00 am - noon  No appointment necessary

## 2024-06-05 NOTE — TELEPHONE ENCOUNTER
Spoke with the pt spouse got him scheduled. She asked me to call son and give all instructions. He is signing up for [employer] as well. We went over instructions below and he verbalized understanding as did his mom.     Implantation of Medtronic Micra pacemaker Pre procedure Instructions  Has current Micra in place (this does not get removed)     Date: 7/22/24     Arrive at: 9:00 am   Procedure time: 10:30 am      The morning of your procedure you will park in the hospital parking lot and report directly to the cath lab to check in.   At the information desk stay right and go all the way to the end of the torrez, this will take you directly to your check in desk for the cath lab.     Pre-Procedure Instructions   You will need to fast (nothing to eat or drink) after midnight the day of your procedure  Do NOT chew gum or eat mints the day of your procedure  You will need to hold your Xarelto for 3 days prior to the procedure.  Do NOT take any medications the morning of the procedure.  Do not use any lotions, creams or perfume the morning of procedure.   You will need to complete pre-procedure lab work 3-5 days prior to your procedure.  Please have a responsible adult to drive you home after procedure, you should go home same day, but there is always a possibility of an overnight stay.  Cath lab will provide you with all post procedure instructions                                             [facility]     [facility]  [address]  [address]   [place] [address]  Phone: [phone]  The hours are Mon -Fri.  6:30 am - 4:00 pm   Saturday 8:00 am - noon  No appointment necessary         Qgenda update / emailed cath lab

## 2024-06-10 ENCOUNTER — TELEPHONE (OUTPATIENT)
Dept: CARDIOLOGY CLINIC | Age: 79
End: 2024-06-10

## 2024-06-10 NOTE — TELEPHONE ENCOUNTER
Pt seen dentist recently and had a tooth removed and they discovered another cavity. Pt's son called in to see if this will affect the future procedure. Spoke with pt's son, he states the cavity is not bothering him and there is no plan to remove or do anything with it at this time. Please advise if ok to proceed with pacemaker procedure in July.     Pt has a PPM insert procedure scheduled for 7/22/24. Pt is on Xarelto 20mg QD.

## 2024-06-10 NOTE — TELEPHONE ENCOUNTER
Pt had a tooth pulled recently and in doing so they found another cavit. He wants to make sure won't change his procedure on 7/22. Please call him and advise. I told him I didn't think so as long as he doesn't stop his blood thinner.    Benito - [phone]

## 2024-06-25 ENCOUNTER — TELEPHONE (OUTPATIENT)
Dept: CARDIOLOGY CLINIC | Age: 79
End: 2024-06-25

## 2024-06-25 NOTE — TELEPHONE ENCOUNTER
Pt's son Benito called in stating pt is supposed to have a Biopsy done on the top of his head on 8/25/24. Pt's dermatologist is requesting to stop Xarelto the day before and is wanting to know if pt should be on an antibiotic for this procedure?    Has PPM insert scheduled for 7/22/24    Hx of Chronic AF, CAD, essential HTN, Hypercholesterolemia, PPM. Meds: Xarelto, Atorvastatin, Lisnopril, Amlodipine

## 2024-06-25 NOTE — TELEPHONE ENCOUNTER
Called and spoke with pts wife since he is [place] and explained may hold Xarelto day before procedure and no need for ATB per Dr Carballo    Wife verbalized understanding     Explained if Dermatologist needs a letter to please call and we can fax     Verbalized understanding

## 2024-07-17 DIAGNOSIS — I45.9 HB (HEART BLOCK): ICD-10-CM

## 2024-07-17 DIAGNOSIS — Z95.0 PACEMAKER: ICD-10-CM

## 2024-07-17 LAB
ANION GAP SERPL CALCULATED.3IONS-SCNC: 13 MMOL/L (ref 3–16)
BUN SERPL-MCNC: 11 MG/DL (ref 7–20)
CALCIUM SERPL-MCNC: 10.4 MG/DL (ref 8.3–10.6)
CHLORIDE SERPL-SCNC: 101 MMOL/L (ref 99–110)
CO2 SERPL-SCNC: 26 MMOL/L (ref 21–32)
CREAT SERPL-MCNC: 1.3 MG/DL (ref 0.8–1.3)
DEPRECATED RDW RBC AUTO: 18.1 % (ref 12.4–15.4)
GFR SERPLBLD CREATININE-BSD FMLA CKD-EPI: 56 ML/MIN/{1.73_M2}
GLUCOSE SERPL-MCNC: 104 MG/DL (ref 70–99)
HCT VFR BLD AUTO: 36.7 % (ref 40.5–52.5)
HGB BLD-MCNC: 12.2 G/DL (ref 13.5–17.5)
MCH RBC QN AUTO: 35 PG (ref 26–34)
MCHC RBC AUTO-ENTMCNC: 33.4 G/DL (ref 31–36)
MCV RBC AUTO: 104.9 FL (ref 80–100)
PLATELET # BLD AUTO: 632 K/UL (ref 135–450)
PMV BLD AUTO: 8.7 FL (ref 5–10.5)
POTASSIUM SERPL-SCNC: 4.5 MMOL/L (ref 3.5–5.1)
RBC # BLD AUTO: 3.5 M/UL (ref 4.2–5.9)
SODIUM SERPL-SCNC: 140 MMOL/L (ref 136–145)
WBC # BLD AUTO: 17 K/UL (ref 4–11)

## 2024-07-19 ENCOUNTER — TELEPHONE (OUTPATIENT)
Dept: CARDIOLOGY CLINIC | Age: 79
End: 2024-07-19

## 2024-07-19 NOTE — TELEPHONE ENCOUNTER
LVM - for pt and family procedure time changed slightly. We had urgent case needing to be added in and moved it up slightly.  I asked the pt or family return call. Benito answered his cell number and agreed to new time.     Implantation of Medtronic Micra pacemaker Pre procedure Instructions  Has current Micra in place (this does not get removed)     Date: 7/22/24     Arrive at: 8:00 am   Procedure time: 9:30 am     Qgenda updated / updated emaile/call to cath lab

## 2024-07-21 ENCOUNTER — ANESTHESIA EVENT (OUTPATIENT)
Age: 79
End: 2024-07-21
Payer: MEDICARE

## 2024-07-22 ENCOUNTER — APPOINTMENT (OUTPATIENT)
Dept: GENERAL RADIOLOGY | Age: 79
End: 2024-07-22
Attending: INTERNAL MEDICINE
Payer: MEDICARE

## 2024-07-22 ENCOUNTER — HOSPITAL ENCOUNTER (OUTPATIENT)
Age: 79
Setting detail: OUTPATIENT SURGERY
Discharge: HOME | End: 2024-07-22
Attending: INTERNAL MEDICINE | Admitting: INTERNAL MEDICINE
Payer: MEDICARE

## 2024-07-22 ENCOUNTER — ANESTHESIA (OUTPATIENT)
Age: 79
End: 2024-07-22
Payer: MEDICARE

## 2024-07-22 VITALS
DIASTOLIC BLOOD PRESSURE: 74 MMHG | WEIGHT: 203 LBS | BODY MASS INDEX: 26.05 KG/M2 | SYSTOLIC BLOOD PRESSURE: 100 MMHG | HEART RATE: 60 BPM | RESPIRATION RATE: 16 BRPM | OXYGEN SATURATION: 96 % | TEMPERATURE: 97.6 F | HEIGHT: 74 IN

## 2024-07-22 DIAGNOSIS — I10 BENIGN HYPERTENSION: ICD-10-CM

## 2024-07-22 DIAGNOSIS — I10 ESSENTIAL HYPERTENSION: ICD-10-CM

## 2024-07-22 DIAGNOSIS — I49.5 SSS (SICK SINUS SYNDROME) (HCC): ICD-10-CM

## 2024-07-22 LAB
ABO + RH BLD: NORMAL
BLD GP AB SCN SERPL QL: NORMAL
DEPRECATED RDW RBC AUTO: 17.3 % (ref 12.4–15.4)
ECHO BSA: 2.19 M2
EKG ATRIAL RATE: 340 BPM
EKG ATRIAL RATE: 394 BPM
EKG DIAGNOSIS: NORMAL
EKG DIAGNOSIS: NORMAL
EKG Q-T INTERVAL: 448 MS
EKG Q-T INTERVAL: 500 MS
EKG QRS DURATION: 140 MS
EKG QRS DURATION: 154 MS
EKG QTC CALCULATION (BAZETT): 490 MS
EKG QTC CALCULATION (BAZETT): 500 MS
EKG R AXIS: 23 DEGREES
EKG R AXIS: 96 DEGREES
EKG T AXIS: -2 DEGREES
EKG T AXIS: -58 DEGREES
EKG VENTRICULAR RATE: 60 BPM
EKG VENTRICULAR RATE: 72 BPM
HCT VFR BLD AUTO: 36.4 % (ref 40.5–52.5)
HGB BLD-MCNC: 12.6 G/DL (ref 13.5–17.5)
MCH RBC QN AUTO: 34.8 PG (ref 26–34)
MCHC RBC AUTO-ENTMCNC: 34.7 G/DL (ref 31–36)
MCV RBC AUTO: 100.3 FL (ref 80–100)
PLATELET # BLD AUTO: 633 K/UL (ref 135–450)
PMV BLD AUTO: 7.6 FL (ref 5–10.5)
RBC # BLD AUTO: 3.63 M/UL (ref 4.2–5.9)
WBC # BLD AUTO: 17.5 K/UL (ref 4–11)

## 2024-07-22 PROCEDURE — 99152 MOD SED SAME PHYS/QHP 5/>YRS: CPT | Performed by: INTERNAL MEDICINE

## 2024-07-22 PROCEDURE — 85027 COMPLETE CBC AUTOMATED: CPT

## 2024-07-22 PROCEDURE — C1786 PMKR, SINGLE, RATE-RESP: HCPCS | Performed by: INTERNAL MEDICINE

## 2024-07-22 PROCEDURE — 93010 ELECTROCARDIOGRAM REPORT: CPT | Performed by: INTERNAL MEDICINE

## 2024-07-22 PROCEDURE — 71046 X-RAY EXAM CHEST 2 VIEWS: CPT

## 2024-07-22 PROCEDURE — 86850 RBC ANTIBODY SCREEN: CPT

## 2024-07-22 PROCEDURE — C1769 GUIDE WIRE: HCPCS | Performed by: INTERNAL MEDICINE

## 2024-07-22 PROCEDURE — 33207 INSERT HEART PM VENTRICULAR: CPT | Performed by: INTERNAL MEDICINE

## 2024-07-22 PROCEDURE — 6360000002 HC RX W HCPCS: Performed by: INTERNAL MEDICINE

## 2024-07-22 PROCEDURE — 93005 ELECTROCARDIOGRAM TRACING: CPT | Performed by: INTERNAL MEDICINE

## 2024-07-22 PROCEDURE — 7100000010 HC PHASE II RECOVERY - FIRST 15 MIN: Performed by: INTERNAL MEDICINE

## 2024-07-22 PROCEDURE — 2580000003 HC RX 258: Performed by: INTERNAL MEDICINE

## 2024-07-22 PROCEDURE — C1898 LEAD, PMKR, OTHER THAN TRANS: HCPCS | Performed by: INTERNAL MEDICINE

## 2024-07-22 PROCEDURE — 7100000011 HC PHASE II RECOVERY - ADDTL 15 MIN: Performed by: INTERNAL MEDICINE

## 2024-07-22 PROCEDURE — 6360000004 HC RX CONTRAST MEDICATION: Performed by: INTERNAL MEDICINE

## 2024-07-22 PROCEDURE — C1892 INTRO/SHEATH,FIXED,PEEL-AWAY: HCPCS | Performed by: INTERNAL MEDICINE

## 2024-07-22 PROCEDURE — 86900 BLOOD TYPING SEROLOGIC ABO: CPT

## 2024-07-22 PROCEDURE — C1887 CATHETER, GUIDING: HCPCS | Performed by: INTERNAL MEDICINE

## 2024-07-22 PROCEDURE — 99153 MOD SED SAME PHYS/QHP EA: CPT | Performed by: INTERNAL MEDICINE

## 2024-07-22 PROCEDURE — 86901 BLOOD TYPING SEROLOGIC RH(D): CPT

## 2024-07-22 DEVICE — LEAD 3830 US MKT/ 69CM MRI LBBAP
Type: IMPLANTABLE DEVICE | Status: FUNCTIONAL
Brand: SELECTSECURE™ MRI SURESCAN™

## 2024-07-22 DEVICE — IPG W1SR01 AZURE XT SR MRI USA
Type: IMPLANTABLE DEVICE | Status: FUNCTIONAL
Brand: AZURE™ XT SR MRI SURESCAN™

## 2024-07-22 RX ORDER — SODIUM CHLORIDE 9 MG/ML
INJECTION, SOLUTION INTRAVENOUS PRN
Status: DISCONTINUED | OUTPATIENT
Start: 2024-07-22 | End: 2024-07-22 | Stop reason: HOSPADM

## 2024-07-22 RX ORDER — MIDAZOLAM HYDROCHLORIDE 1 MG/ML
INJECTION INTRAMUSCULAR; INTRAVENOUS PRN
Status: DISCONTINUED | OUTPATIENT
Start: 2024-07-22 | End: 2024-07-22 | Stop reason: HOSPADM

## 2024-07-22 RX ORDER — BUPIVACAINE HYDROCHLORIDE 5 MG/ML
INJECTION, SOLUTION EPIDURAL; INTRACAUDAL PRN
Status: DISCONTINUED | OUTPATIENT
Start: 2024-07-22 | End: 2024-07-22 | Stop reason: HOSPADM

## 2024-07-22 RX ORDER — SODIUM CHLORIDE 0.9 % (FLUSH) 0.9 %
5-40 SYRINGE (ML) INJECTION EVERY 12 HOURS SCHEDULED
Status: DISCONTINUED | OUTPATIENT
Start: 2024-07-22 | End: 2024-07-22 | Stop reason: HOSPADM

## 2024-07-22 RX ORDER — CEFAZOLIN SODIUM 1 G/3ML
INJECTION, POWDER, FOR SOLUTION INTRAMUSCULAR; INTRAVENOUS PRN
Status: DISCONTINUED | OUTPATIENT
Start: 2024-07-22 | End: 2024-07-22 | Stop reason: HOSPADM

## 2024-07-22 RX ORDER — AMLODIPINE BESYLATE 5 MG/1
5 TABLET ORAL DAILY
Qty: 90 TABLET | Refills: 0 | Status: SHIPPED | OUTPATIENT
Start: 2024-07-22

## 2024-07-22 RX ORDER — SODIUM CHLORIDE 0.9 % (FLUSH) 0.9 %
5-40 SYRINGE (ML) INJECTION PRN
Status: DISCONTINUED | OUTPATIENT
Start: 2024-07-22 | End: 2024-07-22 | Stop reason: HOSPADM

## 2024-07-22 RX ADMIN — Medication 10 ML: at 08:08

## 2024-07-22 RX ADMIN — SODIUM CHLORIDE: 9 INJECTION, SOLUTION INTRAVENOUS at 08:08

## 2024-07-22 NOTE — FLOWSHEET NOTE
Patient received post procedure in stable condition.   Post-cath handoff/site assessment performed to left subclavian. Site unremarkable   Pt is drowsy but stable. No distress noted. VSS-see flowsheet.   Post device restrictions/instructions provided  Patient offered a snack/drink.   No further needs at this time, call light within reach.   MD to talk to patient and family soon.       Electronically signed by Catie Lyon RN on 7/22/2024 at 10:24 AM

## 2024-07-22 NOTE — DISCHARGE INSTRUCTIONS
Pacemaker/ICD/BiV Discharge Instructions          Activity:           ? Do not lift greater than 10 lbs with left arm for one month.            ? Do not raise the arm on the side the device was implanted over your head for              one month.            ? No driving for 48 hours.           ? Sling and swathe to affected arm    These rules help to heal the implant site and stabilize the heart lead wires.     Nutrition:          Regular diet or as directed by your doctor.     Wound Care:   ? Do not get incision wet until after first appointment with clinic in 7-10 days.  Do not submerge your incision in any water       for seven days. (i.e. tub bath, swimming pool)   ? Keep the incision dry and clean.   ? Dressing should stay on until follow up appointment.     ? Do not remove the steri strips (strips of paper over the incision).  If they fall off  naturally do not reapply.    ? Do not rub or twist the device.     ? Avoid tight clothes over the incision.       Carry your card:  Carry your temporary pacer/ICD/BiV care with you until your permanent card arrives in four to six weeks.  An ID card should always be with you.    What symptoms or health problems do you need to look out for after you leave the hospital? Call your physician if you have any of these symptoms.    ? Increase swelling and/or tenderness in incision.   ? Redness of incision or drainage from incision.  ? If a suture works its way through the incision.  ? Fever, unexplained temperature greater than 100?     If your condition worsens or for any concerns, call your doctor or seek emergency medical services as needed.

## 2024-07-22 NOTE — H&P
System:  Pertinent positive and negatives are in the HPI, the rest are negative.     Physical Examination:  BP (!) 159/72   Pulse 64   Temp 97.6 °F (36.4 °C) (Infrared)   Ht 1.88 m (6' 2\")   Wt 92.1 kg (203 lb)   SpO2 98%   BMI 26.06 kg/m²      General: Calm and not in acute distress.   HEENT: Atraumatic normocephalic. Normal eye movements.   Neck: No JVP.  Cardiac: Regular  Lungs: Not labored.    Extremities. No pitting edema  Neurology: A&O x3.   Psychiatry: Normal affect and mood.    Labs:  Reviewed.     ECG: reviewed, atrial fibrillation with ventricular paced rhythm at 60 bpm,   QRS duration 154 ms.     Studies:   1. Event monitor:     2. Echo:     3. Stress Test:      4. Cath:     I independently reviewed the ECG, MCOT, echocardiogram, stress test, and coronary angiography/PCI results and used them for my plan of care.      EP Procedures:  6/20/18 (Medronic Micro Leadless).    Assessment/Plan:   Permanent atrial fibrillation,  Symptomatic bradycardia s/p Leadless pacemaker which is now at ASHLEY. Chronically pacing at 99%.     Here for single chamber intravenous pacemaker.     The risks and benefits of the procedure were reviewed. These include and are not limited to bleeding, infection, pneumothorax, radiation exposure,renal dysfunction/failure from contrast, cardiac perforation with tamponade requiring emergent pericardiocentesis and rarely open heart surgery, fatal arrhythmias and death.    He wishes to proceed.     ASA III  Mallampati II    Thank you for allowing me to participate in the care of Maxx Norman. All questions and concerns were addressed to the patient/family. Alternatives to my treatment were discussed.     Shankar Carballo MD  Cardiac Electrophysiology  [facility]

## 2024-07-22 NOTE — FLOWSHEET NOTE
Xray read, okay to proceed with discharge. Discharge instructions reviewed with patient and son. All questions answered. PIV removed; dressing applied to site. Follow-up appointments reviewed. Sling and swathe applied to left arm.  Patient ready for discharge. Patient discharged via wheelchair with belongings.     Electronically signed by Catie Lyon RN on 7/22/2024 at 12:05 PM

## 2024-07-22 NOTE — FLOWSHEET NOTE
Juanito, Medtronic device rep, at bedside providing education.     Electronically signed by Catie Lyon RN on 7/22/2024 at 10:27 AM

## 2024-07-29 ENCOUNTER — NURSE ONLY (OUTPATIENT)
Dept: CARDIOLOGY CLINIC | Age: 79
End: 2024-07-29

## 2024-07-29 DIAGNOSIS — R00.1 BRADYCARDIA WITH 31-40 BEATS PER MINUTE: ICD-10-CM

## 2024-07-29 DIAGNOSIS — Z95.0 CARDIAC PACEMAKER IN SITU: Primary | ICD-10-CM

## 2024-07-29 DIAGNOSIS — I49.5 SSS (SICK SINUS SYNDROME) (HCC): ICD-10-CM

## 2024-09-22 DIAGNOSIS — I10 ESSENTIAL HYPERTENSION: ICD-10-CM

## 2024-09-22 DIAGNOSIS — I10 BENIGN HYPERTENSION: ICD-10-CM

## 2024-09-23 RX ORDER — AMLODIPINE BESYLATE 5 MG/1
5 TABLET ORAL DAILY
Qty: 90 TABLET | Refills: 0 | Status: SHIPPED | OUTPATIENT
Start: 2024-09-23

## 2024-10-22 NOTE — PROGRESS NOTES
Cardiac Electrophysiology Consultation   Date: 10/23/2024   Reason for Consultation: Symptomatic Bradycardia   Consult Requesting Physician: Michelle Humphries MD     Chief Complaint:   Chief Complaint   Patient presents with    Follow-up     PT reports no new cardiac concerns at this time.        HPI: Maxx Norman is a 78 y.o. patient with a history of HLD, HTN, CAD s/p stents to the LAD in 2008; New onset AF in 7/27/16 . Later developed symptomatic bradycardia, experienced falls, dizziness, lightheadedness. He underwent PPM implant on 6/20/18 (Medronic Micra Leadless).    Micra device reached RRT and underwent implantation of single PPM with LBB pacing lead 7/22/24.     Interval History:   Today, he presents to office for follow up s/p PPM.  He is compliant with his medications and tolerating them well. He denies chest pain/pressure, tightness, edema, shortness of breath, heart racing, palpitations, lightheadedness, dizziness, syncope, presyncope,  PND or orthopnea.       Past Medical History:   Diagnosis Date    Atrial fibrillation (HCC)     Basal cell carcinoma     right ear and right cheek    Bilateral tinnitus 2005    Bradycardia     required pacemaker     CAD (coronary artery disease)     4 stents placed     GERD (gastroesophageal reflux disease)     Hyperlipidemia     Hypertension     Osteoarthritis       Past Surgical History:   Procedure Laterality Date    A-V CARDIAC PACEMAKER INSERTION      bradycardia     CORONARY ANGIOPLASTY WITH STENT PLACEMENT  2008    EP DEVICE PROCEDURE N/A 7/22/2024    Insert PPM dual performed by Shankar Carballo MD at [facility] CARDIAC CATH [facility]    EXTERNAL EAR SURGERY Right     removed basal cell cancer    SKIN CANCER DESTRUCTION  03/2017    Right cheek       Allergies:  Allergies   Allergen Reactions    Bee Venom Anaphylaxis       Medication:   Prior to Admission medications    Medication Sig Start Date End Date Taking? Authorizing Provider   amLODIPine (NORVASC) 5 MG tablet TAKE 
negatives are in the HPI, the rest are negative.     Physical Examination:  There were no vitals taken for this visit.     General: Calm and not in acute distress.   HEENT: Atraumatic normocephalic. Normal eye movements.   Neck: No JVP.  Cardiac: ***RRR, no appreciable murmurs.   Lungs: Not labored. Clear to ausculation.   Extremities. No pitting edema  Neurology: A&O x3.   Psychiatry: Normal affect and mood.    Labs:  Reviewed.     ECG: reviewed, ***Sinus  rhythm with v-rate of *** bpm with QRS duration *** ms. No ventricular pre-excitation, or QT prolongation.     Studies:   1. Event monitor:     2. Echo: 8/2/16   Summary   Concentric LVH with normal systolic function. EF is    60%   The left atrium is at the upper limits of normal in size.   Trivial mitral, aortic and tricuspid regurgitation is present.   Normal right ventricular size and function.    3. Stress Test:      4. Cath:     I independently reviewed the ECG, MCOT, echocardiogram, stress test, and coronary angiography/PCI results and used them for my plan of care.      EP Procedures:  S/p implantation of Micra PPM 6/20/18 (Dr. Hansen)  S/p successful implantation of a Medtronic single chamber (LBB) pacing lead 7/22/24    Assessment/Plan:   Symptomatic Bradycardia   - ECG today shows ***    - s/p Micra implant 6/20/18, reached RRT    - underwent single chamber PPM with LBB pacing lead 7/22/24   - Interrogation today shows:*** years remaining, AP ***% ,  ***%,  ***% AT/AF burden per device interrogation today, Underlying ***, presenting ***    Permanent Atrial Fibrillation    - Patient has a [id number] Score of *** (age2,htn,cad)   - Continue Xarelto for thromboembolic risk reduction     - CrCl *** mL/min per Cr of *** on ***    - Tolerating well with no excessive bruising or bleeding     HTN  - Controlled/Not well controlled***  - BP goal <130/80  - Home BP monitoring encouraged, printed information provided on how to accurately measure BP at home.  -

## 2024-10-23 ENCOUNTER — OFFICE VISIT (OUTPATIENT)
Dept: CARDIOLOGY CLINIC | Age: 79
End: 2024-10-23

## 2024-10-23 VITALS
HEART RATE: 85 BPM | BODY MASS INDEX: 25.28 KG/M2 | HEIGHT: 74 IN | DIASTOLIC BLOOD PRESSURE: 70 MMHG | SYSTOLIC BLOOD PRESSURE: 130 MMHG | OXYGEN SATURATION: 95 % | WEIGHT: 197 LBS

## 2024-10-23 DIAGNOSIS — I10 BENIGN HYPERTENSION: ICD-10-CM

## 2024-10-23 DIAGNOSIS — I10 ESSENTIAL HYPERTENSION: Primary | ICD-10-CM

## 2024-10-23 RX ORDER — AMLODIPINE BESYLATE 5 MG/1
5 TABLET ORAL DAILY
Qty: 90 TABLET | Refills: 3 | Status: SHIPPED | OUTPATIENT
Start: 2024-10-23

## 2024-10-23 NOTE — PATIENT INSTRUCTIONS
Medications can be purchase at lower cost from [place] pharmacies.  You will first need to set up an account.   Go to [facility] website  [email or web address]  [place] to create a new account.  Once the account has been created you will be given an account number.   Call our office at [phone] or send message through [employer] to update us with your account number and we will fax prescription to be filled under your account

## 2024-12-09 ENCOUNTER — NURSE ONLY (OUTPATIENT)
Dept: FAMILY MEDICINE CLINIC | Age: 79
End: 2024-12-09
Payer: MEDICARE

## 2024-12-09 DIAGNOSIS — Z23 NEEDS FLU SHOT: Primary | ICD-10-CM

## 2024-12-09 PROCEDURE — 90661 CCIIV3 VAC ABX FR 0.5 ML IM: CPT | Performed by: INTERNAL MEDICINE

## 2024-12-09 PROCEDURE — G0008 ADMIN INFLUENZA VIRUS VAC: HCPCS | Performed by: INTERNAL MEDICINE

## 2024-12-19 RX ORDER — LISINOPRIL 40 MG/1
40 TABLET ORAL DAILY
Qty: 90 TABLET | Refills: 0 | Status: SHIPPED | OUTPATIENT
Start: 2024-12-19

## 2024-12-19 RX ORDER — ATORVASTATIN CALCIUM 40 MG/1
TABLET, FILM COATED ORAL
Qty: 90 TABLET | Refills: 0 | Status: SHIPPED | OUTPATIENT
Start: 2024-12-19

## 2025-02-27 ENCOUNTER — PATIENT MESSAGE (OUTPATIENT)
Dept: CARDIOLOGY CLINIC | Age: 80
End: 2025-02-27

## 2025-02-27 NOTE — TELEPHONE ENCOUNTER
Please call patient to further assess symptoms.   Is the patient having any sob or weight gain of more than 2-3 lbs in 1 day or more than 5 lbs in a week?  Does anything make the swelling worse or better?  Has the patient used any conservative therapy including compression, elevation, or ambulation?      This patient has not been seen with Dr. Reyes in over a year, last office visit 10/18/2023, was to follow up in 1 year.   Please schedule the patient for an office visit with Dr. Reyes to further discuss medications.

## 2025-02-27 NOTE — TELEPHONE ENCOUNTER
Called/spoke to son Benito:    Is the patient having any sob or weight gain of more than 2-3 lbs in 1 day or more than 5 lbs in a week? Not sure. He doesn't weigh himself daily.  Does anything make the swelling worse or better? Swelling has been going on for a few months. PT cannot wear shoes because feet are excessively swollen.  Has the patient used any conservative therapy including compression, elevation, or ambulation? He does not elevate or wear compression stockings.    Dr. Reyes does not have any open office visits before for a few weeks. Can a RN look at his schedule to see if PT can be added? I advised Benito that we will follow up with him soon with an available O/V time and date. [place] V/U.

## 2025-02-28 NOTE — TELEPHONE ENCOUNTER
Can schedule with Eric at his next opening or see if there are any appointments with an NP?  Please advise patient utilize compression, elevation, and ambulation as tolerable   Thanks

## 2025-02-28 NOTE — TELEPHONE ENCOUNTER
Spoke with Benito, he wanted sooner appt than 3/21 with Eric. Scheduled with DESTINY Balderrama Monday 3/3.

## 2025-03-03 ENCOUNTER — OFFICE VISIT (OUTPATIENT)
Dept: CARDIOLOGY CLINIC | Age: 80
End: 2025-03-03
Payer: MEDICARE

## 2025-03-03 VITALS
OXYGEN SATURATION: 99 % | SYSTOLIC BLOOD PRESSURE: 132 MMHG | DIASTOLIC BLOOD PRESSURE: 74 MMHG | HEART RATE: 81 BPM | BODY MASS INDEX: 27.99 KG/M2 | WEIGHT: 218 LBS

## 2025-03-03 DIAGNOSIS — I48.21 PERMANENT ATRIAL FIBRILLATION (HCC): ICD-10-CM

## 2025-03-03 DIAGNOSIS — R60.0 LOCALIZED EDEMA: Primary | ICD-10-CM

## 2025-03-03 DIAGNOSIS — I50.20 SYSTOLIC CONGESTIVE HEART FAILURE, UNSPECIFIED HF CHRONICITY (HCC): ICD-10-CM

## 2025-03-03 DIAGNOSIS — I48.91 ATRIAL FIBRILLATION WITH SLOW VENTRICULAR RESPONSE (HCC): ICD-10-CM

## 2025-03-03 DIAGNOSIS — I50.31 ACUTE DIASTOLIC CONGESTIVE HEART FAILURE, NYHA CLASS 3 (HCC): ICD-10-CM

## 2025-03-03 DIAGNOSIS — I25.10 CORONARY ARTERY DISEASE INVOLVING NATIVE CORONARY ARTERY OF NATIVE HEART WITHOUT ANGINA PECTORIS: ICD-10-CM

## 2025-03-03 PROCEDURE — G8419 CALC BMI OUT NRM PARAM NOF/U: HCPCS | Performed by: NURSE PRACTITIONER

## 2025-03-03 PROCEDURE — 99214 OFFICE O/P EST MOD 30 MIN: CPT | Performed by: NURSE PRACTITIONER

## 2025-03-03 PROCEDURE — 3075F SYST BP GE 130 - 139MM HG: CPT | Performed by: NURSE PRACTITIONER

## 2025-03-03 PROCEDURE — 1036F TOBACCO NON-USER: CPT | Performed by: NURSE PRACTITIONER

## 2025-03-03 PROCEDURE — 1123F ACP DISCUSS/DSCN MKR DOCD: CPT | Performed by: NURSE PRACTITIONER

## 2025-03-03 PROCEDURE — 93000 ELECTROCARDIOGRAM COMPLETE: CPT | Performed by: NURSE PRACTITIONER

## 2025-03-03 PROCEDURE — G8427 DOCREV CUR MEDS BY ELIG CLIN: HCPCS | Performed by: NURSE PRACTITIONER

## 2025-03-03 PROCEDURE — 1159F MED LIST DOCD IN RCRD: CPT | Performed by: NURSE PRACTITIONER

## 2025-03-03 PROCEDURE — 3078F DIAST BP <80 MM HG: CPT | Performed by: NURSE PRACTITIONER

## 2025-03-03 RX ORDER — FUROSEMIDE 40 MG/1
40 TABLET ORAL DAILY
Qty: 30 TABLET | Refills: 0 | Status: ON HOLD | OUTPATIENT
Start: 2025-03-03 | End: 2025-03-06 | Stop reason: HOSPADM

## 2025-03-03 NOTE — PROGRESS NOTES
Instructions:   Daily weight: Call for increase 3 lbs/day or 5 lbs/week.  2 gm sodium diet:  Fluid Restriction: 64 oz.      I appreciate the opportunity of cooperating in the care of this individual.    TATI Pereira - CNP, CNP, 3/3/2025,1:57 PM

## 2025-03-04 ENCOUNTER — TELEPHONE (OUTPATIENT)
Dept: CARDIOLOGY CLINIC | Age: 80
End: 2025-03-04

## 2025-03-04 ENCOUNTER — APPOINTMENT (OUTPATIENT)
Dept: GENERAL RADIOLOGY | Age: 80
DRG: 291 | End: 2025-03-04
Payer: MEDICARE

## 2025-03-04 ENCOUNTER — HOSPITAL ENCOUNTER (INPATIENT)
Age: 80
LOS: 2 days | Discharge: HOME | DRG: 291 | End: 2025-03-06
Attending: EMERGENCY MEDICINE | Admitting: STUDENT IN AN ORGANIZED HEALTH CARE EDUCATION/TRAINING PROGRAM
Payer: MEDICARE

## 2025-03-04 DIAGNOSIS — I50.33 ACUTE ON CHRONIC HEART FAILURE WITH PRESERVED EJECTION FRACTION (HCC): ICD-10-CM

## 2025-03-04 DIAGNOSIS — E87.1 HYPONATREMIA: ICD-10-CM

## 2025-03-04 DIAGNOSIS — E87.70 HYPERVOLEMIA, UNSPECIFIED HYPERVOLEMIA TYPE: Primary | ICD-10-CM

## 2025-03-04 DIAGNOSIS — R60.0 BILATERAL LOWER EXTREMITY EDEMA: ICD-10-CM

## 2025-03-04 LAB
ACANTHOCYTES BLD QL SMEAR: ABNORMAL
ALBUMIN SERPL-MCNC: 3.5 G/DL (ref 3.4–5)
ALBUMIN SERPL-MCNC: 3.7 G/DL (ref 3.4–5)
ALBUMIN/GLOB SERPL: 1.3 {RATIO} (ref 1.1–2.2)
ALBUMIN/GLOB SERPL: 1.8 {RATIO} (ref 1.1–2.2)
ALP SERPL-CCNC: 169 U/L (ref 40–129)
ALP SERPL-CCNC: 196 U/L (ref 40–129)
ALT SERPL-CCNC: 57 U/L (ref 10–40)
ALT SERPL-CCNC: 64 U/L (ref 10–40)
ANION GAP SERPL CALCULATED.3IONS-SCNC: 10 MMOL/L (ref 3–16)
ANION GAP SERPL CALCULATED.3IONS-SCNC: 9 MMOL/L (ref 3–16)
ANISOCYTOSIS BLD QL SMEAR: ABNORMAL
AST SERPL-CCNC: 116 U/L (ref 15–37)
AST SERPL-CCNC: 125 U/L (ref 15–37)
BASOPHILS # BLD: 0.3 K/UL (ref 0–0.2)
BASOPHILS NFR BLD: 1 %
BILIRUB SERPL-MCNC: 1.5 MG/DL (ref 0–1)
BILIRUB SERPL-MCNC: 1.8 MG/DL (ref 0–1)
BUN SERPL-MCNC: 10 MG/DL (ref 7–20)
BUN SERPL-MCNC: 11 MG/DL (ref 7–20)
CALCIUM SERPL-MCNC: 9.3 MG/DL (ref 8.3–10.6)
CALCIUM SERPL-MCNC: 9.4 MG/DL (ref 8.3–10.6)
CHLORIDE SERPL-SCNC: 86 MMOL/L (ref 99–110)
CHLORIDE SERPL-SCNC: 94 MMOL/L (ref 99–110)
CO2 SERPL-SCNC: 25 MMOL/L (ref 21–32)
CO2 SERPL-SCNC: 25 MMOL/L (ref 21–32)
CREAT SERPL-MCNC: 1 MG/DL (ref 0.8–1.3)
CREAT SERPL-MCNC: 1.1 MG/DL (ref 0.8–1.3)
DEPRECATED RDW RBC AUTO: 15.9 % (ref 12.4–15.4)
DEPRECATED RDW RBC AUTO: 16 % (ref 12.4–15.4)
EKG ATRIAL RATE: 258 BPM
EKG DIAGNOSIS: NORMAL
EKG Q-T INTERVAL: 412 MS
EKG QRS DURATION: 106 MS
EKG QTC CALCULATION (BAZETT): 469 MS
EKG R AXIS: 83 DEGREES
EKG T AXIS: -14 DEGREES
EKG VENTRICULAR RATE: 78 BPM
EOSINOPHIL # BLD: 0.5 K/UL (ref 0–0.6)
EOSINOPHIL NFR BLD: 2 %
GFR SERPLBLD CREATININE-BSD FMLA CKD-EPI: 68 ML/MIN/{1.73_M2}
GFR SERPLBLD CREATININE-BSD FMLA CKD-EPI: 76 ML/MIN/{1.73_M2}
GLUCOSE SERPL-MCNC: 101 MG/DL (ref 70–99)
GLUCOSE SERPL-MCNC: 117 MG/DL (ref 70–99)
HCT VFR BLD AUTO: 33.9 % (ref 40.5–52.5)
HCT VFR BLD AUTO: 35.6 % (ref 40.5–52.5)
HGB BLD-MCNC: 11.6 G/DL (ref 13.5–17.5)
HGB BLD-MCNC: 11.6 G/DL (ref 13.5–17.5)
LYMPHOCYTES # BLD: 3.8 K/UL (ref 1–5.1)
LYMPHOCYTES NFR BLD: 11 %
MCH RBC QN AUTO: 34.4 PG (ref 26–34)
MCH RBC QN AUTO: 35.2 PG (ref 26–34)
MCHC RBC AUTO-ENTMCNC: 32.5 G/DL (ref 31–36)
MCHC RBC AUTO-ENTMCNC: 34.2 G/DL (ref 31–36)
MCV RBC AUTO: 102.9 FL (ref 80–100)
MCV RBC AUTO: 105.9 FL (ref 80–100)
MONOCYTES # BLD: 1.5 K/UL (ref 0–1.3)
MONOCYTES NFR BLD: 6 %
MONONUC CELLS NFR BLD MANUAL: 2 %
NEUTROPHILS # BLD: 18.6 K/UL (ref 1.7–7.7)
NEUTROPHILS NFR BLD: 68 %
NEUTS BAND NFR BLD MANUAL: 6 % (ref 0–7)
NT-PROBNP SERPL-MCNC: 773 PG/ML (ref 0–449)
NT-PROBNP SERPL-MCNC: 896 PG/ML (ref 0–449)
PATH INTERP BLD-IMP: NORMAL
PATH INTERP BLD-IMP: YES
PLATELET # BLD AUTO: 836 K/UL (ref 135–450)
PLATELET # BLD AUTO: 847 K/UL (ref 135–450)
PLATELET BLD QL SMEAR: ABNORMAL
PMV BLD AUTO: 8.6 FL (ref 5–10.5)
PMV BLD AUTO: 9.2 FL (ref 5–10.5)
POIKILOCYTOSIS BLD QL SMEAR: ABNORMAL
POLYCHROMASIA BLD QL SMEAR: ABNORMAL
POTASSIUM SERPL-SCNC: 4.3 MMOL/L (ref 3.5–5.1)
POTASSIUM SERPL-SCNC: 4.7 MMOL/L (ref 3.5–5.1)
PROCALCITONIN SERPL IA-MCNC: 0.24 NG/ML (ref 0–0.15)
PROT SERPL-MCNC: 5.8 G/DL (ref 6.4–8.2)
PROT SERPL-MCNC: 6.3 G/DL (ref 6.4–8.2)
RBC # BLD AUTO: 3.3 M/UL (ref 4.2–5.9)
RBC # BLD AUTO: 3.36 M/UL (ref 4.2–5.9)
SCHISTOCYTES BLD QL SMEAR: ABNORMAL
SLIDE REVIEW: ABNORMAL
SMUDGE CELLS BLD QL SMEAR: PRESENT
SODIUM SERPL-SCNC: 120 MMOL/L (ref 136–145)
SODIUM SERPL-SCNC: 129 MMOL/L (ref 136–145)
TROPONIN, HIGH SENSITIVITY: 26 NG/L (ref 0–22)
VARIANT LYMPHS NFR BLD MANUAL: 4 % (ref 0–6)
WBC # BLD AUTO: 25.2 K/UL (ref 4–11)
WBC # BLD AUTO: 31.2 K/UL (ref 4–11)

## 2025-03-04 PROCEDURE — 93005 ELECTROCARDIOGRAM TRACING: CPT | Performed by: EMERGENCY MEDICINE

## 2025-03-04 PROCEDURE — 85025 COMPLETE CBC W/AUTO DIFF WBC: CPT

## 2025-03-04 PROCEDURE — 6370000000 HC RX 637 (ALT 250 FOR IP): Performed by: STUDENT IN AN ORGANIZED HEALTH CARE EDUCATION/TRAINING PROGRAM

## 2025-03-04 PROCEDURE — 93010 ELECTROCARDIOGRAM REPORT: CPT | Performed by: INTERNAL MEDICINE

## 2025-03-04 PROCEDURE — 84484 ASSAY OF TROPONIN QUANT: CPT

## 2025-03-04 PROCEDURE — 2500000003 HC RX 250 WO HCPCS: Performed by: STUDENT IN AN ORGANIZED HEALTH CARE EDUCATION/TRAINING PROGRAM

## 2025-03-04 PROCEDURE — 99285 EMERGENCY DEPT VISIT HI MDM: CPT

## 2025-03-04 PROCEDURE — 6360000002 HC RX W HCPCS

## 2025-03-04 PROCEDURE — 71045 X-RAY EXAM CHEST 1 VIEW: CPT

## 2025-03-04 PROCEDURE — 83880 ASSAY OF NATRIURETIC PEPTIDE: CPT

## 2025-03-04 PROCEDURE — 1200000000 HC SEMI PRIVATE

## 2025-03-04 PROCEDURE — 80053 COMPREHEN METABOLIC PANEL: CPT

## 2025-03-04 PROCEDURE — 84145 PROCALCITONIN (PCT): CPT

## 2025-03-04 RX ORDER — ATORVASTATIN CALCIUM 40 MG/1
40 TABLET, FILM COATED ORAL NIGHTLY
Status: DISCONTINUED | OUTPATIENT
Start: 2025-03-04 | End: 2025-03-05

## 2025-03-04 RX ORDER — ACETAMINOPHEN 325 MG/1
650 TABLET ORAL EVERY 6 HOURS PRN
Status: DISCONTINUED | OUTPATIENT
Start: 2025-03-04 | End: 2025-03-06 | Stop reason: HOSPADM

## 2025-03-04 RX ORDER — POTASSIUM CHLORIDE 7.45 MG/ML
10 INJECTION INTRAVENOUS PRN
Status: DISCONTINUED | OUTPATIENT
Start: 2025-03-04 | End: 2025-03-06 | Stop reason: HOSPADM

## 2025-03-04 RX ORDER — SODIUM CHLORIDE 9 MG/ML
INJECTION, SOLUTION INTRAVENOUS PRN
Status: DISCONTINUED | OUTPATIENT
Start: 2025-03-04 | End: 2025-03-06 | Stop reason: HOSPADM

## 2025-03-04 RX ORDER — FUROSEMIDE 10 MG/ML
40 INJECTION INTRAMUSCULAR; INTRAVENOUS 2 TIMES DAILY
Status: DISCONTINUED | OUTPATIENT
Start: 2025-03-05 | End: 2025-03-06 | Stop reason: HOSPADM

## 2025-03-04 RX ORDER — ACETAMINOPHEN 650 MG/1
650 SUPPOSITORY RECTAL EVERY 6 HOURS PRN
Status: DISCONTINUED | OUTPATIENT
Start: 2025-03-04 | End: 2025-03-06 | Stop reason: HOSPADM

## 2025-03-04 RX ORDER — POLYETHYLENE GLYCOL 3350 17 G/17G
17 POWDER, FOR SOLUTION ORAL DAILY PRN
Status: DISCONTINUED | OUTPATIENT
Start: 2025-03-04 | End: 2025-03-06 | Stop reason: HOSPADM

## 2025-03-04 RX ORDER — ONDANSETRON 4 MG/1
4 TABLET, ORALLY DISINTEGRATING ORAL EVERY 8 HOURS PRN
Status: DISCONTINUED | OUTPATIENT
Start: 2025-03-04 | End: 2025-03-06 | Stop reason: HOSPADM

## 2025-03-04 RX ORDER — MAGNESIUM SULFATE IN WATER 40 MG/ML
2000 INJECTION, SOLUTION INTRAVENOUS PRN
Status: DISCONTINUED | OUTPATIENT
Start: 2025-03-04 | End: 2025-03-06 | Stop reason: HOSPADM

## 2025-03-04 RX ORDER — ONDANSETRON 2 MG/ML
4 INJECTION INTRAMUSCULAR; INTRAVENOUS EVERY 6 HOURS PRN
Status: DISCONTINUED | OUTPATIENT
Start: 2025-03-04 | End: 2025-03-06 | Stop reason: HOSPADM

## 2025-03-04 RX ORDER — LISINOPRIL 40 MG/1
40 TABLET ORAL DAILY
Status: DISCONTINUED | OUTPATIENT
Start: 2025-03-05 | End: 2025-03-06 | Stop reason: HOSPADM

## 2025-03-04 RX ORDER — SODIUM CHLORIDE 0.9 % (FLUSH) 0.9 %
5-40 SYRINGE (ML) INJECTION PRN
Status: DISCONTINUED | OUTPATIENT
Start: 2025-03-04 | End: 2025-03-06 | Stop reason: HOSPADM

## 2025-03-04 RX ORDER — FUROSEMIDE 10 MG/ML
20 INJECTION INTRAMUSCULAR; INTRAVENOUS ONCE
Status: COMPLETED | OUTPATIENT
Start: 2025-03-04 | End: 2025-03-04

## 2025-03-04 RX ORDER — POTASSIUM CHLORIDE 1500 MG/1
40 TABLET, EXTENDED RELEASE ORAL PRN
Status: DISCONTINUED | OUTPATIENT
Start: 2025-03-04 | End: 2025-03-06 | Stop reason: HOSPADM

## 2025-03-04 RX ORDER — SODIUM CHLORIDE 0.9 % (FLUSH) 0.9 %
5-40 SYRINGE (ML) INJECTION EVERY 12 HOURS SCHEDULED
Status: DISCONTINUED | OUTPATIENT
Start: 2025-03-04 | End: 2025-03-06 | Stop reason: HOSPADM

## 2025-03-04 RX ADMIN — SODIUM CHLORIDE, PRESERVATIVE FREE 10 ML: 5 INJECTION INTRAVENOUS at 20:22

## 2025-03-04 RX ADMIN — FUROSEMIDE 20 MG: 10 INJECTION, SOLUTION INTRAMUSCULAR; INTRAVENOUS at 13:54

## 2025-03-04 RX ADMIN — ATORVASTATIN CALCIUM 40 MG: 40 TABLET, FILM COATED ORAL at 20:22

## 2025-03-04 ASSESSMENT — PAIN SCALES - GENERAL: PAINLEVEL_OUTOF10: 8

## 2025-03-04 ASSESSMENT — PAIN DESCRIPTION - LOCATION: LOCATION: LEG

## 2025-03-04 ASSESSMENT — PAIN DESCRIPTION - DESCRIPTORS: DESCRIPTORS: ACHING

## 2025-03-04 ASSESSMENT — PAIN DESCRIPTION - PAIN TYPE: TYPE: ACUTE PAIN

## 2025-03-04 ASSESSMENT — PAIN DESCRIPTION - FREQUENCY: FREQUENCY: CONTINUOUS

## 2025-03-04 ASSESSMENT — PAIN DESCRIPTION - ONSET: ONSET: ON-GOING

## 2025-03-04 ASSESSMENT — PAIN DESCRIPTION - ORIENTATION: ORIENTATION: LEFT;RIGHT

## 2025-03-04 NOTE — PROGRESS NOTES
Medication Reconciliation    List of medications patient is currently taking is complete.     Source of information: 1. Conversation with patient                                       2. EPIC records      Notes regarding home medications:   1. Patient received all of his morning home medications prior to arrival to the ER today.    [facility], PharmD, BCPS  3/4/2025 2:38 PM

## 2025-03-04 NOTE — TELEPHONE ENCOUNTER
Patient's son Benito son called in.     He states his wife was going to be bringing Maxx to the ED here at [place].

## 2025-03-04 NOTE — DISCHARGE INSTRUCTIONS
Please follow-up with a GI/Liver specialist regarding your abnormal liver tests and liver ultrasound.  Please follow-up with the blood specialist about the testing for your high blood counts  Extra Heart Failure Education/ Tools/ Resources:     https://"[employer]/publication/?c=937479   --- this is American Heart Association interactive Healthier Living with Heart Failure guidebook.  Please click hyperlink or copy / paste link into search bar. The QR Code is also available below. Use your mouse to scroll through the pages.  Lots of information about weight monitoring, diet tips, activity, meds, etc    Heart Failure Tools and Resources QR Code is below. It includes multiple resources to include symptom tracker, med tracker, further HF info, and access to a HF Support Network online Community    HF Kenyon Kenny  -- this is a free smart phone kenny available for iPhone and Android download.  Use your phone to track sodium / fluid intake, zone tool symptom tracking, weights, medications, etc. Click on this hyperlink  HF Kenyon Kenny   for QR code for easy download or the link is also found in the below HF Tools and Resources.      DASH (Dietary Approach to Stop Hypertension) diet --  https://www.nhlbi.nih.gov/education/dash-eating-plan -- this diet is a flexible eating plan that promotes heart healthy eating style.  Click on hyperlink or copy / paste link into search bar.  Lots of low sodium recipes and tips.    https://www.[employer]/recipes  -- more free recipes

## 2025-03-04 NOTE — H&P
V2.0  History and Physical      Name:  Maxx Norman /Age/Sex: 1945  (79 y.o. male)   MRN & CSN:  [id number] & [id number] Encounter Date/Time: 3/4/2025 2:35 PM EST   Location:   PCP: Michelle Humphries MD       Hospital Day: 1    Assessment and Plan:   Maxx Norman is a 79 y.o. male with a pmh of Afib, bradycardia s/p PPM, CAD who presents with Acute on chronic heart failure with preserved ejection fraction (HCC)    Hospital Problems             Last Modified POA    * (Principal) Acute on chronic heart failure with preserved ejection fraction (HCC) 3/4/2025 Yes       Plan:  Acute on chronic heart failure with preserved EF  -last echo appears to be , normal EF  -elevated pro-BNP, significant LE edema  -diurese with IV lasix  -cardiology consult  -get updated echo  Hyponatremia  -Suspect due to volume overload.  SIADH is also on differential given possible malignancy  Leukocytosis  Thrombocytosis  -pt denies fevers/chills, chest pain, shortness of breath, cough, night sweats, unexpected weight changes;  -Significant neutrophilia. No obvious infection source.  Check procalcitonin.  Concern for hematologic malignancy  -Hem/Onc consulted  Atrial fibrillation  -Continue on xarelto  Hypertnesion  -Continue lisinopril. Holding amlodipine in setting of uncertain cardiac function    Disposition:   Current Living situation: home  Expected Disposition: TBD  Estimated D/C: ]3 days    Diet Adult diet regular; no added salt   DVT Prophylaxis [] Lovenox, []  Heparin, [] SCDs, [] Ambulation,  [] Eliquis, [x] Xarelto, [] Coumadin   Code Status DNR DNI   Surrogate Decision Maker/ POA Benito Faye     Personally reviewed Lab Studies and Imaging     EKG interpreted personally and results paced rhythm    Home meds independently reviewed    Drugs that require monitoring for toxicity include Lasix and the method of monitoring was BMP    History from:     patient    History of Present Illness:     Chief Complaint:  AM    TRIG 98 08/01/2023 08:53 AM     Hemoglobin A1C:   Lab Results   Component Value Date/Time    LABA1C 5.9 08/01/2023 08:53 AM     TSH:   Lab Results   Component Value Date/Time    TSH 2.41 07/27/2016 10:20 AM     Troponin: No results found for: \"TROPONINT\"  Lactic Acid: No results for input(s): \"LACTA\" in the last 72 hours.  BNP:   Recent Labs     03/03/25  1505 03/04/25  1155   PROBNP 773* 896*     UA:  Lab Results   Component Value Date/Time    NITRU Negative 05/26/2022 02:30 AM    COLORU Yellow 05/26/2022 02:30 AM    PHUR 7.5 05/26/2022 02:30 AM    CLARITYU Clear 05/26/2022 02:30 AM    SPECGRAV 1.015 09/19/2011 12:00 AM    LEUKOCYTESUR Negative 05/26/2022 02:30 AM    UROBILINOGEN 0.2 05/26/2022 02:30 AM    BILIRUBINUR Negative 05/26/2022 02:30 AM    BILIRUBINUR neg 09/19/2011 12:00 AM    BLOODU Negative 05/26/2022 02:30 AM    GLUCOSEU Negative 05/26/2022 02:30 AM    KETUA TRACE 05/26/2022 02:30 AM     Urine Cultures: No results found for: \"LABURIN\"  Blood Cultures: No results found for: \"BC\"  No results found for: \"BLOODCULT2\"  Organism: No results found for: \"ORG\"    Imaging/Diagnostics Last 24 Hours   XR CHEST PORTABLE    Result Date: 3/4/2025  EXAMINATION: ONE XRAY VIEW OF THE CHEST 3/4/2025 11:47 am COMPARISON: 07/22/2024 HISTORY: ORDERING SYSTEM PROVIDED HISTORY: SOB TECHNOLOGIST PROVIDED HISTORY: Reason for exam:->SOB Reason for Exam: SOB FINDINGS: There is some hypoventilatory changes in the lung bases.  Left lower lobe pneumonia cannot be excluded.  Heart appears unremarkable.  Pacer lead is in good position.  Also noted is a intracavitary pacer in place, overlying the right ventricle.     Hypoventilatory changes in the lung bases. Left lower lobe pneumonia cannot be excluded.         Electronically signed by Sergio Espinal MD on 3/4/2025 at 2:35 PM

## 2025-03-04 NOTE — CARE COORDINATION
Advance Care Planning   Healthcare Decision Maker:    Primary Decision Maker: Alexus Braga - Spouse - [phone]      Today we documented Decision Maker(s) consistent with Legal Next of Kin hierarchy.    Electronically signed by Roseann Simons on 3/4/2025 at 3:17 PM

## 2025-03-04 NOTE — ED PROVIDER NOTES
"  SUBJECTIVE:   Oleg Ford is a 56 year old male who presents to clinic today for the following   health issues:      Back Pain Follow Up      Description:   Location of pain:  Bilateral  Character of pain: sharp and stabbing   Pain radiation: radiates into the right buttocks  Since last visit, pain is:  Worsened due to fall 5/14/19, injured right ankle   New numbness or weakness in legs, not attributed to pain:  YES    Intensity: Currently 5/10    History:   Pain interferes with job: Not applicable  Therapies tried without relief: None  Therapies tried with relief: opioids     SUBJECTIVE:  Here today for routine follow-up of chronic back pain.  Well-known to me with severe degenerative lumbar and sacroiliac disease.  Meets with his specialist on a regular basis and there may be further procedures upcoming.  We did discuss his current therapy which is been stable for quite some time.  But it is above our cutoff of 90 MME I do not feel comfortable continuing this without specialty consultation.    Patient had a fall yesterday while walking a dog.  The leash twisted his right ankle and its puffy and sore.  Seems a little bit better today.    Review of systems otherwise negative.  Past medical, family, and social history reviewed and updated in chart.    OBJECTIVE:  /82 (BP Location: Right arm, Patient Position: Chair, Cuff Size: Adult Large)   Pulse 66   Temp 97.9  F (36.6  C) (Oral)   Ht 1.835 m (6' 0.25\")   Wt 119.3 kg (263 lb)   SpO2 99%   BMI 35.42 kg/m    Alert, pleasant, upbeat, and in no apparent discomfort.  S1 and S2 normal, no murmurs, clicks, gallops or rubs. Regular rate and rhythm. Chest is clear; no wheezes or rales. No edema or JVD.  Right ankle slightly puffy and tender but decent range of motion  Past labs reviewed with the patient.     ASSESSMENT / PLAN:  (G89.4) Chronic pain syndrome  (primary encounter diagnosis)  Comment: We will continue his current regimen but I like to get him " [facility] EMERGENCY DEPARTMENT  EMERGENCY DEPARTMENT ENCOUNTER      Pt Name: Maxx Norman  MRN: [id number]  Birthdate 1945  Date of evaluation: 3/4/2025  Provider: Sangeeta Brito PA-C  1:40 PM    I have seen and evaluated this patient with my supervising physician Dr. Riky Sevilla M.D.    CHIEF COMPLAINT       Chief Complaint   Patient presents with    Leg Swelling     Pt reports worsening bilateral leg swelling \"for the last week\". Pt was seen by cardiology yesterday for symptoms. Pt alert and oriented at this time with no signs of distress noted. Pt rates pain as a 8/10.         HISTORY OF PRESENT ILLNESS    Maxx Norman is a 79 y.o. male who presents to the emergency department with a chief complaint of bilateral lower extremity swelling. Patient was seen by cardiology office yesterday for same complaint of bilateral lower extremity swelling and heart failure was suspected, so a cardiac workup was initiated.  Today, patient was sent to the ED by [facility] Dr. Reyes for admission due to yesterday's results (sodium 120).  Patient states lower extremity edema has been present for some time. Patient denies chest pain, shortness of breath, fever, chills.    Nursing Notes were reviewed.    REVIEW OF SYSTEMS       Review of Systems    Except as noted above the remainder of the review of systems was reviewed and negative.       PAST MEDICAL HISTORY     Past Medical History:   Diagnosis Date    Atrial fibrillation (HCC)     Basal cell carcinoma     right ear and right cheek    Bilateral tinnitus 2005    Bradycardia     required pacemaker     CAD (coronary artery disease)     4 stents placed     GERD (gastroesophageal reflux disease)     Hyperlipidemia     Hypertension     Osteoarthritis          SURGICAL HISTORY       Past Surgical History:   Procedure Laterality Date    A-V CARDIAC PACEMAKER INSERTION      bradycardia     CORONARY ANGIOPLASTY WITH STENT PLACEMENT  2008    EP DEVICE PROCEDURE N/A  in with pain management for a consultation and suggestion on long-term planning.  CSA up-to-date.  Will obtain urine drug screening.   database monitored.  Plan: PAIN MANAGEMENT REFERRAL, HYDROmorphone         (DILAUDID) 4 MG tablet, morphine (MS CONTIN) 30        MG CR tablet, Drug Abuse Screen Panel 13, Urine        (Pain Care Package)            (M51.36) DDD (degenerative disc disease), lumbar  Comment:   Plan: PAIN MANAGEMENT REFERRAL, HYDROmorphone         (DILAUDID) 4 MG tablet, morphine (MS CONTIN) 30        MG CR tablet            (M53.3) SI (sacroiliac) joint dysfunction  Comment:   Plan: PAIN MANAGEMENT REFERRAL, HYDROmorphone         (DILAUDID) 4 MG tablet, morphine (MS CONTIN) 30        MG CR tablet            (F33.2) Major depressive disorder, recurrent, severe without psychotic features (H)  Comment:   Plan: diazepam (VALIUM) 5 MG tablet            Follow up 1 month  SANTIAGO Shaw MD    (Chart documentation completed in part with Dragon voice-recognition software.  Even though reviewed some grammatical, spelling, and word errors may remain.)        History     Socioeconomic History    Marital status:      Spouse name: None    Number of children: None    Years of education: None    Highest education level: None   Tobacco Use    Smoking status: Former     Current packs/day: 0.00     Average packs/day: 0.5 packs/day for 7.0 years (3.5 ttl pk-yrs)     Types: Cigarettes     Start date: 1963     Quit date: 1970     Years since quittin.2    Smokeless tobacco: Never   Vaping Use    Vaping status: Never Used   Substance and Sexual Activity    Alcohol use: No    Drug use: No    Sexual activity: Yes     Partners: Female     Social Determinants of Health     Financial Resource Strain: Low Risk  (10/2/2023)    Overall Financial Resource Strain (CARDIA)     Difficulty of Paying Living Expenses: Not hard at all   Transportation Needs: Unknown (10/2/2023)    PRAPARE - Transportation     Lack of Transportation (Non-Medical): No   Physical Activity: Unknown (2025)    Exercise Vital Sign     Days of Exercise per Week: 0 days   Housing Stability: Unknown (2025)    Housing Stability Vital Sign     Number of Times Moved in the Last Year: 0     Homeless in the Last Year: No       SCREENINGS                         [place] Coma Scale  Eye Opening: Spontaneous  Best Verbal Response: Oriented  Best Motor Response: Obeys commands  [place] Coma Scale Score: 15                     CIWA Assessment  BP: (!) 164/73  Pulse: 92                 PHYSICAL EXAM       ED Triage Vitals [25 1115]   BP Systolic BP Percentile Diastolic BP Percentile Temp Temp Source Pulse Respirations SpO2   (!) 164/73 -- -- 97 °F (36.1 °C) Oral 92 18 99 %      Height Weight - Scale         -- 97.5 kg (214 lb 15.2 oz)             Physical Exam  HENT:      Head: Normocephalic.   Cardiovascular:      Rate and Rhythm: Normal rate.   Pulmonary:      Effort: Pulmonary effort is normal.      Breath sounds: Normal breath sounds.   Abdominal:      General: Abdomen is flat.   Musculoskeletal:  CHEST PORTABLE   Final Result   Hypoventilatory changes in the lung bases. Left lower lobe pneumonia cannot   be excluded.         Left lower lobe pneumonia is unlikely given patient clinical picture.  No fever, chills shortness of breath.    Consults as above.    Ddx includes: Heart failure, hyponatremia, hypervolemia, other    Management Given:  -Lasix 20 Mg IV,    Disposition:    Patient was admitted in stable condition to hospitalist team.     All questions were answered.      REASSESSMENT      Upon reevaluation, patient appears stable and ready for admission    CRITICAL CARE TIME   Total Critical Care time was 0 minutes, excluding separately reportable procedures.      CONSULTS:  IP CONSULT TO HOSPITALIST        FINAL IMPRESSION      1. Hypervolemia, unspecified hypervolemia type    2. Hyponatremia    3. Bilateral lower extremity edema          DISPOSITION/PLAN   DISPOSITION Decision To Admit 03/04/2025 01:01:44 PM   DISPOSITION CONDITION Stable           PATIENT REFERRED TO:  No follow-up provider specified.    DISCHARGE MEDICATIONS:  New Prescriptions    No medications on file     Controlled Substances Monitoring:          No data to display                (Please note that portions of this note were completed with a voice recognition program.  Efforts were made to edit the dictations but occasionally words are mis-transcribed.)    Sangeeta Brito PA-C (electronically signed)  Attending Emergency Physician            Sangeeta Brito PA-C  03/04/25 4534

## 2025-03-04 NOTE — ED PROVIDER NOTES
ED Attending Attestation Note    I personally saw the patient and made/approved the management plan and take responsibility for patient management.     Briefly, 79 y.o. male presents at the direction of his cardiologist for concern for volume overload.  He was also noted to have a sodium of 120 on outpatient laboratory studies from yesterday.  Denies chest pain.    Focused exam:   Gen: 79 y.o. male, NAD  HEENT: NCAT  CV: RRR w/o MRG  Lungs: CTAB  MSK: 2+ edema bilateral lower extremities, no calf tenderness or palpable cord, no overlying erythema    Imaging:   XR CHEST PORTABLE   Final Result   Hypoventilatory changes in the lung bases. Left lower lobe pneumonia cannot   be excluded.            EKG interpreted independently by myself.   Rate: normal  Rhythm: v-paced  Axis: 83  Intervals:  QTc 469  ST Segments: no acute abnormality  T waves: no acute abnormality  Comparison: Compared to 3/3/25, there is no significant change  Impression: v-paced    MDM:   This patient presents with volume overload.  He is maintaining adequate oxygenation on room air.  His outpatient laboratory studies were notable for sodium of 120 however today on repeat his sodium is 129.  Troponin is elevated at 26 (obtained in triage) however patient is chest pain-free.  Likely secondary to CHF.  .  Patient CBC shows a leukocytosis of 25, chronic for the patient and downtrending from yesterday. No fever. No source of infection. Defer antibiotics. Will continue IV diuresis.    For further details of the patient's emergency department visit, please see the advanced practice provider's documentation.    Riky Medina MD     This report has been produced using speech recognition software and may contain errors related to that system including errors in grammar, punctuation, and spelling, as well as words and phrases that may be inappropriate. If there are any questions or concerns please feel free to contact the dictating provider for  clarification.       Riky Medina MD  03/04/25 1500

## 2025-03-04 NOTE — CARE COORDINATION
Case Management Assessment  Initial Evaluation    Date/Time of Evaluation: 3/4/2025 3:15 PM  Assessment Completed by: Roseann Simons    If patient is discharged prior to next notation, then this note serves as note for discharge by case management.    Patient Name: Maxx Norman                   YOB: 1945  Diagnosis: Acute on chronic heart failure with preserved ejection fraction (HCC) [I50.33]                   Date / Time: 3/4/2025 11:27 AM    Patient Admission Status: Inpatient   Readmission Risk (Low < 19, Mod (19-27), High > 27): No data recorded  Current PCP: Michelle Humphries MD  PCP verified by CM? Yes    Chart Reviewed: Yes      History Provided by: Patient  Patient Orientation: Alert and Oriented    Patient Cognition: Alert    Hospitalization in the last 30 days (Readmission):  No    If yes, Readmission Assessment in  Navigator will be completed.    Advance Directives:      Code Status: Prior   Patient's Primary Decision Maker is: Legal Next of Kin    Primary Decision Maker: Alexus Braga - Spouse - [phone]    Discharge Planning:    Patient lives with: Spouse/Significant Other Type of Home: House  Primary Care Giver: Self  Patient Support Systems include: Spouse/Significant Other, Children   Current Financial resources: Medicare  Current community resources: None  Current services prior to admission: Durable Medical Equipment            Current DME: Shower Chair, Walker, Cane, Wheelchair            Type of Home Care services:  None    ADLS  Prior functional level: Independent in ADLs/IADLs  Current functional level: Independent in ADLs/IADLs    PT AM-PAC:   /24  OT AM-PAC:   /24    Family can provide assistance at DC: No  Would you like Case Management to discuss the discharge plan with any other family members/significant others, and if so, who? Yes (son Benito)  Plans to Return to Present Housing: Unknown at present  Other Identified Issues/Barriers to RETURNING to current  housing: None  Potential Assistance needed at discharge:              Potential DME:    Patient expects to discharge to: House  Plan for transportation at discharge:      Financial    Payor: MEDICARE / Plan: MEDICARE PART A AND B / Product Type: *No Product type* /     Does insurance require precert for SNF: No    Potential assistance Purchasing Medications: No  Meds-to-Beds request:        [facility] [id number] - [place] - [address] - P [phone] - F [phone]  [address] [place] [address]  Phone: [phone] Fax: [phone]    [facility] - [place], [place] - [address] -  [phone] - F [phone]  [address]  [address]  [facility] [address]  Phone: [phone] Fax: [phone]    [facility] - [place], [place] - [address] - P [phone] - F [phone]  [address]  [address]  [facility] [address]  Phone: [phone] Fax: [phone]      Notes:    Factors facilitating achievement of predicted outcomes: Family support, Cooperative, and Has needed Durable Medical Equipment at home    Barriers to discharge: Decreased endurance and Medical complications    Additional Case Management Notes: Patient plans on discharging home.  Is open to homecare if ordered, but does not think it will be needed.  Son will transport upon DC.    The Plan for Transition of Care is related to the following treatment goals of Acute on chronic heart failure with preserved ejection fraction (HCC) [I50.33]    IF APPLICABLE: The Patient and/or patient representative Maxx and his family were provided with a choice of provider and agrees with the discharge plan. Freedom of choice list with basic dialogue that supports the patient's individualized plan of care/goals and shares the quality data associated with the providers was provided to: Patient   Patient Representative Name:       The Patient and/or Patient  Representative Agree with the Discharge Plan? Yes    Roseann Grapevine  Case Management Department  Ph: [phone] Fax: [phone]

## 2025-03-05 ENCOUNTER — APPOINTMENT (OUTPATIENT)
Age: 80
DRG: 291 | End: 2025-03-05
Attending: STUDENT IN AN ORGANIZED HEALTH CARE EDUCATION/TRAINING PROGRAM
Payer: MEDICARE

## 2025-03-05 ENCOUNTER — APPOINTMENT (OUTPATIENT)
Dept: ULTRASOUND IMAGING | Age: 80
DRG: 291 | End: 2025-03-05
Payer: MEDICARE

## 2025-03-05 LAB
ACANTHOCYTES BLD QL SMEAR: ABNORMAL
ALBUMIN SERPL-MCNC: 3.5 G/DL (ref 3.4–5)
ALP SERPL-CCNC: 156 U/L (ref 40–129)
ALT SERPL-CCNC: 49 U/L (ref 10–40)
ANA SER QL IA: NEGATIVE
ANION GAP SERPL CALCULATED.3IONS-SCNC: 11 MMOL/L (ref 3–16)
ANISOCYTOSIS BLD QL SMEAR: ABNORMAL
APTT BLD: 41 SEC (ref 22.1–36.4)
AST SERPL-CCNC: 104 U/L (ref 15–37)
BASOPHILS # BLD: 0.6 K/UL (ref 0–0.2)
BASOPHILS NFR BLD: 3 %
BILIRUB DIRECT SERPL-MCNC: 1.1 MG/DL (ref 0–0.3)
BILIRUB INDIRECT SERPL-MCNC: 1 MG/DL (ref 0–1)
BILIRUB SERPL-MCNC: 2.1 MG/DL (ref 0–1)
BUN SERPL-MCNC: 9 MG/DL (ref 7–20)
BURR CELLS BLD QL SMEAR: ABNORMAL
BURR CELLS BLD QL SMEAR: ABNORMAL
CALCIUM SERPL-MCNC: 9.4 MG/DL (ref 8.3–10.6)
CHLORIDE SERPL-SCNC: 97 MMOL/L (ref 99–110)
CO2 SERPL-SCNC: 25 MMOL/L (ref 21–32)
CREAT SERPL-MCNC: 1 MG/DL (ref 0.8–1.3)
CRP SERPL-MCNC: 16.1 MG/L (ref 0–5.1)
DEPRECATED RDW RBC AUTO: 15.8 % (ref 12.4–15.4)
ECHO AO ROOT DIAM: 3 CM
ECHO AO ROOT INDEX: 1.34 CM/M2
ECHO AV AREA PEAK VELOCITY: 1.7 CM2
ECHO AV AREA VTI: 2 CM2
ECHO AV AREA/BSA PEAK VELOCITY: 0.8 CM2/M2
ECHO AV AREA/BSA VTI: 0.9 CM2/M2
ECHO AV CUSP MM: 1.5 CM
ECHO AV MEAN GRADIENT: 7 MMHG
ECHO AV MEAN VELOCITY: 1.2 M/S
ECHO AV PEAK GRADIENT: 14 MMHG
ECHO AV PEAK VELOCITY: 1.9 M/S
ECHO AV VELOCITY RATIO: 0.68
ECHO AV VTI: 37.4 CM
ECHO BSA: 2.25 M2
ECHO EST RA PRESSURE: 8 MMHG
ECHO LA AREA 2C: 26 CM2
ECHO LA AREA 4C: 31.4 CM2
ECHO LA DIAMETER INDEX: 1.83 CM/M2
ECHO LA DIAMETER: 4.1 CM
ECHO LA MAJOR AXIS: 7 CM
ECHO LA MINOR AXIS: 7.2 CM
ECHO LA TO AORTIC ROOT RATIO: 1.37
ECHO LA VOL BP: 97 ML (ref 18–58)
ECHO LA VOL MOD A2C: 77 ML (ref 18–58)
ECHO LA VOL MOD A4C: 117 ML (ref 18–58)
ECHO LA VOL/BSA BIPLANE: 43 ML/M2 (ref 16–34)
ECHO LA VOLUME INDEX MOD A2C: 34 ML/M2 (ref 16–34)
ECHO LA VOLUME INDEX MOD A4C: 52 ML/M2 (ref 16–34)
ECHO LV E' LATERAL VELOCITY: 11.1 CM/S
ECHO LV E' SEPTAL VELOCITY: 10.9 CM/S
ECHO LV EDV A4C: 78 ML
ECHO LV EDV INDEX A4C: 35 ML/M2
ECHO LV EF PHYSICIAN: 70 %
ECHO LV EJECTION FRACTION A4C: 70 %
ECHO LV ESV A4C: 24 ML
ECHO LV ESV INDEX A4C: 11 ML/M2
ECHO LV FRACTIONAL SHORTENING: 29 % (ref 28–44)
ECHO LV INTERNAL DIMENSION DIASTOLE INDEX: 1.88 CM/M2
ECHO LV INTERNAL DIMENSION DIASTOLIC: 4.2 CM (ref 4.2–5.9)
ECHO LV INTERNAL DIMENSION SYSTOLIC INDEX: 1.34 CM/M2
ECHO LV INTERNAL DIMENSION SYSTOLIC: 3 CM
ECHO LV IVSD: 1.1 CM (ref 0.6–1)
ECHO LV MASS 2D: 147 G (ref 88–224)
ECHO LV MASS INDEX 2D: 65.6 G/M2 (ref 49–115)
ECHO LV POSTERIOR WALL DIASTOLIC: 1 CM (ref 0.6–1)
ECHO LV RELATIVE WALL THICKNESS RATIO: 0.48
ECHO LVOT AREA: 2.3 CM2
ECHO LVOT AV VTI INDEX: 0.85
ECHO LVOT DIAM: 1.7 CM
ECHO LVOT MEAN GRADIENT: 4 MMHG
ECHO LVOT PEAK GRADIENT: 7 MMHG
ECHO LVOT PEAK VELOCITY: 1.3 M/S
ECHO LVOT STROKE VOLUME INDEX: 32.2 ML/M2
ECHO LVOT SV: 72.1 ML
ECHO LVOT VTI: 31.8 CM
ECHO MV A VELOCITY: 0.3 M/S
ECHO MV E DECELERATION TIME (DT): 153 MS
ECHO MV E VELOCITY: 1.08 M/S
ECHO MV E/A RATIO: 3.6
ECHO MV E/E' LATERAL: 9.73
ECHO MV E/E' RATIO (AVERAGED): 9.82
ECHO MV E/E' SEPTAL: 9.91
ECHO MV REGURGITANT PEAK GRADIENT: 85 MMHG
ECHO MV REGURGITANT PEAK VELOCITY: 4.6 M/S
ECHO PV ACCELERATION TIME (AT): 122 MS
ECHO PV MAX VELOCITY: 1.7 M/S
ECHO PV PEAK GRADIENT: 11 MMHG
ECHO RA AREA 4C: 26.4 CM2
ECHO RA END SYSTOLIC VOLUME APICAL 4 CHAMBER INDEX BSA: 42 ML/M2
ECHO RA VOLUME: 93 ML
ECHO RIGHT VENTRICULAR SYSTOLIC PRESSURE (RVSP): 44 MMHG
ECHO RV FREE WALL PEAK S': 14.8 CM/S
ECHO RV INTERNAL DIMENSION: 2.9 CM
ECHO RV TAPSE: 1.9 CM (ref 1.7–?)
ECHO TV E WAVE: 0.7 M/S
ECHO TV PEAK GRADIENT: 2 MMHG
ECHO TV REGURGITANT MAX VELOCITY: 2.99 M/S
ECHO TV REGURGITANT PEAK GRADIENT: 36 MMHG
ECHO TV REGURGITANT VTI: 83.6 CM
EOSINOPHIL # BLD: 0.4 K/UL (ref 0–0.6)
EOSINOPHIL NFR BLD: 2 %
ERYTHROCYTE [SEDIMENTATION RATE] IN BLOOD BY WESTERGREN METHOD: 7 MM/HR (ref 0–20)
FERRITIN SERPL IA-MCNC: 1040 NG/ML (ref 30–400)
FOLATE SERPL-MCNC: 13 NG/ML (ref 4.78–24.2)
GFR SERPLBLD CREATININE-BSD FMLA CKD-EPI: 76 ML/MIN/{1.73_M2}
GLUCOSE SERPL-MCNC: 99 MG/DL (ref 70–99)
HCT VFR BLD AUTO: 34.9 % (ref 40.5–52.5)
HGB BLD-MCNC: 11.7 G/DL (ref 13.5–17.5)
INR PPP: 1.68 (ref 0.85–1.15)
IRON SATN MFR SERPL: 13 % (ref 20–50)
IRON SERPL-MCNC: 36 UG/DL (ref 59–158)
LYMPHOCYTES # BLD: 3.1 K/UL (ref 1–5.1)
LYMPHOCYTES NFR BLD: 9 %
MCH RBC QN AUTO: 34.5 PG (ref 26–34)
MCHC RBC AUTO-ENTMCNC: 33.6 G/DL (ref 31–36)
MCV RBC AUTO: 102.7 FL (ref 80–100)
METAMYELOCYTES NFR BLD MANUAL: 8 %
MONOCYTES # BLD: 1.8 K/UL (ref 0–1.3)
MONOCYTES NFR BLD: 9 %
MYELOCYTES NFR BLD MANUAL: 4 %
NEUTROPHILS # BLD: 14.6 K/UL (ref 1.7–7.7)
NEUTROPHILS NFR BLD: 37 %
NEUTS BAND NFR BLD MANUAL: 21 % (ref 0–7)
NRBC BLD-RTO: 1 /100 WBC
OVALOCYTES BLD QL SMEAR: ABNORMAL
PATH INTERP BLD-IMP: NO
PHOSPHATE SERPL-MCNC: 3.2 MG/DL (ref 2.5–4.9)
PLATELET # BLD AUTO: 827 K/UL (ref 135–450)
PLATELET BLD QL SMEAR: ABNORMAL
PMV BLD AUTO: 8.5 FL (ref 5–10.5)
POIKILOCYTOSIS BLD QL SMEAR: ABNORMAL
POLYCHROMASIA BLD QL SMEAR: ABNORMAL
POTASSIUM SERPL-SCNC: 3.8 MMOL/L (ref 3.5–5.1)
PROMYELOCYTES NFR BLD MANUAL: 1 %
PROT SERPL-MCNC: 6.3 G/DL (ref 6.4–8.2)
PROTHROMBIN TIME: 19.9 SEC (ref 11.9–14.9)
RBC # BLD AUTO: 3.4 M/UL (ref 4.2–5.9)
REASON FOR REJECTION: NORMAL
REJECTED TEST: NORMAL
SCHISTOCYTES BLD QL SMEAR: ABNORMAL
SLIDE REVIEW: ABNORMAL
SMUDGE CELLS BLD QL SMEAR: PRESENT
SODIUM SERPL-SCNC: 133 MMOL/L (ref 136–145)
TIBC SERPL-MCNC: 267 UG/DL (ref 260–445)
TOXIC GRANULES BLD QL SMEAR: PRESENT
VARIANT LYMPHS NFR BLD MANUAL: 6 % (ref 0–6)
VIT B12 SERPL-MCNC: >4000 PG/ML (ref 211–911)
WBC # BLD AUTO: 20.5 K/UL (ref 4–11)

## 2025-03-05 PROCEDURE — 76705 ECHO EXAM OF ABDOMEN: CPT

## 2025-03-05 PROCEDURE — 97530 THERAPEUTIC ACTIVITIES: CPT

## 2025-03-05 PROCEDURE — 6370000000 HC RX 637 (ALT 250 FOR IP): Performed by: INTERNAL MEDICINE

## 2025-03-05 PROCEDURE — 86038 ANTINUCLEAR ANTIBODIES: CPT

## 2025-03-05 PROCEDURE — 36415 COLL VENOUS BLD VENIPUNCTURE: CPT

## 2025-03-05 PROCEDURE — 82607 VITAMIN B-12: CPT

## 2025-03-05 PROCEDURE — 84165 PROTEIN E-PHORESIS SERUM: CPT

## 2025-03-05 PROCEDURE — 2500000003 HC RX 250 WO HCPCS: Performed by: STUDENT IN AN ORGANIZED HEALTH CARE EDUCATION/TRAINING PROGRAM

## 2025-03-05 PROCEDURE — 6360000002 HC RX W HCPCS: Performed by: STUDENT IN AN ORGANIZED HEALTH CARE EDUCATION/TRAINING PROGRAM

## 2025-03-05 PROCEDURE — 83550 IRON BINDING TEST: CPT

## 2025-03-05 PROCEDURE — 1200000000 HC SEMI PRIVATE

## 2025-03-05 PROCEDURE — 82746 ASSAY OF FOLIC ACID SERUM: CPT

## 2025-03-05 PROCEDURE — 85730 THROMBOPLASTIN TIME PARTIAL: CPT

## 2025-03-05 PROCEDURE — 99223 1ST HOSP IP/OBS HIGH 75: CPT | Performed by: INTERNAL MEDICINE

## 2025-03-05 PROCEDURE — 85610 PROTHROMBIN TIME: CPT

## 2025-03-05 PROCEDURE — 88184 FLOWCYTOMETRY/ TC 1 MARKER: CPT

## 2025-03-05 PROCEDURE — 85652 RBC SED RATE AUTOMATED: CPT

## 2025-03-05 PROCEDURE — 86140 C-REACTIVE PROTEIN: CPT

## 2025-03-05 PROCEDURE — 85025 COMPLETE CBC W/AUTO DIFF WBC: CPT

## 2025-03-05 PROCEDURE — 84100 ASSAY OF PHOSPHORUS: CPT

## 2025-03-05 PROCEDURE — 88185 FLOWCYTOMETRY/TC ADD-ON: CPT

## 2025-03-05 PROCEDURE — 93306 TTE W/DOPPLER COMPLETE: CPT

## 2025-03-05 PROCEDURE — 80076 HEPATIC FUNCTION PANEL: CPT

## 2025-03-05 PROCEDURE — 82728 ASSAY OF FERRITIN: CPT

## 2025-03-05 PROCEDURE — 93306 TTE W/DOPPLER COMPLETE: CPT | Performed by: INTERNAL MEDICINE

## 2025-03-05 PROCEDURE — 81270 JAK2 GENE: CPT

## 2025-03-05 PROCEDURE — 97162 PT EVAL MOD COMPLEX 30 MIN: CPT

## 2025-03-05 PROCEDURE — 6370000000 HC RX 637 (ALT 250 FOR IP): Performed by: STUDENT IN AN ORGANIZED HEALTH CARE EDUCATION/TRAINING PROGRAM

## 2025-03-05 PROCEDURE — 94760 N-INVAS EAR/PLS OXIMETRY 1: CPT

## 2025-03-05 PROCEDURE — 80048 BASIC METABOLIC PNL TOTAL CA: CPT

## 2025-03-05 PROCEDURE — 84155 ASSAY OF PROTEIN SERUM: CPT

## 2025-03-05 PROCEDURE — 83540 ASSAY OF IRON: CPT

## 2025-03-05 RX ORDER — ATORVASTATIN CALCIUM 10 MG/1
10 TABLET, FILM COATED ORAL NIGHTLY
Status: DISCONTINUED | OUTPATIENT
Start: 2025-03-05 | End: 2025-03-06 | Stop reason: HOSPADM

## 2025-03-05 RX ORDER — SPIRONOLACTONE 25 MG/1
25 TABLET ORAL DAILY
Status: DISCONTINUED | OUTPATIENT
Start: 2025-03-05 | End: 2025-03-06 | Stop reason: HOSPADM

## 2025-03-05 RX ORDER — CHLORTHALIDONE 25 MG/1
25 TABLET ORAL DAILY
Status: DISCONTINUED | OUTPATIENT
Start: 2025-03-05 | End: 2025-03-06 | Stop reason: HOSPADM

## 2025-03-05 RX ADMIN — SODIUM CHLORIDE, PRESERVATIVE FREE 10 ML: 5 INJECTION INTRAVENOUS at 10:18

## 2025-03-05 RX ADMIN — LISINOPRIL 40 MG: 40 TABLET ORAL at 10:14

## 2025-03-05 RX ADMIN — FUROSEMIDE 40 MG: 10 INJECTION, SOLUTION INTRAMUSCULAR; INTRAVENOUS at 10:14

## 2025-03-05 RX ADMIN — CHLORTHALIDONE 25 MG: 25 TABLET ORAL at 10:17

## 2025-03-05 RX ADMIN — ATORVASTATIN CALCIUM 10 MG: 10 TABLET, FILM COATED ORAL at 20:28

## 2025-03-05 RX ADMIN — SPIRONOLACTONE 25 MG: 25 TABLET ORAL at 10:17

## 2025-03-05 RX ADMIN — SODIUM CHLORIDE, PRESERVATIVE FREE 10 ML: 5 INJECTION INTRAVENOUS at 20:31

## 2025-03-05 RX ADMIN — FUROSEMIDE 40 MG: 10 INJECTION, SOLUTION INTRAMUSCULAR; INTRAVENOUS at 17:59

## 2025-03-05 RX ADMIN — RIVAROXABAN 20 MG: 20 TABLET, FILM COATED ORAL at 10:14

## 2025-03-05 NOTE — CONSULTS
[facility]    Consult Note      Requesting Physician:  tiffanie     CHIEF COMPLAINT:  swelling       HISTORY OF PRESENT ILLNESS:    Mr. Norman  is a 79 y.o. male we are seeing in consultation for a high wbc and thrombocytosis   He was admitted for sob /chf but was found to have a high wbc and high plt count  No hx of clots or dvt/pe   He was found to have a high plt count and wbc  Denies b symptoms   NO weight loss NO nv NO diarrhea   NO uri  NO bone pain   No sweats       ICD-10-CM    1. Hypervolemia, unspecified hypervolemia type  E87.70       2. Hyponatremia  E87.1       3. Bilateral lower extremity edema  R60.0       4. Acute on chronic heart failure with preserved ejection fraction (HCC)  I50.33 Echo (TTE) complete (PRN contrast/bubble/strain/3D)     Echo (TTE) complete (PRN contrast/bubble/strain/3D)             Past Medical History:  Past Medical History:   Diagnosis Date    Atrial fibrillation (HCC)     Basal cell carcinoma     right ear and right cheek    Bilateral tinnitus 2005    Bradycardia     required pacemaker     CAD (coronary artery disease)     4 stents placed     GERD (gastroesophageal reflux disease)     Hyperlipidemia     Hypertension     Osteoarthritis        Past Surgical History:  Past Surgical History:   Procedure Laterality Date    A-V CARDIAC PACEMAKER INSERTION      bradycardia     CORONARY ANGIOPLASTY WITH STENT PLACEMENT  2008    EP DEVICE PROCEDURE N/A 7/22/2024    Insert PPM dual performed by Shankar Carballo MD at [facility] CARDIAC CATH LAB    EXTERNAL EAR SURGERY Right     removed basal cell cancer    SKIN CANCER DESTRUCTION  03/2017    Right cheek       Current Medications:  Current Facility-Administered Medications   Medication Dose Route Frequency Provider Last Rate Last Admin    rivaroxaban (XARELTO) tablet 20 mg  20 mg Oral Daily with breakfast Tiffanie  position.  Also noted is a intracavitary pacer in place, overlying the  right ventricle.  Impression: Hypoventilatory changes in the lung bases. Left lower lobe pneumonia cannot  be excluded.      Problem List  Patient Active Problem List   Diagnosis    Hypercholesterolemia    Benign hypertension    CAD (coronary artery disease)    CAD (coronary artery disease)    BCC (basal cell carcinoma of skin)    Vision changes    Occipital infarction (HCC)    Hearing loss of both ears    Subjective tinnitus of both ears    Neck mass    Vision loss of right eye    Atrial fibrillation with slow ventricular response (HCC)    Former tobacco use    Acute bacterial sinusitis    Bradycardia with 31-40 beats per minute    Pacemaker    Essential hypertension    Peripheral vertigo    Vertigo    SSS (sick sinus syndrome) (HCC)    Localized edema    Acute on chronic heart failure with preserved ejection fraction (HCC)       IMPRESSION/RECOMMENDATIONS:  Thrombocytosis /leukocytosis  Will send xavier 2 bcr abl flow   Jeanna esr crp iron studies spep and anemia workup   He may have a myeloproliferative dx  Doubt acute leukemia for now   May have ET   Will order us spleen to eval for splenomegly -exam neg   May have a chronic leukemia cml/cll but based on diff would be somewhat surprised   He does not need to stay inpt for the labs I drew but would recommend outpt fu     Chf per primary team   Will arrange fu         Thank you for asking me to see the patient.       Roseline Pablo MD  Please Contact Through [employer]

## 2025-03-05 NOTE — DISCHARGE INSTR - COC
Continuity of Care Form    Patient Name: Maxx Norman   :  1945  MRN:  [id number]    Admit date:  3/4/2025  Discharge date:  ***    Code Status Order: Limited   Advance Directives:   Advance Care Flowsheet Documentation             Admitting Physician:  Sergio Espinal MD  PCP: Michelle Humphries MD    Discharging Nurse: ***  Discharging Hospital Unit/Room#: X8Q-6138/4260-01  Discharging Unit Phone Number: ***    Emergency Contact:   Extended Emergency Contact Information  Primary Emergency Contact: Benito Norman  Address: [address] [place] [id number] [facility]  Home Phone: [phone]  Relation: Child  Secondary Emergency [place]: [place] [address] [facility]  Home Phone: [phone]  Relation: Spouse    Past Surgical History:  Past Surgical History:   Procedure Laterality Date    A-V CARDIAC PACEMAKER INSERTION      bradycardia     CORONARY ANGIOPLASTY WITH STENT PLACEMENT      EP DEVICE PROCEDURE N/A 2024    Insert PPM dual performed by Shankar Carballo MD at [facility] CARDIAC CATH LAB    EXTERNAL EAR SURGERY Right     removed basal cell cancer    SKIN CANCER DESTRUCTION  2017    Right cheek       Immunization History:   Immunization History   Administered Date(s) Administered    Influenza Vaccine, unspecified formulation 10/21/2015    Influenza Virus Vaccine 2015    Influenza, FLUAD, (age 65 y+), IM, Quadv, 0.5mL 10/21/2020, 09/15/2022, 10/02/2023    Influenza, FLUBLOK, (age 18 y+), Quadv PF, 0.5mL 10/22/2019    Influenza, FLUCELVAX, (age 6 mo+) IM, Trivalent PF, 0.5mL 2024    Influenza, FLUCELVAX, (age 6 mo+), MDCK, Quadv PF, 0.5mL 2022    Influenza, FLUZONE High Dose, (age 65 y+), IM, Trivalent PF, 0.5mL 2017, 10/09/2018    Pneumococcal, PCV-13, PREVNAR 13, (age 6w+), IM, 0.5mL 2016    Pneumococcal, PPSV23, PNEUMOVAX 23, (age 2y+), SC/IM, 0.5mL 2008, 2010    Td, unspecified  (Active)   Number of days: 226        Elimination:  Continence:   Bowel: {YES / NO:}  Bladder: {YES / NO:}  Urinary Catheter: {Urinary Catheter:822558846}   Colostomy/Ileostomy/Ileal Conduit: {YES / NO:}       Date of Last BM: ***    Intake/Output Summary (Last 24 hours) at 3/5/2025 1554  Last data filed at 3/5/2025 0131  Gross per 24 hour   Intake 240 ml   Output 1375 ml   Net -1135 ml     I/O last 3 completed shifts:  In: 240 [P.O.:240]  Out: 2150 [Urine:]    Safety Concerns:     { NIECY Safety Concerns:081471997}    Impairments/Disabilities:      { NIECY Impairments/Disabilities:446873691}    Nutrition Therapy:  Current Nutrition Therapy:   { NIECY Diet List:385813984}    Routes of Feeding: {CHP DME Other Feedings:860361030}  Liquids: {Slp liquid thickness:61269}  Daily Fluid Restriction: {CHP DME Yes amt example:446057915}  Last Modified Barium Swallow with Video (Video Swallowing Test): {Done Not Done Date:}    Treatments at the Time of Hospital Discharge:   Respiratory Treatments: ***  Oxygen Therapy:  {Therapy; copd oxygen:09815}  Ventilator:    { CC Vent List:754819982}    Rehab Therapies: {THERAPEUTIC INTERVENTION:2717695746}  Weight Bearing Status/Restrictions: {[facility] Weight Bearin}  Other Medical Equipment (for information only, NOT a DME order):  {EQUIPMENT:950775997}  Other Treatments:     Heart Failure Instructions for Daily Management  Patient was treated for acute on chronic diastolic heart failure.  he  will require the following:    Please weigh daily on the same scale and approximately the same time of day.  Report weight gain of 3 pounds/day or 5 pounds/week to : Michelle Humphries [phone] and [facility] [phone].  Please use hospital discharge weight as baseline reference.   Please monitor for signs and symptoms of and report to MD:  Worsening Heart Failure: sudden weight gain, shortness of breath, lower extremity or general  above information and transfer of Maxx Norman  is necessary for the continuing treatment of the diagnosis listed and that he requires Home Care for less 30 days.     Update Admission H&P: No change in H&P    PHYSICIAN SIGNATURE:  Electronically signed by Sergio Espinal MD on 3/6/25 at 1:40 PM EST

## 2025-03-05 NOTE — PROGRESS NOTES
V2.0  [facility] Progress Note      Name:  Maxx Norman /Age/Sex: 1945  (79 y.o. male)   MRN & CSN:  [id number] & [id number] Encounter Date/Time: 3/5/2025 7:45 AM EST    Location:  [id number]/4260-01 PCP: Michelle Humphries MD       Hospital Day: 2    Assessment and Plan:   Maxx Norman is a 79 y.o. male with       Plan:  Acute on chronic heart failure with preserved EF  -last echo appears to be , normal EF  -elevated pro-BNP, significant LE edema  -diurese with IV lasix.  Monitor toxicity of Lasix with BMP for NIDHI  -Echo 3/5 with hyperdynamic LVSF, EF 70%; indeterminate diastolic dysfunction, Mild TR;  -cardiology consulted. Discussed 3/5: may need loop diuretic at discharge. Echocardiogram looks fairly good. Not clear that LE edema is fully cardiac  Hyponatremia -improved  -Suspect due to volume overload  Leukocytosis  Thrombocytosis  -pt denies fevers/chills, chest pain, shortness of breath, cough, night sweats, unexpected weight changes;  -Significant neutrophilia. No obvious infection source.  Check procalcitonin.  Concern for hematologic disorder  -Hem/Onc consulted. Note 3/5 reviewed: Sending serologic workup.  May have myeloproliferative dx. doubt acute leukemia.  May have chronic leukemia but would be surprising based on differential.  Ultrasound spleen to evaluate for splenomegaly.  Can follow-up outpatient regarding labs.  Elevated liver enzymes  -chronically elevated bilirubin  -transaminases and alk phos downtrending; unclear why elevated, possibly fatty liver disease  -Will get Liver ultrasound  Atrial fibrillation  -Continue on xarelto  Hypertension  -Continue lisinopril  -Holding amlodipine in setting of uncertain cardiac function    CODE STATUS: Limited DNR/DNI  Ppx: AC with Xarelto  Dispo: Home    Subjective:     Chief Complaint: Leg Swelling (Pt reports worsening bilateral leg swelling \"for the last week\". Pt was seen by cardiology yesterday for symptoms. Pt alert and  03/04/25 11:55 AM   Result Value Ref Range    Procalcitonin 0.24 (H) 0.00 - 0.15 ng/mL   Blood Smear Review    Collection Time: 03/04/25 11:55 AM   Result Value Ref Range    Path Consult Reviewed    EKG 12 Lead    Collection Time: 03/04/25  1:46 PM   Result Value Ref Range    Ventricular Rate 78 BPM    Atrial Rate 258 BPM    QRS Duration 106 ms    Q-T Interval 412 ms    QTc Calculation (Bazett) 469 ms    R Axis 83 degrees    T Axis -14 degrees    Diagnosis       Ventricular-paced rhythmunderlying rhythm is A fibAbnormal ECGWhen compared with ECG of 22-JUL-2024 10:46,Vent. rate has increased BY  18 BPMConfirmed by NUPUR INTERIANO MD ([id number]) on 3/4/2025 4:49:22 PM   Basic Metabolic Panel w/ Reflex to MG    Collection Time: 03/05/25  5:36 AM   Result Value Ref Range    Sodium 133 (L) 136 - 145 mmol/L    Potassium reflex Magnesium 3.8 3.5 - 5.1 mmol/L    Chloride 97 (L) 99 - 110 mmol/L    CO2 25 21 - 32 mmol/L    Anion Gap 11 3 - 16    Glucose 99 70 - 99 mg/dL    BUN 9 7 - 20 mg/dL    Creatinine 1.0 0.8 - 1.3 mg/dL    Est, Glom Filt Rate 76 >60    Calcium 9.4 8.3 - 10.6 mg/dL   Hepatic Function Panel    Collection Time: 03/05/25  5:36 AM   Result Value Ref Range    Total Protein 6.3 (L) 6.4 - 8.2 g/dL    Albumin 3.5 3.4 - 5.0 g/dL    Alkaline Phosphatase 156 (H) 40 - 129 U/L    ALT 49 (H) 10 - 40 U/L     (H) 15 - 37 U/L    Total Bilirubin 2.1 (H) 0.0 - 1.0 mg/dL    Bilirubin, Direct 1.1 (H) 0.0 - 0.3 mg/dL    Bilirubin, Indirect 1.0 0.0 - 1.0 mg/dL   CBC with Auto Differential    Collection Time: 03/05/25  5:36 AM   Result Value Ref Range    WBC 20.5 (H) 4.0 - 11.0 K/uL    RBC 3.40 (L) 4.20 - 5.90 M/uL    Hemoglobin 11.7 (L) 13.5 - 17.5 g/dL    Hematocrit 34.9 (L) 40.5 - 52.5 %    .7 (H) 80.0 - 100.0 fL    MCH 34.5 (H) 26.0 - 34.0 pg    MCHC 33.6 31.0 - 36.0 g/dL    RDW 15.8 (H) 12.4 - 15.4 %    Platelets 827 (H) 135 - 450 K/uL    MPV 8.5 5.0 - 10.5 fL   Phosphorus    Collection Time: 03/05/25  5:36 AM

## 2025-03-05 NOTE — PLAN OF CARE
Problem: Chronic Conditions and Co-morbidities  Goal: Patient's chronic conditions and co-morbidity symptoms are monitored and maintained or improved  Outcome: Progressing     Problem: Discharge Planning  Goal: Discharge to home or other facility with appropriate resources  3/5/2025 1744 by Emy Rodríguez RN  Outcome: Progressing  3/5/2025 0412 by Rebecca Sandoval RN  Outcome: Progressing  Flowsheets (Taken 3/5/2025 0412)  Discharge to home or other facility with appropriate resources:   Identify barriers to discharge with patient and caregiver   Identify discharge learning needs (meds, wound care, etc)   Arrange for needed discharge resources and transportation as appropriate   Arrange for interpreters to assist at discharge as needed     Problem: Safety - Adult  Goal: Free from fall injury  3/5/2025 1744 by Emy Rodríguez RN  Outcome: Progressing  3/5/2025 0412 by Rebecca Sandoval RN  Outcome: Progressing     Problem: Respiratory - Adult  Goal: Achieves optimal ventilation and oxygenation  3/5/2025 1744 by Emy Rodríguez RN  Outcome: Progressing  3/5/2025 0412 by Rebecca Sandoval RN  Outcome: Progressing  Flowsheets (Taken 3/5/2025 0412)  Achieves optimal ventilation and oxygenation:   Assess for changes in respiratory status   Position to facilitate oxygenation and minimize respiratory effort   Initiate smoking cessation protocol as indicated   Assess for changes in mentation and behavior   Oxygen supplementation based on oxygen saturation or arterial blood gases     Problem: Metabolic/Fluid and Electrolytes - Adult  Goal: Electrolytes maintained within normal limits  3/5/2025 1744 by Emy Rodríguez RN  Outcome: Progressing  3/5/2025 0412 by Rebecca Sandoval RN  Outcome: Progressing  Flowsheets (Taken 3/5/2025 0412)  Electrolytes maintained within normal limits:   Monitor labs and assess patient for signs and symptoms of electrolyte imbalances   Administer electrolyte replacement as ordered   Monitor  response to electrolyte replacements, including repeat lab results as appropriate   Fluid restriction as ordered   Instruct patient on fluid and nutrition restrictions as appropriate  Goal: Hemodynamic stability and optimal renal function maintained  3/5/2025 1744 by Emy Rodríguez, RN  Outcome: Progressing  3/5/2025 0412 by Rebecca Sandoval, RN  Outcome: Progressing  Flowsheets (Taken 3/5/2025 0412)  Hemodynamic stability and optimal renal function maintained:   Monitor labs and assess for signs and symptoms of volume excess or deficit   Monitor intake, output and patient weight   Monitor urine specific gravity, serum osmolarity and serum sodium as indicated or ordered   Monitor response to interventions for patient's volume status, including labs, urine output, blood pressure (other measures as available)   Encourage oral intake as appropriate

## 2025-03-05 NOTE — CONSULTS
[facility]  Cardiology Consult Note        CC:      Hyponatremia/lower extremity edema             HPI:   This is a 79 y.o. male with a history of CAD with stents to the LAD in 2008, new onset A-fib, bradycardia status post pacemaker.  In addition has hypertension and HLD    He was seen by Conchis Locke, nurse practitioner for bilateral lower extremity edema, abdominal bloating.  She ordered blood work which indicated severe hyponatremia which is the reason why I requested that he be admitted to the hospital.    Admission blood work showed sodium of 120 proBNP of 773, elevated liver enzymes particularly  along with alkaline phosphatase.  The patient also has markedly elevated white count and platelet counts  Past Medical History:   Diagnosis Date    Atrial fibrillation (HCC)     Basal cell carcinoma     right ear and right cheek    Bilateral tinnitus 2005    Bradycardia     required pacemaker     CAD (coronary artery disease)     4 stents placed     GERD (gastroesophageal reflux disease)     Hyperlipidemia     Hypertension     Osteoarthritis       Past Surgical History:   Procedure Laterality Date    A-V CARDIAC PACEMAKER INSERTION      bradycardia     CORONARY ANGIOPLASTY WITH STENT PLACEMENT  2008    EP DEVICE PROCEDURE N/A 7/22/2024    Insert PPM dual performed by Shankar Carballo MD at [facility] CARDIAC CATH LAB    EXTERNAL EAR SURGERY Right     removed basal cell cancer    SKIN CANCER DESTRUCTION  03/2017    Right cheek      Family History   Problem Relation Age of Onset    Arthritis Mother     Heart Disease Father     Stroke Father     Other Brother         couldnt walk and in nursing home  disable     No Known Problems Maternal Grandmother     High Blood Pressure Maternal Grandfather     No Known Problems Paternal Grandmother     No Known Problems Paternal Grandfather       Social History     Tobacco Use    Smoking status: Former     Current packs/day: 0.00     Average packs/day: 0.5 packs/day for  things that are unexplained.  This includes elevated alk phos and liver enzyme elevations.  I do not have an expression for a lower extremity edema also.  Will treated empirically  A patient has an appointment to see hematology outpatient        TRISH Reyes M.D  3/5/2025

## 2025-03-05 NOTE — PROGRESS NOTES
Physical Therapy  Facility/Department: [address] MED SURG  Physical Therapy Initial Assessment    Name: Maxx Norman  : 1945  MRN: [id number]  Date of Service: 3/5/2025    Discharge Recommendations:  Home with assist PRN          Patient Diagnosis(es): The primary encounter diagnosis was Hypervolemia, unspecified hypervolemia type. Diagnoses of Hyponatremia, Bilateral lower extremity edema, and Acute on chronic heart failure with preserved ejection fraction (HCC) were also pertinent to this visit.  Past Medical History:  has a past medical history of Atrial fibrillation (HCC), Basal cell carcinoma, Bilateral tinnitus, Bradycardia, CAD (coronary artery disease), GERD (gastroesophageal reflux disease), Hyperlipidemia, Hypertension, and Osteoarthritis.  Past Surgical History:  has a past surgical history that includes Coronary angioplasty with stent (); External ear surgery (Right); Skin cancer destruction (2017); A-V cardiac pacemaker insertion; and ep device procedure (N/A, 2024).    Assessment  Body Structures, Functions, Activity Limitations Requiring Skilled Therapeutic Intervention: Decreased functional mobility ;Decreased safe awareness;Decreased endurance;Decreased balance;Decreased strength  Assessment: He was admitted on 3/4/25 for sob /chf but was found to have a high wbc and high plt count. Prior to admission, pt living with spouse in home setting, independent with ADLs and ambulatory without device. Pt currently functioning near baseline. Anticipate return home with PRN assist.  Treatment Diagnosis: impaired mobility  Therapy Prognosis: Good  Decision Making: Medium Complexity  History: see above  Exam: see below  Clinical Presentation: evolving  Requires PT Follow-Up: Yes  Activity Tolerance  Activity Tolerance: Patient limited by fatigue;Patient limited by endurance    Plan  Physical Therapy Plan  General Plan: 3-5 times per week  Current Treatment Recommendations: Strengthening,  Impaired  Vision Exceptions: Wears glasses at all times  Hearing  Hearing: Exceptions to WFL  Hearing Exceptions: Hard of hearing/hearing concerns      Cognition   Orientation  Overall Orientation Status: Within Functional Limits  Cognition  Overall Cognitive Status: WFL    Objective  Gross Assessment  Strength: Generally decreased, functional       Bed mobility  Supine to Sit: Unable to assess  Sit to Supine: Unable to assess  Bed Mobility Comments: In recliner at start and end of session  Transfers  Sit to Stand: Contact guard assistance  Stand to Sit: Contact guard assistance  Ambulation  Surface: Level tile  Device: No Device  Assistance: Contact guard assistance  Gait Deviations: Slow Henna;Decreased step length;Decreased step height  Distance: 25' x 2 trials  Comments: Pt ambulated to toilet to void, then to sink to wash hands and brush teeth. Pt returned to recliner. Declines further mobility at this time.     Balance  Posture: Good  Sitting - Static: Good  Sitting - Dynamic: Good  Standing - Static: Fair;+  Standing - Dynamic: Fair;+            AM-PAC - Mobility  AM-PAC Basic Mobility - Inpatient   How much help is needed turning from your back to your side while in a flat bed without using bedrails?: None  How much help is needed moving from lying on your back to sitting on the side of a flat bed without using bedrails?: None  How much help is needed moving to and from a bed to a chair?: None  How much help is needed standing up from a chair using your arms?: None  How much help is needed walking in hospital room?: A Little  How much help is needed climbing 3-5 steps with a railing?: A Little  AM-[facility] Inpatient Mobility Raw Score : 22  AM-PAC Inpatient T-Scale Score : 53.28  Mobility Inpatient CMS 0-100% Score: 20.91  Mobility Inpatient CMS G-Code Modifier : CJ           Goals  Short Term Goals  Time Frame for Short Term Goals: by acute discharge  Short Term Goal 1: bed mobility mod I  Short Term Goal 2:

## 2025-03-05 NOTE — PROGRESS NOTES
4 Eyes Skin Assessment     NAME:  Maxx Norman  YOB: 1945  MEDICAL RECORD NUMBER:  [id number]    The patient is being assessed for  Admission    I agree that at least one RN has performed a thorough Head to Toe Skin Assessment on the patient. ALL assessment sites listed below have been assessed.      Areas assessed by both nurses:    Head, Face, Ears, Shoulders, Back, Chest, Arms, Elbows, Hands, Sacrum. Buttock, Coccyx, Ischium, Legs. Feet and Heels, and Under Medical Devices         Does the Patient have a Wound? No noted wound(s)       Abdirashid Prevention initiated by RN: No  Wound Care Orders initiated by RN: No    Pressure Injury (Stage 3,4, Unstageable, DTI, NWPT, and Complex wounds) if present, place Wound referral order by RN under : No    New Ostomies, if present place, Ostomy referral order under : No     Nurse 1 eSignature: Electronically signed by Rebecca Sandoval RN on 3/5/25 at 4:48 AM EST    **SHARE this note so that the co-signing nurse can place an eSignature**    Nurse 2 eSignature: Electronically signed by Lilian Wright RN on 3/5/25 at 4:49 AM EST

## 2025-03-05 NOTE — PROGRESS NOTES
Admitted patient to room 4260 from the emergency room. Patient arrived via stretcher. VS recorded. Patient A&O X4. Patients breathing regular and unlabored, denies pain. Oriented to room, call light, TV, and phone. Son at bedside.  Bed in lowest position and locked, exit alarm in place. Non-slip socks on. ID bracelet on and correct per patient verbally reporting name and date of birth. Call light within reach. Needed items within reach.

## 2025-03-05 NOTE — PROGRESS NOTES
Occupational Therapy  Facility/Department: [address] MED SURG  Occupational Therapy Initial Assessment    Name: Maxx Norman  : 1945  MRN: [id number]  Date of Service: 3/5/2025    Discharge Recommendations:  Home with assist PRN     Maxx Norman scored a 20/24 on the AM-PAC ADL Inpatient form.  At this time, no further OT is recommended upon discharge due to pt nearing baseline function.  Recommend patient returns to prior setting with prior services.       Patient Diagnosis(es): The primary encounter diagnosis was Hypervolemia, unspecified hypervolemia type. Diagnoses of Hyponatremia, Bilateral lower extremity edema, and Acute on chronic heart failure with preserved ejection fraction (HCC) were also pertinent to this visit.  Past Medical History:  has a past medical history of Atrial fibrillation (HCC), Basal cell carcinoma, Bilateral tinnitus, Bradycardia, CAD (coronary artery disease), GERD (gastroesophageal reflux disease), Hyperlipidemia, Hypertension, and Osteoarthritis.  Past Surgical History:  has a past surgical history that includes Coronary angioplasty with stent (); External ear surgery (Right); Skin cancer destruction (2017); A-V cardiac pacemaker insertion; and ep device procedure (N/A, 2024).    Treatment Diagnosis: Decreased: ADLs, functional transfers/mobility      Assessment  Performance deficits / Impairments: Decreased functional mobility ;Decreased ADL status;Decreased endurance  Assessment: Pt is a 80 yo M w/ PMHx a fib who presented with B LE swelling and abnormal labs from his cardiology office. At baseline, pt lives at home with his wife where he is typically independent in self-care and functional mobility. He is just below baseline at this time, requiring up to CGA for functional transfers and mobility, SBA for standing grooming and toileting. Will continue to follow while hospitalized. 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    Objective  Vision  Vision: Impaired  Vision Exceptions: Wears glasses at all times  Hearing  Hearing: Exceptions to WFL  Hearing Exceptions: Hard of hearing/hearing concerns          Safety Devices  Type of Devices: All fall risk precautions in place;Call light within reach;Gait belt;Patient at risk for falls;Left in chair;Chair alarm in place        AROM: Within functional limits  Strength: Within functional limits  ADL  Grooming: Stand by assistance  Grooming Skilled Clinical Factors: Pt washed hands and face, brushed teeth in stance at the sink  Toileting: Stand by assistance  Toileting Skilled Clinical Factors: Pt voided urine in stance over the commode  Functional Mobility: Contact guard assistance  Functional Mobility Skilled Clinical Factors: Pt completed functional mobility from bed>bathroom>recliner CGA without a device. Pt declined further ambulation  Additional Comments: Anticipate pt would be IND w/ feeding, setup UB ADLs, min-mod A LB ADLs based on ROM, strength, endurance this session     Activity Tolerance  Activity Tolerance: Patient limited by fatigue;Patient limited by endurance  Bed mobility  Supine to Sit: Unable to assess  Sit to Supine: Unable to assess  Bed Mobility Comments: In recliner at start and end of session  Transfers  Sit to stand: Stand by assistance;Contact guard assistance  Stand to sit: Stand by assistance  Transfer Comments: no device  Vision  Vision: Impaired  Vision Exceptions: Wears glasses at all times  Hearing  Hearing: Exceptions to WFL  Hearing Exceptions: Hard of hearing/hearing concerns  Cognition  Overall Cognitive Status: WFL  Orientation  Overall Orientation Status: Within Functional Limits                  Education Given To: Patient  Education Provided: Role of Therapy;Plan of Care;ADL Adaptive Strategies;Transfer  Training  Education Method: Verbal;Demonstration  Barriers to Learning: Hearing  Education Outcome: Continued education needed;Verbalized understanding     AM-PAC - ADL  AM-PAC Daily Activity - Inpatient   How much help is needed for putting on and taking off regular lower body clothing?: A Little  How much help is needed for bathing (which includes washing, rinsing, drying)?: A Little  How much help is needed for toileting (which includes using toilet, bedpan, or urinal)?: A Little  How much help is needed for putting on and taking off regular upper body clothing?: None  How much help is needed for taking care of personal grooming?: A Little  How much help for eating meals?: None  AM-PAC Inpatient Daily Activity Raw Score: 20  AM-PAC Inpatient ADL T-Scale Score : 42.03  ADL Inpatient CMS 0-100% Score: 38.32  ADL Inpatient CMS G-Code Modifier : CJ      Goals  Short Term Goals  Time Frame for Short Term Goals: Prior to d/c  Short Term Goal 1: Pt will complete ADL transfers w/ supervision  Short Term Goal 2: Pt will toilet w/ supervision  Short Term Goal 3: Pt will bathe w/ supervision  Short Term Goal 4: Pt will groom in stance at sink w/ supervision  Long Term Goals  Time Frame for Long Term Goals : LTG=STG  Patient Goals   Patient goals : to return home      Therapy Time   Individual Concurrent Group Co-treatment   Time In 1045         Time Out 1102         Minutes 17         Timed Code Treatment Minutes: 0 Minutes       Meche Grey OTR/L [id number]

## 2025-03-05 NOTE — PLAN OF CARE
Problem: Discharge Planning  Goal: Discharge to home or other facility with appropriate resources  Outcome: Progressing  Flowsheets (Taken 3/5/2025 0412)  Discharge to home or other facility with appropriate resources:   Identify barriers to discharge with patient and caregiver   Identify discharge learning needs (meds, wound care, etc)   Arrange for needed discharge resources and transportation as appropriate   Arrange for interpreters to assist at discharge as needed     Problem: Safety - Adult  Goal: Free from fall injury  Outcome: Progressing     Problem: Respiratory - Adult  Goal: Achieves optimal ventilation and oxygenation  Outcome: Progressing  Flowsheets (Taken 3/5/2025 0412)  Achieves optimal ventilation and oxygenation:   Assess for changes in respiratory status   Position to facilitate oxygenation and minimize respiratory effort   Initiate smoking cessation protocol as indicated   Assess for changes in mentation and behavior   Oxygen supplementation based on oxygen saturation or arterial blood gases     Problem: Metabolic/Fluid and Electrolytes - Adult  Goal: Electrolytes maintained within normal limits  Outcome: Progressing  Flowsheets (Taken 3/5/2025 0412)  Electrolytes maintained within normal limits:   Monitor labs and assess patient for signs and symptoms of electrolyte imbalances   Administer electrolyte replacement as ordered   Monitor response to electrolyte replacements, including repeat lab results as appropriate   Fluid restriction as ordered   Instruct patient on fluid and nutrition restrictions as appropriate     Problem: Metabolic/Fluid and Electrolytes - Adult  Goal: Hemodynamic stability and optimal renal function maintained  Outcome: Progressing  Flowsheets (Taken 3/5/2025 0412)  Hemodynamic stability and optimal renal function maintained:   Monitor labs and assess for signs and symptoms of volume excess or deficit   Monitor intake, output and patient weight   Monitor urine specific  gravity, serum osmolarity and serum sodium as indicated or ordered   Monitor response to interventions for patient's volume status, including labs, urine output, blood pressure (other measures as available)   Encourage oral intake as appropriate

## 2025-03-05 NOTE — CARE COORDINATION
Chart review done rounds completed. Here for HR, diuresising, likely dc 1-2 days, rec for HC, sent referral to [facility].   Will need HC orders.     Preet Martin LMSW, [facility] Social Work Case Management   Phone: [phone]  Fax: [phone]

## 2025-03-06 ENCOUNTER — APPOINTMENT (OUTPATIENT)
Dept: ULTRASOUND IMAGING | Age: 80
DRG: 291 | End: 2025-03-06
Payer: MEDICARE

## 2025-03-06 ENCOUNTER — APPOINTMENT (OUTPATIENT)
Dept: CT IMAGING | Age: 80
DRG: 291 | End: 2025-03-06
Payer: MEDICARE

## 2025-03-06 VITALS
TEMPERATURE: 97.3 F | WEIGHT: 186.73 LBS | BODY MASS INDEX: 23.96 KG/M2 | OXYGEN SATURATION: 97 % | HEIGHT: 74 IN | DIASTOLIC BLOOD PRESSURE: 68 MMHG | HEART RATE: 60 BPM | SYSTOLIC BLOOD PRESSURE: 126 MMHG | RESPIRATION RATE: 16 BRPM

## 2025-03-06 LAB
ALBUMIN SERPL-MCNC: 3.7 G/DL (ref 3.4–5)
ALP SERPL-CCNC: 156 U/L (ref 40–129)
ALT SERPL-CCNC: 54 U/L (ref 10–40)
ANION GAP SERPL CALCULATED.3IONS-SCNC: 11 MMOL/L (ref 3–16)
AST SERPL-CCNC: 95 U/L (ref 15–37)
BASOPHILS # BLD: 0.7 K/UL (ref 0–0.2)
BASOPHILS NFR BLD: 3 %
BILIRUB DIRECT SERPL-MCNC: 1.1 MG/DL (ref 0–0.3)
BILIRUB INDIRECT SERPL-MCNC: 1.2 MG/DL (ref 0–1)
BILIRUB SERPL-MCNC: 2.3 MG/DL (ref 0–1)
BUN SERPL-MCNC: 9 MG/DL (ref 7–20)
CALCIUM SERPL-MCNC: 9.7 MG/DL (ref 8.3–10.6)
CHLORIDE SERPL-SCNC: 93 MMOL/L (ref 99–110)
CO2 SERPL-SCNC: 30 MMOL/L (ref 21–32)
CREAT SERPL-MCNC: 1.1 MG/DL (ref 0.8–1.3)
DEPRECATED RDW RBC AUTO: 16 % (ref 12.4–15.4)
EOSINOPHIL # BLD: 0.4 K/UL (ref 0–0.6)
EOSINOPHIL NFR BLD: 2 %
GFR SERPLBLD CREATININE-BSD FMLA CKD-EPI: 68 ML/MIN/{1.73_M2}
GLUCOSE SERPL-MCNC: 90 MG/DL (ref 70–99)
HCT VFR BLD AUTO: 36.4 % (ref 40.5–52.5)
HGB BLD-MCNC: 12.5 G/DL (ref 13.5–17.5)
LYMPHOCYTES # BLD: 3.8 K/UL (ref 1–5.1)
LYMPHOCYTES NFR BLD: 17 %
MCH RBC QN AUTO: 34.6 PG (ref 26–34)
MCHC RBC AUTO-ENTMCNC: 34.2 G/DL (ref 31–36)
MCV RBC AUTO: 101 FL (ref 80–100)
METAMYELOCYTES NFR BLD MANUAL: 8 %
MONOCYTES # BLD: 1.3 K/UL (ref 0–1.3)
MONOCYTES NFR BLD: 6 %
MYELOCYTES NFR BLD MANUAL: 3 %
NEUTROPHILS # BLD: 15.9 K/UL (ref 1.7–7.7)
NEUTROPHILS NFR BLD: 42 %
NEUTS BAND NFR BLD MANUAL: 17 % (ref 0–7)
PATH INTERP BLD-IMP: NO
PLATELET # BLD AUTO: 906 K/UL (ref 135–450)
PMV BLD AUTO: 8.3 FL (ref 5–10.5)
POTASSIUM SERPL-SCNC: 3.8 MMOL/L (ref 3.5–5.1)
PROMYELOCYTES NFR BLD MANUAL: 2 %
PROT SERPL-MCNC: 6.4 G/DL (ref 6.4–8.2)
RBC # BLD AUTO: 3.61 M/UL (ref 4.2–5.9)
SODIUM SERPL-SCNC: 134 MMOL/L (ref 136–145)
WBC # BLD AUTO: 22.1 K/UL (ref 4–11)

## 2025-03-06 PROCEDURE — 88237 TISSUE CULTURE BONE MARROW: CPT

## 2025-03-06 PROCEDURE — 99232 SBSQ HOSP IP/OBS MODERATE 35: CPT | Performed by: INTERNAL MEDICINE

## 2025-03-06 PROCEDURE — 74176 CT ABD & PELVIS W/O CONTRAST: CPT

## 2025-03-06 PROCEDURE — 85025 COMPLETE CBC W/AUTO DIFF WBC: CPT

## 2025-03-06 PROCEDURE — 76705 ECHO EXAM OF ABDOMEN: CPT

## 2025-03-06 PROCEDURE — 6370000000 HC RX 637 (ALT 250 FOR IP): Performed by: INTERNAL MEDICINE

## 2025-03-06 PROCEDURE — 88271 CYTOGENETICS DNA PROBE: CPT

## 2025-03-06 PROCEDURE — 80076 HEPATIC FUNCTION PANEL: CPT

## 2025-03-06 PROCEDURE — 6370000000 HC RX 637 (ALT 250 FOR IP): Performed by: STUDENT IN AN ORGANIZED HEALTH CARE EDUCATION/TRAINING PROGRAM

## 2025-03-06 PROCEDURE — 94760 N-INVAS EAR/PLS OXIMETRY 1: CPT

## 2025-03-06 PROCEDURE — 88275 CYTOGENETICS 100-300: CPT

## 2025-03-06 PROCEDURE — 2500000003 HC RX 250 WO HCPCS: Performed by: STUDENT IN AN ORGANIZED HEALTH CARE EDUCATION/TRAINING PROGRAM

## 2025-03-06 PROCEDURE — 36415 COLL VENOUS BLD VENIPUNCTURE: CPT

## 2025-03-06 PROCEDURE — 80048 BASIC METABOLIC PNL TOTAL CA: CPT

## 2025-03-06 PROCEDURE — 81219 CALR GENE COM VARIANTS: CPT

## 2025-03-06 PROCEDURE — 97530 THERAPEUTIC ACTIVITIES: CPT

## 2025-03-06 PROCEDURE — 6360000002 HC RX W HCPCS: Performed by: STUDENT IN AN ORGANIZED HEALTH CARE EDUCATION/TRAINING PROGRAM

## 2025-03-06 RX ORDER — TORSEMIDE 20 MG/1
20 TABLET ORAL
Qty: 30 TABLET | Refills: 0 | Status: ON HOLD | OUTPATIENT
Start: 2025-03-06 | End: 2025-03-20 | Stop reason: HOSPADM

## 2025-03-06 RX ORDER — LISINOPRIL 40 MG/1
40 TABLET ORAL DAILY
Qty: 90 TABLET | Refills: 0 | Status: SHIPPED | OUTPATIENT
Start: 2025-03-06

## 2025-03-06 RX ORDER — HYDROXYUREA 500 MG/1
500 CAPSULE ORAL DAILY
Qty: 30 CAPSULE | Refills: 0 | Status: ON HOLD | OUTPATIENT
Start: 2025-03-07 | End: 2025-03-21 | Stop reason: HOSPADM

## 2025-03-06 RX ORDER — CHLORTHALIDONE 25 MG/1
25 TABLET ORAL DAILY
Qty: 30 TABLET | Refills: 0 | Status: ON HOLD | OUTPATIENT
Start: 2025-03-07 | End: 2025-03-20 | Stop reason: HOSPADM

## 2025-03-06 RX ORDER — SPIRONOLACTONE 25 MG/1
25 TABLET ORAL DAILY
Qty: 30 TABLET | Refills: 3 | Status: ON HOLD | OUTPATIENT
Start: 2025-03-07 | End: 2025-03-20 | Stop reason: HOSPADM

## 2025-03-06 RX ORDER — ATORVASTATIN CALCIUM 40 MG/1
TABLET, FILM COATED ORAL
Qty: 90 TABLET | Refills: 0 | Status: SHIPPED | OUTPATIENT
Start: 2025-03-06

## 2025-03-06 RX ORDER — HYDROXYUREA 500 MG/1
500 CAPSULE ORAL DAILY
Status: DISCONTINUED | OUTPATIENT
Start: 2025-03-06 | End: 2025-03-06 | Stop reason: HOSPADM

## 2025-03-06 RX ADMIN — SODIUM CHLORIDE, PRESERVATIVE FREE 10 ML: 5 INJECTION INTRAVENOUS at 08:45

## 2025-03-06 RX ADMIN — FUROSEMIDE 40 MG: 10 INJECTION, SOLUTION INTRAMUSCULAR; INTRAVENOUS at 08:45

## 2025-03-06 RX ADMIN — CHLORTHALIDONE 25 MG: 25 TABLET ORAL at 08:45

## 2025-03-06 RX ADMIN — LISINOPRIL 40 MG: 40 TABLET ORAL at 08:45

## 2025-03-06 RX ADMIN — RIVAROXABAN 20 MG: 20 TABLET, FILM COATED ORAL at 08:45

## 2025-03-06 RX ADMIN — HYDROXYUREA 500 MG: 500 CAPSULE ORAL at 10:51

## 2025-03-06 RX ADMIN — SPIRONOLACTONE 25 MG: 25 TABLET ORAL at 08:45

## 2025-03-06 NOTE — PROGRESS NOTES
[facility]  Cardiology progress note        CC:      Hyponatremia/lower extremity edema             HPI:   This is a 79 y.o. male with a history of CAD with stents to the LAD in 2008, new onset A-fib, bradycardia status post pacemaker.  In addition has hypertension and HLD    He was seen by Conchis Locke, nurse practitioner for bilateral lower extremity edema, abdominal bloating.  She ordered blood work which indicated severe hyponatremia which is the reason why I requested that he be admitted to the hospital.    Admission blood work showed sodium of 120 proBNP of 773, elevated liver enzymes particularly  along with alkaline phosphatase.  The patient also has markedly elevated white count and platelet counts      Interval history  No complaints  Laying comfortably in bed  Lower extremity edema resolved  Past Medical History:   Diagnosis Date    Atrial fibrillation (HCC)     Basal cell carcinoma     right ear and right cheek    Bilateral tinnitus 2005    Bradycardia     required pacemaker     CAD (coronary artery disease)     4 stents placed     GERD (gastroesophageal reflux disease)     Hyperlipidemia     Hypertension     Osteoarthritis       Past Surgical History:   Procedure Laterality Date    A-V CARDIAC PACEMAKER INSERTION      bradycardia     CORONARY ANGIOPLASTY WITH STENT PLACEMENT  2008    EP DEVICE PROCEDURE N/A 7/22/2024    Insert PPM dual performed by Shankar Carballo MD at [facility] CARDIAC CATH LAB    EXTERNAL EAR SURGERY Right     removed basal cell cancer    SKIN CANCER DESTRUCTION  03/2017    Right cheek      Family History   Problem Relation Age of Onset    Arthritis Mother     Heart Disease Father     Stroke Father     Other Brother         couldnt walk and in nursing home  disable     No Known Problems Maternal Grandmother     High Blood Pressure Maternal Grandfather     No Known Problems Paternal Grandmother     No Known Problems Paternal Grandfather       Social History     Tobacco Use   No rash seen        Labs:   Recent Labs     03/05/25  0536 03/06/25  0426   WBC 20.5* 22.1*   HGB 11.7* 12.5*   HCT 34.9* 36.4*   * 906*     Recent Labs     03/05/25  0536 03/06/25  0426   * 134*   K 3.8 3.8   CO2 25 30   BUN 9 9   CREATININE 1.0 1.1   GLUCOSE 99 90     No results for input(s): \"BNP\" in the last 72 hours.        EKG: Personally reviewed.  3/4/2025  V paced rhythm      ECHO: 3/4/2025    Left Ventricle: Hyperdynamic left ventricular systolic function. EF by visual approximation is 70%. Left ventricle size is normal. Mildly increased wall thickness. Normal wall motion. Indeterminate diastolic function due to arrhythmia.    Right Ventricle: Right ventricle size is normal. Lead present in the right ventricle.    Aortic Valve: Trileaflet valve. Mild sclerosis of the aortic valve cusps.    Mitral Valve: Trace regurgitation.    Tricuspid Valve: Mild regurgitation. Mildly elevated RVSP, consistent with mild pulmonary hypertension. The estimated RVSP is 44 mmHg.    Left Atrium: Left atrium is moderately dilated. LA Vol Index is  43 ml/m2 mL/m2.    Right Atrium: Lead present in the right atrium. Right atrium is moderately dilated.    Pericardium: Trivial localized pericardial effusion present around the right ventricle and right atrium. No indication of cardiac tamponade.    CT abdomen  Small volume ascites in the abdomen.  No acute abnormality identified  involving the liver on this noncontrast exam.  No evidence of hepatic  steatosis.  Further characterization with MRI or with elastography may be  helpful if additional evaluation is warranted..     Diverticulosis without evidence of diverticulitis.     Mild urinary retention involving the bladder.  Mural thickening of the  bladder wall related to chronic bladder outlet obstruction versus cystitis.  Correlation with urine laboratory exams may be helpful.     Probable hemorrhagic or proteinaceous cyst involving the lower pole the left  kidney.      Vascular disease    ASSESSMENT AND PLAN:      Lower extremity edema  Echo shows normal LV size and function, normal RV and mild TR  Patient on amlodipine  Although amlodipine can contribute I doubt if that is the cause of this lower extremity edema  Recommend torsemide 20 mg 2 days a week      Hypertension  On amlodipine lisinopril  Recommend discontinuing amlodipine and switching to chlorthalidone plus spironolactone    Atrial fibrillation  Controlled rate  Has a pacemaker  On Xarelto 20    Elevated white blood count and platelet count  Seen by hematology  Suspicious for myeloproliferative disease      CT of the abdomen rule out hepatic steatosis   No evidence of biliary tract obstruction  Small amount of ascites        TRISH Reyes M.D  3/6/2025

## 2025-03-06 NOTE — PLAN OF CARE
Problem: Chronic Conditions and Co-morbidities  Goal: Patient's chronic conditions and co-morbidity symptoms are monitored and maintained or improved  3/6/2025 0123 by Rebecca Sandoval RN  Outcome: Progressing  Flowsheets (Taken 3/6/2025 0123)  Care Plan - Patient's Chronic Conditions and Co-Morbidity Symptoms are Monitored and Maintained or Improved:   Monitor and assess patient's chronic conditions and comorbid symptoms for stability, deterioration, or improvement   Collaborate with multidisciplinary team to address chronic and comorbid conditions and prevent exacerbation or deterioration  3/5/2025 1744 by Emy Rodríguez, RN  Outcome: Progressing     Problem: Discharge Planning  Goal: Discharge to home or other facility with appropriate resources  3/6/2025 0123 by Rebecca Sandoval RN  Outcome: Progressing  Flowsheets (Taken 3/6/2025 0123)  Discharge to home or other facility with appropriate resources:   Identify discharge learning needs (meds, wound care, etc)   Identify barriers to discharge with patient and caregiver   Arrange for needed discharge resources and transportation as appropriate   Arrange for interpreters to assist at discharge as needed  3/5/2025 1744 by Emy Rodríguez, RN  Outcome: Progressing     Problem: Metabolic/Fluid and Electrolytes - Adult  Goal: Electrolytes maintained within normal limits  3/6/2025 0123 by Rebecca Sandoval RN  Outcome: Progressing  Flowsheets (Taken 3/6/2025 0123)  Electrolytes maintained within normal limits:   Administer electrolyte replacement as ordered   Monitor labs and assess patient for signs and symptoms of electrolyte imbalances   Monitor response to electrolyte replacements, including repeat lab results as appropriate   Fluid restriction as ordered   Instruct patient on fluid and nutrition restrictions as appropriate  3/5/2025 1744 by Emy Rodríguez, RN  Outcome: Progressing     Problem: Metabolic/Fluid and Electrolytes - Adult  Goal: Hemodynamic stability  and optimal renal function maintained  3/6/2025 0123 by Rebecca Sandoval, RN  Outcome: Progressing  Flowsheets (Taken 3/6/2025 0123)  Hemodynamic stability and optimal renal function maintained:   Monitor labs and assess for signs and symptoms of volume excess or deficit   Monitor intake, output and patient weight   Monitor urine specific gravity, serum osmolarity and serum sodium as indicated or ordered   Monitor response to interventions for patient's volume status, including labs, urine output, blood pressure (other measures as available)   Encourage oral intake as appropriate   Instruct patient on fluid and nutrition restrictions as appropriate  3/5/2025 1744 by Emy Rodríguez, RN  Outcome: Progressing     Problem: Safety - Adult  Goal: Free from fall injury  3/6/2025 0123 by Rebecca Sandoval, RN  Outcome: Progressing  3/5/2025 1744 by Emy Rodríguez, RN  Outcome: Progressing

## 2025-03-06 NOTE — PLAN OF CARE
Problem: Chronic Conditions and Co-morbidities  Goal: Patient's chronic conditions and co-morbidity symptoms are monitored and maintained or improved  3/6/2025 1434 by Emy Rodríguez RN  Outcome: Completed  3/6/2025 1133 by Emy Rodríguez RN  Outcome: Progressing  3/6/2025 0138 by Rebecca Sandoval RN  Outcome: Progressing  Flowsheets (Taken 3/6/2025 0138)  Care Plan - Patient's Chronic Conditions and Co-Morbidity Symptoms are Monitored and Maintained or Improved:   Monitor and assess patient's chronic conditions and comorbid symptoms for stability, deterioration, or improvement   Collaborate with multidisciplinary team to address chronic and comorbid conditions and prevent exacerbation or deterioration  3/6/2025 0123 by Rebecca Sandoval RN  Outcome: Progressing  Flowsheets (Taken 3/6/2025 0123)  Care Plan - Patient's Chronic Conditions and Co-Morbidity Symptoms are Monitored and Maintained or Improved:   Monitor and assess patient's chronic conditions and comorbid symptoms for stability, deterioration, or improvement   Collaborate with multidisciplinary team to address chronic and comorbid conditions and prevent exacerbation or deterioration     Problem: Discharge Planning  Goal: Discharge to home or other facility with appropriate resources  3/6/2025 1434 by Emy Rodríguez RN  Outcome: Completed  3/6/2025 1133 by Emy Rodríguez RN  Outcome: Progressing  3/6/2025 0138 by Rebecca Sandoval RN  Outcome: Progressing  Flowsheets (Taken 3/6/2025 0138)  Discharge to home or other facility with appropriate resources:   Identify barriers to discharge with patient and caregiver   Identify discharge learning needs (meds, wound care, etc)   Arrange for needed discharge resources and transportation as appropriate   Arrange for interpreters to assist at discharge as needed  3/6/2025 0123 by Rebecca Sandoval RN  Outcome: Progressing  Flowsheets (Taken 3/6/2025 0123)  Discharge to home or other facility with appropriate  output and patient weight   Monitor urine specific gravity, serum osmolarity and serum sodium as indicated or ordered   Monitor response to interventions for patient's volume status, including labs, urine output, blood pressure (other measures as available)   Encourage oral intake as appropriate   Instruct patient on fluid and nutrition restrictions as appropriate

## 2025-03-06 NOTE — PROGRESS NOTES
CLINICAL PHARMACY NOTE: MEDS TO BEDS    Total # of Prescriptions Filled: 4   The following medications were delivered to the patient:  Current Discharge Medication List        START taking these medications    Details   chlorthalidone (HYGROTON) 25 MG tablet Take 1 tablet by mouth daily  Qty: 30 tablet, Refills: 0      spironolactone (ALDACTONE) 25 MG tablet Take 1 tablet by mouth daily  Qty: 30 tablet, Refills: 3      torsemide (DEMADEX) 20 MG tablet Take 1 tablet by mouth Twice a Week  Qty: 30 tablet, Refills: 0      hydroxyurea (HYDREA) 500 MG chemo capsule Take 1 capsule by mouth daily  Qty: 30 capsule, Refills: 0               Additional Documentation:pt picked up in retail

## 2025-03-06 NOTE — PLAN OF CARE
Problem: Chronic Conditions and Co-morbidities  Goal: Patient's chronic conditions and co-morbidity symptoms are monitored and maintained or improved  3/6/2025 1133 by Emy Rodríguez RN  Outcome: Progressing  3/6/2025 0138 by Rebecca Sandoval RN  Outcome: Progressing  Flowsheets (Taken 3/6/2025 0138)  Care Plan - Patient's Chronic Conditions and Co-Morbidity Symptoms are Monitored and Maintained or Improved:   Monitor and assess patient's chronic conditions and comorbid symptoms for stability, deterioration, or improvement   Collaborate with multidisciplinary team to address chronic and comorbid conditions and prevent exacerbation or deterioration  3/6/2025 0123 by Rebecca Sandoval RN  Outcome: Progressing  Flowsheets (Taken 3/6/2025 0123)  Care Plan - Patient's Chronic Conditions and Co-Morbidity Symptoms are Monitored and Maintained or Improved:   Monitor and assess patient's chronic conditions and comorbid symptoms for stability, deterioration, or improvement   Collaborate with multidisciplinary team to address chronic and comorbid conditions and prevent exacerbation or deterioration     Problem: Discharge Planning  Goal: Discharge to home or other facility with appropriate resources  3/6/2025 1133 by Emy Rodríguez RN  Outcome: Progressing  3/6/2025 0138 by Rebecca Sandoval RN  Outcome: Progressing  Flowsheets (Taken 3/6/2025 0138)  Discharge to home or other facility with appropriate resources:   Identify barriers to discharge with patient and caregiver   Identify discharge learning needs (meds, wound care, etc)   Arrange for needed discharge resources and transportation as appropriate   Arrange for interpreters to assist at discharge as needed  3/6/2025 0123 by Rebecca Sandoval RN  Outcome: Progressing  Flowsheets (Taken 3/6/2025 0123)  Discharge to home or other facility with appropriate resources:   Identify discharge learning needs (meds, wound care, etc)   Identify barriers to discharge with patient and  appropriate

## 2025-03-06 NOTE — DISCHARGE SUMMARY
V2.0  Discharge Summary    Name:  Maxx Norman /Age/Sex: 1945 (79 y.o. male)   Admit Date: 3/4/2025  Discharge Date: 3/6/25    MRN & CSN:  [id number] & [id number] Encounter Date and Time 3/6/25 1:44 PM EST    Attending:  Sergio Espinal MD Discharging Provider: Sergio Epsinal MD       Hospital Course:     Brief HPI: Maxx Norman is a 79 y.o. male who presented with lower extremity swelling and abnormal labs    Brief Problem Based Course:   Lower extremity swelling: Patient presented to hospital with fever progressive lower extremity swelling.  Was evaluated at his cardiologist's office who ordered blood work and then referred patient to the ED for admission.  Patient was treated with diuretics with improvement in lower extremity swelling.  Echocardiogram did not reveal any cardiac dysfunction and cardiology did not feel that patient's swelling was due to heart failure.  Patient will continue on oral diuretics at discharge and recommended conservative measures such as lower extremity elevation and compression stockings  Hyponatremia: Suspected due to volume overload.  Resolved with diuresis  Suspected myelodysplastic syndrome or hematologic malignancy: Patient with significant leukocytosis and thrombocytosis noted on labs.  On chart review this appears to be present last year in July when patient presented for pacemaker placement, although not as severe.  Heme-onc was consulted and ordered serologic workup.  Differential included CML and essential thrombocythemia.  Patient will follow-up with heme-onc as outpatient regarding ordered laboratory workup  Abnormal liver enzymes patient had mild to moderate liver enzyme elevation ultrasound liver was read as diffuse hepatic steatosis versus diffuse hepatocellular disease.  CT imaging was unrevealing.  Patient was recommended outpatient follow-up with GI/hepatology      The patient expressed appropriate understanding of, and agreement with the discharge  Peritoneum/Retroperitoneum: No retroperitoneal mass or adenopathy Small umbilical fat containing hernia. Bones/Soft Tissues: Spondylosis of the lumbar and imaged thoracic spine. Reversal normal lumbar spine lordosis.  Bridging osteophyte anterior to the right SI joint.     Small volume ascites in the abdomen.  No acute abnormality identified involving the liver on this noncontrast exam.  No evidence of hepatic steatosis.  Further characterization with MRI or with elastography may be helpful if additional evaluation is warranted.. Diverticulosis without evidence of diverticulitis. Mild urinary retention involving the bladder.  Mural thickening of the bladder wall related to chronic bladder outlet obstruction versus cystitis. Correlation with urine laboratory exams may be helpful. Probable hemorrhagic or proteinaceous cyst involving the lower pole the left kidney. Vascular disease. Additional findings noted above.     US GALLBLADDER RUQ    Result Date: 3/6/2025  EXAMINATION: RIGHT UPPER QUADRANT ULTRASOUND 3/6/2025 7:26 am COMPARISON: None. HISTORY: ORDERING SYSTEM PROVIDED HISTORY: elevated liver enzymes, LE swelling TECHNOLOGIST PROVIDED HISTORY: Reason for exam:->elevated liver enzymes, LE swelling FINDINGS: LIVER:  The liver is somewhat echogenic in appearance, which may suggest either fatty infiltration or diffuse hepatocellular disease.  However, there is no discrete intrahepatic nodule or mass.  There is no evidence of intrahepatic biliary ductal dilatation. BILIARY SYSTEM: Gallbladder is unremarkable without evidence of pericholecystic fluid, wall thickening or stones. Negative sonographic Hinds's sign.  Common bile duct is within normal limits measuring 4.3 mm. RIGHT KIDNEY: The right kidney is grossly unremarkable without evidence of hydronephrosis. The right kidney measures 10.0 x 4.1 x 5.7 cm. PANCREAS:  Visualized portions of the pancreas are unremarkable. OTHER: No evidence of right upper quadrant

## 2025-03-06 NOTE — PROGRESS NOTES
Pt discharged home. Discharge instructions reviewed with pt, all questions answered. Intact IV removed without any complications. Belongings sent home with pt.

## 2025-03-06 NOTE — CARE COORDINATION
CASE MANAGEMENT DISCHARGE SUMMARY:    DISCHARGE DATE: 3/6/25    DISCHARGED TO HOME     Discharging to Facility/ Agency   Name:  [facility]    Address: [address] [place] [address]  Phone: [phone]  Fax: [phone]      TRANSPORTATION: family             TIME: this PM     Preet Martin LMSW, [facility] Work Case Management   Phone: [phone]  Fax: [phone]   Electronically signed by LADONNA Abad on 3/6/2025 at 2:31 PM

## 2025-03-06 NOTE — NURSE NAVIGATOR
Discharge order noted. There is a follow up appointment scheduled with   Dr. Reyes, 3/13/25 @ 1240 pm  Bedside RN to review with patient when reviewing AVS.   DC wt 186 lbs standing  GDMT has been addressed.

## 2025-03-06 NOTE — PROGRESS NOTES
ONCOLOGY HEMATOLOGY CARE PROGRESS NOTE      SUBJECTIVE:  Pt doing ok   But plts up   Also now with more left shift suspect cml vs et   Flow neg     ROS:     Constitutional:  No weight loss, No fever, No chills, No night sweats.  Energy level good.  Eyes:  No impairment or change in vision  ENT / Mouth:  No pain, abnormal ulceration, bleeding, nasal drip or change in voice or hearing  Cardiovascular:  No chest pain, palpitations, new edema, or calf discomfort  Respiratory:  No pain, hemoptysis, change to breathing  Breast:  No pain, discharge, change in appearance or texture  Gastrointestinal:  No pain, cramping, jaundice, change to eating and bowel habits  Urinary:  No pain, bleeding or change in continence  Genitalia: No pain, bleeding or discharge  Musculoskeletal:  No redness, pain, edema or weakness  Skin:  No pruritus, rash, change to nodules or lesions  Neurologic:  No discomfort, change in mental status, speech, sensory or motor activity  Psychiatric:  No change in concentration or change to affect or mood  Endocrine:  No hot flashes, increased thirst, or change to urine production  Hematologic: No petechiae, ecchymosis or bleeding  Lymphatic:  No lymphadenopathy or lymphedema  Allergy / Immunologic:  No eczema, hives, frequent or recurrent infections    OBJECTIVE        Physical    VITALS:  Patient Vitals for the past 24 hrs:   BP Temp Temp src Pulse Resp SpO2 Weight   03/06/25 0830 -- -- -- -- -- 95 % --   03/06/25 0739 (!) 142/68 97.3 °F (36.3 °C) Oral 70 -- 97 % --   03/06/25 0519 133/61 97.3 °F (36.3 °C) Oral 64 16 98 % 84.7 kg (186 lb 11.7 oz)   03/06/25 0326 -- -- Oral -- -- -- --   03/06/25 0027 (!) 142/67 -- -- 61 -- 94 % --   03/06/25 0026 (!) 145/66 98.2 °F (36.8 °C) -- 62 16 96 % --   03/05/25 1945 (!) 153/74 97.9 °F (36.6 °C) Oral 66 18 98 % --   03/05/25 1554 (!) 145/71 97.3 °F (36.3 °C) Oral 61 16 99 % --   03/05/25 1222 137/71 97.7 °F (36.5 °C) Oral 60 17  2.3* 2.1* 1.8* 1.5*   ALKPHOS 156* 156* 169* 196*   ALT 54* 49* 57* 64*   AST 95* 104* 116* 125*       Lab Results   Component Value Date    CALCIUM 9.7 03/06/2025    PHOS 3.2 03/05/2025       LDH:No results for input(s): \"LDH\" in the last 720 hours.    Radiology Review:  US GALLBLADDER RUQ  Narrative: EXAMINATION:  RIGHT UPPER QUADRANT ULTRASOUND    3/6/2025 7:26 am    COMPARISON:  None.    HISTORY:  ORDERING SYSTEM PROVIDED HISTORY: elevated liver enzymes, LE swelling  TECHNOLOGIST PROVIDED HISTORY:    Reason for exam:->elevated liver enzymes, LE swelling    FINDINGS:  LIVER:  The liver is somewhat echogenic in appearance, which may suggest  either fatty infiltration or diffuse hepatocellular disease.  However, there  is no discrete intrahepatic nodule or mass.  There is no evidence of  intrahepatic biliary ductal dilatation.    BILIARY SYSTEM: Gallbladder is unremarkable without evidence of  pericholecystic fluid, wall thickening or stones. Negative sonographic  Hinds's sign.  Common bile duct is within normal limits measuring 4.3 mm.    RIGHT KIDNEY: The right kidney is grossly unremarkable without evidence of  hydronephrosis. The right kidney measures 10.0 x 4.1 x 5.7 cm.    PANCREAS:  Visualized portions of the pancreas are unremarkable.    OTHER: No evidence of right upper quadrant ascites.  Impression: 1. Echogenic appearance of the liver parenchyma, suggestive of either diffuse  hepatic steatosis or diffuse hepatocellular disease.  Clinical correlation is  advised.  2. Otherwise, unremarkable right upper quadrant ultrasound.      Problem List  Patient Active Problem List   Diagnosis    Hypercholesterolemia    Benign hypertension    CAD (coronary artery disease)    CAD (coronary artery disease)    BCC (basal cell carcinoma of skin)    Vision changes    Occipital infarction (HCC)    Hearing loss of both ears    Subjective tinnitus of both ears    Neck mass    Vision loss of right eye    Atrial fibrillation  with slow ventricular response (HCC)    Former tobacco use    Acute bacterial sinusitis    Bradycardia with 31-40 beats per minute    Pacemaker    Essential hypertension    Peripheral vertigo    Vertigo    SSS (sick sinus syndrome) (HCC)    Localized edema    Acute on chronic heart failure with preserved ejection fraction (HCC)       ASSESSMENT AND PLAN:  Leukocytosis  thrombocytosis   Suspect he either has cml or ET   BCR abl is ordered and pendin   Since his plts are up to 900 and isnt iron deficient nor overtly inflammed with infection I went ahead and started Hydrea 500mg daily   It can help either way if its cml or et and we can start lowering his plts this way   If he has cml we will transition him to another med for this outpt   That test wont come back for a few day thus will need outpt fu   I told pt that hydrea is an old chemo med but well tolerated -it merely functions to start to lower his counts -once pts get close to 1 million I tend to start this before I know dx   We can likely avoid a bone marrow as long as the peripheral blood tests give me an answer-if not we can do a marrow outpt     I would send home on hydrea daily 500mg for now               ONCOLOGIC DISPOSITION:      Roseline Pablo MD  Please contact through [employer]

## 2025-03-06 NOTE — PROGRESS NOTES
Physical Therapy  Facility/Department: [address] MED SURG  Physical Therapy Treatment Note    Name: Maxx Norman  : 1945  MRN: [id number]  Date of Service: 3/6/2025    Discharge Recommendations:  Home with assist PRN          Patient Diagnosis(es): The primary encounter diagnosis was Hypervolemia, unspecified hypervolemia type. Diagnoses of Hyponatremia, Bilateral lower extremity edema, and Acute on chronic heart failure with preserved ejection fraction (HCC) were also pertinent to this visit.  Past Medical History:  has a past medical history of Atrial fibrillation (HCC), Basal cell carcinoma, Bilateral tinnitus, Bradycardia, CAD (coronary artery disease), GERD (gastroesophageal reflux disease), Hyperlipidemia, Hypertension, and Osteoarthritis.  Past Surgical History:  has a past surgical history that includes Coronary angioplasty with stent (); External ear surgery (Right); Skin cancer destruction (2017); A-V cardiac pacemaker insertion; and ep device procedure (N/A, 2024).    Assessment  Body Structures, Functions, Activity Limitations Requiring Skilled Therapeutic Intervention: Decreased functional mobility ;Decreased safe awareness;Decreased endurance;Decreased balance;Decreased strength  Assessment: He was admitted on 3/4/25 for sob /chf but was found to have a high wbc and high plt count. Prior to admission, pt living with spouse in home setting, independent with ADLs and ambulatory without device. Pt currently functioning near baseline. Anticipate return home with PRN assist.  Treatment Diagnosis: impaired mobility  Therapy Prognosis: Good  Decision Making: Medium Complexity  History: see above  Exam: see below  Clinical Presentation: evolving  Activity Tolerance  Activity Tolerance: Patient limited by fatigue;Patient limited by endurance    Plan  Physical Therapy Plan  General Plan: 3-5 times per week  Current Treatment Recommendations: Strengthening, Balance training, Functional  Outcome: Verbalized understanding;Continued education needed      Therapy Time   Individual Concurrent Group Co-treatment   Time In 0947         Time Out 1012         Minutes 25         Timed Code Treatment Minutes: 25 Minutes       Moses Pulliam, PT

## 2025-03-07 ENCOUNTER — CARE COORDINATION (OUTPATIENT)
Dept: CASE MANAGEMENT | Age: 80
End: 2025-03-07

## 2025-03-07 DIAGNOSIS — I50.33 ACUTE ON CHRONIC HEART FAILURE WITH PRESERVED EJECTION FRACTION (HCC): Primary | ICD-10-CM

## 2025-03-07 LAB
ALBUMIN SERPL ELPH-MCNC: 2.9 G/DL (ref 3.1–4.9)
ALPHA1 GLOB SERPL ELPH-MCNC: 0.3 G/DL (ref 0.2–0.4)
ALPHA2 GLOB SERPL ELPH-MCNC: 0.5 G/DL (ref 0.4–1.1)
B-GLOBULIN SERPL ELPH-MCNC: 0.9 G/DL (ref 0.9–1.6)
GAMMA GLOB SERPL ELPH-MCNC: 1.1 G/DL (ref 0.6–1.8)
PROT SERPL-MCNC: 5.7 G/DL (ref 6.4–8.2)
SPE/IFE INTERPRETATION: NORMAL

## 2025-03-07 PROCEDURE — 1111F DSCHRG MED/CURRENT MED MERGE: CPT | Performed by: INTERNAL MEDICINE

## 2025-03-07 NOTE — CARE COORDINATION
Care Transitions Note    Initial Call - Call within 2 business days of discharge: Yes    Patient Current Location:  [place]    Care Transition Nurse contacted the spouse/partner  by telephone to perform post hospital discharge assessment, verified name and  as identifiers. Provided introduction to self, and explanation of the Care Transition Nurse role.     Patient: Maxx Norman    Patient : 1945   MRN: [id number]    Reason for Admission: leg swelling CHF   Discharge Date: 3/6/25  RURS: Readmission Risk Score: 12.6      Last Discharge Facility       Date Complaint Diagnosis Description Type Department Provider    3/4/25 Leg Swelling Hypervolemia, unspecified hypervolemia type ... ED to Hosp-Admission (Discharged) (ADMITTED) Sergio Hubbard MD; Ping Medina...            Was this an external facility discharge? No    Additional needs identified to be addressed with provider   No needs identified             Method of communication with provider: none.    Patients top risk factors for readmission: medical condition-.    Interventions to address risk factors:   Education: .CHF education   Review of patient management of conditions/medications: .    Care Summary Note: CTN spoke with patient's spouse Alexus as she reported patient is very [place] and CTN verified HIPAA form on file. Alexus reported patient is doing well and denied any worsening swelling or sob. Patient's weight today was 191 lbs. CTN discussed CHF management and s/sx to monitor for and when report.  Alexus reported patient is taking all medications as prescribed and aware of medications to stop. [facility] will be out 3/8/25 for soc.Denies any acute needs at present time.  Agreeable to f/u calls.  Educated on the use of urgent care or physician’s 24 hr access line if assistance is needed after hours & that they can always contact their home care provider to request a nurse visit even if it isn't their regularly scheduled day for their nurse to

## 2025-03-08 LAB — REPORT: NORMAL

## 2025-03-10 ENCOUNTER — TELEPHONE (OUTPATIENT)
Dept: FAMILY MEDICINE CLINIC | Age: 80
End: 2025-03-10

## 2025-03-10 NOTE — TELEPHONE ENCOUNTER
Caroline with [facility] wants to let you know that she reached out to the pt to set up care. The pt declined home care.     Caroline   [phone]

## 2025-03-11 LAB
JAK2 P.V617F BLD/T QL: NOT DETECTED
JAK2 P.V617F MUT/NOR BLD/T: 0 %
SPECIMEN SOURCE: NORMAL

## 2025-03-12 ENCOUNTER — CARE COORDINATION (OUTPATIENT)
Dept: CASE MANAGEMENT | Age: 80
End: 2025-03-12

## 2025-03-12 NOTE — CARE COORDINATION
Care Transitions Note    Follow Up Call     Patient Current Location:  [place]    Care Transition Nurse contacted the spouse/partner  by telephone. Verified name and  as identifiers.    Additional needs identified to be addressed with provider   SVETA. Patient having leg cramps and hiccups which is disrupting sleep.   Thanks                 Method of communication with provider: chart routing.    Care Summary Note: CTN spoke with patient's spouse, Alexus, who reported patient had a bad night last night, patient was having hiccups and leg cramps which prevented patient from being able to rest. Alexus reported patient is eating and drinking without difficulty and plans to discuss symptoms with cardiologist at [facility] on 3/13. Alexus not sure what patient's weight was today as he went back to bed but she thinks it has been stable around 191 lbs. CTN advised patient of use of urgent care or physician’s 24 hr access line if assistance is needed after hours.        Plan of care updates since last contact:  Review of patient management of conditions/medications: .       Advance Care Planning:   Does patient have an Advance Directive: reviewed during previous call, see note. .    Medication Review:  No changes since last call.     Remote Patient Monitoring:  Offered patient enrollment in the Remote Patient Monitoring (RPM) program for in-home monitoring: discuss on follow up     Assessments:  Care Transitions Subsequent and Final Call    Subsequent and Final Calls  Do you have any ongoing symptoms?: Yes  Patient-reported symptoms: Other  Interventions for patient-reported symptoms: Notified PCP/Physician  Have your medications changed?: No  Do you have any questions related to your medications?: No  Do you currently have any active services?: No  Do you have any needs or concerns that I can assist you with?: No  Identified Barriers: None  Care Transitions Interventions  Other Interventions:              Follow Up Appointment:

## 2025-03-13 ENCOUNTER — HOSPITAL ENCOUNTER (INPATIENT)
Age: 80
LOS: 8 days | Discharge: HOME HEALTH | End: 2025-03-21
Attending: EMERGENCY MEDICINE | Admitting: HOSPITALIST
Payer: MEDICARE

## 2025-03-13 ENCOUNTER — APPOINTMENT (OUTPATIENT)
Dept: GENERAL RADIOLOGY | Age: 80
End: 2025-03-13
Payer: MEDICARE

## 2025-03-13 ENCOUNTER — APPOINTMENT (OUTPATIENT)
Dept: CT IMAGING | Age: 80
End: 2025-03-13
Payer: MEDICARE

## 2025-03-13 DIAGNOSIS — E87.1 HYPONATREMIA: Primary | ICD-10-CM

## 2025-03-13 DIAGNOSIS — R06.6 HICCUPS: ICD-10-CM

## 2025-03-13 DIAGNOSIS — R53.1 GENERALIZED WEAKNESS: ICD-10-CM

## 2025-03-13 DIAGNOSIS — R45.1 AGITATION: ICD-10-CM

## 2025-03-13 DIAGNOSIS — T50.905A ADVERSE EFFECT OF DRUG, INITIAL ENCOUNTER: ICD-10-CM

## 2025-03-13 DIAGNOSIS — E86.0 DEHYDRATION: ICD-10-CM

## 2025-03-13 LAB
ACANTHOCYTES BLD QL SMEAR: ABNORMAL
ALBUMIN SERPL-MCNC: 4.1 G/DL (ref 3.4–5)
ALBUMIN/GLOB SERPL: 1.4 {RATIO} (ref 1.1–2.2)
ALP SERPL-CCNC: 138 U/L (ref 40–129)
ALT SERPL-CCNC: 41 U/L (ref 10–40)
ANION GAP SERPL CALCULATED.3IONS-SCNC: 11 MMOL/L (ref 3–16)
ANION GAP SERPL CALCULATED.3IONS-SCNC: 12 MMOL/L (ref 3–16)
ANION GAP SERPL CALCULATED.3IONS-SCNC: 13 MMOL/L (ref 3–16)
ANISOCYTOSIS BLD QL SMEAR: ABNORMAL
AST SERPL-CCNC: 79 U/L (ref 15–37)
BASOPHILS # BLD: 0.2 K/UL (ref 0–0.2)
BASOPHILS NFR BLD: 1 %
BILIRUB SERPL-MCNC: 3.6 MG/DL (ref 0–1)
BILIRUB UR QL STRIP.AUTO: NEGATIVE
BUN SERPL-MCNC: 14 MG/DL (ref 7–20)
BUN SERPL-MCNC: 15 MG/DL (ref 7–20)
BUN SERPL-MCNC: 15 MG/DL (ref 7–20)
BURR CELLS BLD QL SMEAR: ABNORMAL
CALCIUM SERPL-MCNC: 9.3 MG/DL (ref 8.3–10.6)
CALCIUM SERPL-MCNC: 9.3 MG/DL (ref 8.3–10.6)
CALCIUM SERPL-MCNC: 9.9 MG/DL (ref 8.3–10.6)
CHLORIDE SERPL-SCNC: 72 MMOL/L (ref 99–110)
CHLORIDE SERPL-SCNC: 73 MMOL/L (ref 99–110)
CHLORIDE SERPL-SCNC: 75 MMOL/L (ref 99–110)
CLARITY UR: CLEAR
CO2 SERPL-SCNC: 21 MMOL/L (ref 21–32)
CO2 SERPL-SCNC: 21 MMOL/L (ref 21–32)
CO2 SERPL-SCNC: 23 MMOL/L (ref 21–32)
COLOR UR: YELLOW
CREAT SERPL-MCNC: 1.2 MG/DL (ref 0.8–1.3)
DEPRECATED RDW RBC AUTO: 16 % (ref 12.4–15.4)
EKG ATRIAL RATE: 416 BPM
EKG DIAGNOSIS: NORMAL
EKG Q-T INTERVAL: 406 MS
EKG QRS DURATION: 118 MS
EKG QTC CALCULATION (BAZETT): 450 MS
EKG R AXIS: 50 DEGREES
EKG T AXIS: 21 DEGREES
EKG VENTRICULAR RATE: 74 BPM
EOSINOPHIL # BLD: 0 K/UL (ref 0–0.6)
EOSINOPHIL NFR BLD: 0 %
FLUAV + FLUBV AG NOSE IA.RAPID: NOT DETECTED
FLUAV + FLUBV AG NOSE IA.RAPID: NOT DETECTED
GFR SERPLBLD CREATININE-BSD FMLA CKD-EPI: 61 ML/MIN/{1.73_M2}
GLUCOSE SERPL-MCNC: 108 MG/DL (ref 70–99)
GLUCOSE SERPL-MCNC: 122 MG/DL (ref 70–99)
GLUCOSE SERPL-MCNC: 141 MG/DL (ref 70–99)
GLUCOSE UR STRIP.AUTO-MCNC: NEGATIVE MG/DL
HCT VFR BLD AUTO: 36.6 % (ref 40.5–52.5)
HGB BLD-MCNC: 13 G/DL (ref 13.5–17.5)
HGB UR QL STRIP.AUTO: NEGATIVE
INR PPP: 3.85 (ref 0.85–1.15)
KETONES UR STRIP.AUTO-MCNC: NEGATIVE MG/DL
LEUKOCYTE ESTERASE UR QL STRIP.AUTO: NEGATIVE
LYMPHOCYTES # BLD: 1.7 K/UL (ref 1–5.1)
LYMPHOCYTES NFR BLD: 7 %
MCH RBC QN AUTO: 34.3 PG (ref 26–34)
MCHC RBC AUTO-ENTMCNC: 35.4 G/DL (ref 31–36)
MCV RBC AUTO: 96.8 FL (ref 80–100)
MONOCYTES # BLD: 1.2 K/UL (ref 0–1.3)
MONOCYTES NFR BLD: 5 %
MYELOCYTES NFR BLD MANUAL: 2 %
NEUTROPHILS # BLD: 20.7 K/UL (ref 1.7–7.7)
NEUTROPHILS NFR BLD: 78 %
NEUTS BAND NFR BLD MANUAL: 6 % (ref 0–7)
NITRITE UR QL STRIP.AUTO: NEGATIVE
OSMOLALITY UR: 220 MOSM/KG (ref 390–1070)
OVALOCYTES BLD QL SMEAR: ABNORMAL
PATH INTERP BLD-IMP: YES
PH UR STRIP.AUTO: 7.5 [PH] (ref 5–8)
PLATELET # BLD AUTO: 1075 K/UL (ref 135–450)
PLATELET BLD QL SMEAR: ABNORMAL
PMV BLD AUTO: 8.4 FL (ref 5–10.5)
POIKILOCYTOSIS BLD QL SMEAR: ABNORMAL
POLYCHROMASIA BLD QL SMEAR: ABNORMAL
POTASSIUM SERPL-SCNC: 3.9 MMOL/L (ref 3.5–5.1)
POTASSIUM SERPL-SCNC: 4.1 MMOL/L (ref 3.5–5.1)
POTASSIUM SERPL-SCNC: 4.4 MMOL/L (ref 3.5–5.1)
PROMYELOCYTES NFR BLD MANUAL: 1 %
PROT SERPL-MCNC: 7.1 G/DL (ref 6.4–8.2)
PROT UR STRIP.AUTO-MCNC: NEGATIVE MG/DL
PROTHROMBIN TIME: 37.5 SEC (ref 11.9–14.9)
RBC # BLD AUTO: 3.78 M/UL (ref 4.2–5.9)
SARS-COV-2 RDRP RESP QL NAA+PROBE: NOT DETECTED
SLIDE REVIEW: ABNORMAL
SODIUM SERPL-SCNC: 106 MMOL/L (ref 136–145)
SODIUM SERPL-SCNC: 106 MMOL/L (ref 136–145)
SODIUM SERPL-SCNC: 109 MMOL/L (ref 136–145)
SODIUM UR-SCNC: 73 MMOL/L
SP GR UR STRIP.AUTO: 1 (ref 1–1.03)
TOXIC GRANULES BLD QL SMEAR: PRESENT
TROPONIN, HIGH SENSITIVITY: 33 NG/L (ref 0–22)
UA COMPLETE W REFLEX CULTURE PNL UR: NORMAL
UA DIPSTICK W REFLEX MICRO PNL UR: NORMAL
URN SPEC COLLECT METH UR: NORMAL
UROBILINOGEN UR STRIP-ACNC: 0.2 E.U./DL
WBC # BLD AUTO: 23.8 K/UL (ref 4–11)

## 2025-03-13 PROCEDURE — 2500000003 HC RX 250 WO HCPCS: Performed by: HOSPITALIST

## 2025-03-13 PROCEDURE — 6370000000 HC RX 637 (ALT 250 FOR IP): Performed by: HOSPITALIST

## 2025-03-13 PROCEDURE — 85025 COMPLETE CBC W/AUTO DIFF WBC: CPT

## 2025-03-13 PROCEDURE — 87635 SARS-COV-2 COVID-19 AMP PRB: CPT

## 2025-03-13 PROCEDURE — 6360000002 HC RX W HCPCS: Performed by: EMERGENCY MEDICINE

## 2025-03-13 PROCEDURE — 2000000000 HC ICU R&B

## 2025-03-13 PROCEDURE — 36415 COLL VENOUS BLD VENIPUNCTURE: CPT

## 2025-03-13 PROCEDURE — 81003 URINALYSIS AUTO W/O SCOPE: CPT

## 2025-03-13 PROCEDURE — 84484 ASSAY OF TROPONIN QUANT: CPT

## 2025-03-13 PROCEDURE — 70450 CT HEAD/BRAIN W/O DYE: CPT

## 2025-03-13 PROCEDURE — 96375 TX/PRO/DX INJ NEW DRUG ADDON: CPT

## 2025-03-13 PROCEDURE — 93010 ELECTROCARDIOGRAM REPORT: CPT | Performed by: INTERNAL MEDICINE

## 2025-03-13 PROCEDURE — 87502 INFLUENZA DNA AMP PROBE: CPT

## 2025-03-13 PROCEDURE — 93005 ELECTROCARDIOGRAM TRACING: CPT | Performed by: EMERGENCY MEDICINE

## 2025-03-13 PROCEDURE — 85610 PROTHROMBIN TIME: CPT

## 2025-03-13 PROCEDURE — 96365 THER/PROPH/DIAG IV INF INIT: CPT

## 2025-03-13 PROCEDURE — 84300 ASSAY OF URINE SODIUM: CPT

## 2025-03-13 PROCEDURE — 99285 EMERGENCY DEPT VISIT HI MDM: CPT

## 2025-03-13 PROCEDURE — 80053 COMPREHEN METABOLIC PANEL: CPT

## 2025-03-13 PROCEDURE — 83935 ASSAY OF URINE OSMOLALITY: CPT

## 2025-03-13 PROCEDURE — 71045 X-RAY EXAM CHEST 1 VIEW: CPT

## 2025-03-13 PROCEDURE — 2580000003 HC RX 258: Performed by: INTERNAL MEDICINE

## 2025-03-13 PROCEDURE — 2580000003 HC RX 258: Performed by: EMERGENCY MEDICINE

## 2025-03-13 RX ORDER — ACETAMINOPHEN 325 MG/1
650 TABLET ORAL EVERY 6 HOURS PRN
Status: DISCONTINUED | OUTPATIENT
Start: 2025-03-13 | End: 2025-03-21 | Stop reason: HOSPADM

## 2025-03-13 RX ORDER — SODIUM CHLORIDE 0.9 % (FLUSH) 0.9 %
5-40 SYRINGE (ML) INJECTION PRN
Status: DISCONTINUED | OUTPATIENT
Start: 2025-03-13 | End: 2025-03-21 | Stop reason: HOSPADM

## 2025-03-13 RX ORDER — DEXTROSE MONOHYDRATE AND SODIUM CHLORIDE 5; .45 G/100ML; G/100ML
INJECTION, SOLUTION INTRAVENOUS CONTINUOUS
Status: DISCONTINUED | OUTPATIENT
Start: 2025-03-13 | End: 2025-03-13

## 2025-03-13 RX ORDER — SODIUM CHLORIDE 9 MG/ML
INJECTION, SOLUTION INTRAVENOUS CONTINUOUS
Status: DISCONTINUED | OUTPATIENT
Start: 2025-03-13 | End: 2025-03-13

## 2025-03-13 RX ORDER — ONDANSETRON 2 MG/ML
4 INJECTION INTRAMUSCULAR; INTRAVENOUS EVERY 6 HOURS PRN
Status: DISCONTINUED | OUTPATIENT
Start: 2025-03-13 | End: 2025-03-21 | Stop reason: HOSPADM

## 2025-03-13 RX ORDER — ONDANSETRON 4 MG/1
4 TABLET, ORALLY DISINTEGRATING ORAL EVERY 8 HOURS PRN
Status: DISCONTINUED | OUTPATIENT
Start: 2025-03-13 | End: 2025-03-21 | Stop reason: HOSPADM

## 2025-03-13 RX ORDER — 3% SODIUM CHLORIDE 3 G/100ML
35 INJECTION, SOLUTION INTRAVENOUS CONTINUOUS
Status: DISPENSED | OUTPATIENT
Start: 2025-03-13 | End: 2025-03-14

## 2025-03-13 RX ORDER — 0.9 % SODIUM CHLORIDE 0.9 %
500 INTRAVENOUS SOLUTION INTRAVENOUS ONCE
Status: COMPLETED | OUTPATIENT
Start: 2025-03-13 | End: 2025-03-13

## 2025-03-13 RX ORDER — POTASSIUM CHLORIDE 7.45 MG/ML
10 INJECTION INTRAVENOUS PRN
Status: DISCONTINUED | OUTPATIENT
Start: 2025-03-13 | End: 2025-03-21 | Stop reason: HOSPADM

## 2025-03-13 RX ORDER — TAMSULOSIN HYDROCHLORIDE 0.4 MG/1
0.4 CAPSULE ORAL DAILY
Status: DISCONTINUED | OUTPATIENT
Start: 2025-03-13 | End: 2025-03-21 | Stop reason: HOSPADM

## 2025-03-13 RX ORDER — POLYETHYLENE GLYCOL 3350 17 G/17G
17 POWDER, FOR SOLUTION ORAL DAILY PRN
Status: DISCONTINUED | OUTPATIENT
Start: 2025-03-13 | End: 2025-03-21 | Stop reason: HOSPADM

## 2025-03-13 RX ORDER — SODIUM CHLORIDE 0.9 % (FLUSH) 0.9 %
5-40 SYRINGE (ML) INJECTION EVERY 12 HOURS SCHEDULED
Status: DISCONTINUED | OUTPATIENT
Start: 2025-03-13 | End: 2025-03-21 | Stop reason: HOSPADM

## 2025-03-13 RX ORDER — DIPHENHYDRAMINE HYDROCHLORIDE 50 MG/ML
25 INJECTION, SOLUTION INTRAMUSCULAR; INTRAVENOUS ONCE
Status: COMPLETED | OUTPATIENT
Start: 2025-03-13 | End: 2025-03-13

## 2025-03-13 RX ORDER — ATORVASTATIN CALCIUM 80 MG/1
80 TABLET, FILM COATED ORAL NIGHTLY
Status: DISCONTINUED | OUTPATIENT
Start: 2025-03-13 | End: 2025-03-21 | Stop reason: HOSPADM

## 2025-03-13 RX ORDER — MAGNESIUM SULFATE IN WATER 40 MG/ML
2000 INJECTION, SOLUTION INTRAVENOUS PRN
Status: DISCONTINUED | OUTPATIENT
Start: 2025-03-13 | End: 2025-03-21 | Stop reason: HOSPADM

## 2025-03-13 RX ORDER — ACETAMINOPHEN 650 MG/1
650 SUPPOSITORY RECTAL EVERY 6 HOURS PRN
Status: DISCONTINUED | OUTPATIENT
Start: 2025-03-13 | End: 2025-03-21 | Stop reason: HOSPADM

## 2025-03-13 RX ORDER — HYDROXYUREA 500 MG/1
500 CAPSULE ORAL DAILY
Status: DISCONTINUED | OUTPATIENT
Start: 2025-03-14 | End: 2025-03-14

## 2025-03-13 RX ORDER — SODIUM CHLORIDE 450 MG/100ML
INJECTION, SOLUTION INTRAVENOUS CONTINUOUS
Status: DISCONTINUED | OUTPATIENT
Start: 2025-03-13 | End: 2025-03-13

## 2025-03-13 RX ORDER — BENZTROPINE MESYLATE 1 MG/ML
1 INJECTION, SOLUTION INTRAMUSCULAR; INTRAVENOUS ONCE
Status: COMPLETED | OUTPATIENT
Start: 2025-03-13 | End: 2025-03-13

## 2025-03-13 RX ORDER — POTASSIUM CHLORIDE 1500 MG/1
40 TABLET, EXTENDED RELEASE ORAL PRN
Status: DISCONTINUED | OUTPATIENT
Start: 2025-03-13 | End: 2025-03-21 | Stop reason: HOSPADM

## 2025-03-13 RX ORDER — SODIUM CHLORIDE 9 MG/ML
INJECTION, SOLUTION INTRAVENOUS PRN
Status: DISCONTINUED | OUTPATIENT
Start: 2025-03-13 | End: 2025-03-21 | Stop reason: HOSPADM

## 2025-03-13 RX ORDER — HALOPERIDOL 5 MG/ML
1 INJECTION INTRAMUSCULAR EVERY 4 HOURS PRN
Status: DISCONTINUED | OUTPATIENT
Start: 2025-03-13 | End: 2025-03-21 | Stop reason: HOSPADM

## 2025-03-13 RX ADMIN — DEXTROSE AND SODIUM CHLORIDE: 5; 450 INJECTION, SOLUTION INTRAVENOUS at 14:01

## 2025-03-13 RX ADMIN — SODIUM CHLORIDE 35 ML/HR: 3 INJECTION, SOLUTION INTRAVENOUS at 15:52

## 2025-03-13 RX ADMIN — DIPHENHYDRAMINE HYDROCHLORIDE 25 MG: 50 INJECTION INTRAMUSCULAR; INTRAVENOUS at 14:29

## 2025-03-13 RX ADMIN — BENZTROPINE MESYLATE 1 MG: 1 INJECTION INTRAMUSCULAR; INTRAVENOUS at 16:04

## 2025-03-13 RX ADMIN — ATORVASTATIN CALCIUM 80 MG: 80 TABLET, FILM COATED ORAL at 20:49

## 2025-03-13 RX ADMIN — DIPHENHYDRAMINE HYDROCHLORIDE 25 MG: 50 INJECTION INTRAMUSCULAR; INTRAVENOUS at 14:57

## 2025-03-13 RX ADMIN — CHLORPROMAZINE HYDROCHLORIDE 25 MG: 25 INJECTION INTRAMUSCULAR at 13:24

## 2025-03-13 RX ADMIN — SODIUM CHLORIDE 500 ML: 9 INJECTION, SOLUTION INTRAVENOUS at 13:15

## 2025-03-13 RX ADMIN — TAMSULOSIN HYDROCHLORIDE 0.4 MG: 0.4 CAPSULE ORAL at 20:49

## 2025-03-13 RX ADMIN — SODIUM CHLORIDE, PRESERVATIVE FREE 10 ML: 5 INJECTION INTRAVENOUS at 22:22

## 2025-03-13 ASSESSMENT — PAIN SCALES - GENERAL: PAINLEVEL_OUTOF10: 0

## 2025-03-13 NOTE — H&P
Hospital Medicine History & Physical      PCP: Michelle Humphries MD    Date of Admission: 3/13/2025    Date of Service: Pt seen/examined on 3/13/25 and Admitted to Inpatient with expected LOS greater than two midnights due to medical therapy.     Chief Complaint: Generalized weakness      History Of Present Illness:     79 y.o. male with history of CAD status post PCI stent, hypertension, hyperlipidemia, paroxysmal A-fib, bradycardia s/p PPM he He was recently hospitalized at [facility] with bilateral leg edema requiring aggressive diuresis for treatment, he also had a hyponatremia, leukocytosis and thrombocytosis to suspected myeloproliferative disorder onset on hydroxyurea  Patient returns the ED because of generalized weakness, he has been unable to get up and get dressed today, is also been having increased confusion, slurring of his speech, he has been having poor appetite.  No fevers, nausea, vomiting or diarrhea  In the ED, BP was 115/86, pulse 83, respirations 17, temperature 98.2  CT head, chest x-ray was unremarkable  He was noted to have severe hyponatremia with a sodium of 106  Labs WBC 23, hemoglobin 10, platelets 1000  UA was unremarkable    Past Medical History:          Diagnosis Date    Atrial fibrillation (HCC)     Basal cell carcinoma     right ear and right cheek    Bilateral tinnitus 2005    Bradycardia     required pacemaker     CAD (coronary artery disease)     4 stents placed     GERD (gastroesophageal reflux disease)     Hyperlipidemia     Hypertension     Osteoarthritis        Past Surgical History:          Procedure Laterality Date    A-V CARDIAC PACEMAKER INSERTION      bradycardia     CORONARY ANGIOPLASTY WITH STENT PLACEMENT  2008    EP DEVICE PROCEDURE N/A 7/22/2024    Insert PPM dual performed by Shankar Carballo MD at [facility] CARDIAC CATH LAB    EXTERNAL EAR SURGERY Right     removed basal cell cancer    SKIN CANCER DESTRUCTION  03/2017    Right cheek       Medications Prior to

## 2025-03-13 NOTE — ED NOTES
ED SBAR report provider to SteffW, RN. Patient to be transported to Room 2124 via stretcher by RN.Patient transported with bedside cardiac monitor. IV site clean, dry, and intact. Updated family on plan of care.

## 2025-03-13 NOTE — ED TRIAGE NOTES
Pt has been having weakness, leg cramps, and decreased appetite for the last few weeks. Pt was discharged a few weeks ago for CHF hypovolemia. Pt's son states that he has also had some decreased and slurred speech for the last day. Pt was unable to get up and dressed today. Pt is able to walk on his own at baseline. Pt's son states that he has had on and off hiccups for the past three days.

## 2025-03-13 NOTE — CONSULTS
Nephrology Consult Note   [facility]      Chief Complaint: Altered mental status    History of Present Illness: Patient is lethargic - history obtained from patients son and chart    Mr Norman is an 79 male with a past medical history significant for Hypertension, Hyperlipidemia, CAD (stent LAD in ), A Fib, bradycardia s/p PPM and recent hospitalization at [facility] from 3/4/25 to 3/6/25 with volume overload that presented to the ED with altered mental status    Son stated he went to  his father to take to his Cardiology appointment and realized his father was not dressed appeared confused and could not follow instructions and that's when he decided to get to the ED. Upon arrival blood work revealed a serum sodium of 106 meq/L. He was also noted to be retaining urine so a james was inserted    Patient is making quite a bit of urine. He remains confused thrashing in bed. Nephrology consulted for critical life threatening Hyponatremia    Subjective:    In bed; confused    ROS: unobtainable     Scheduled Meds:     3% sodium chloride         PRN Meds:.    Physical Exam:    TEMPERATURE:  Current - Temp: 98.2 °F (36.8 °C); Max - Temp  Av.2 °F (36.8 °C)  Min: 98.2 °F (36.8 °C)  Max: 98.2 °F (36.8 °C)  RESPIRATIONS RANGE: Resp  Av  Min: 14  Max: 24  PULSE RANGE: Pulse  Av.8  Min: 61  Max: 89  BLOOD PRESSURE RANGE:  Systolic (24hrs), Av , Min:111 , Max:137   ; Diastolic (24hrs), Av, Min:67, Max:98    24HR INTAKE/OUTPUT:  No intake or output data in the 24 hours ending 25 1541    No intake or output data in the 24 hours ending 25 1541    Patient Vitals for the past 96 hrs (Last 3 readings):   Weight   25 1228 84.7 kg (186 lb 11.7 oz)       General: confused   HEENT: Normocephalic, atraumatic  Chest: CTA  CVS: RRR, no rub  Abdomen: soft, non distended  Extremities: Trace edema  Skin: normal skin turgor, no rash  Musculoskeletal: no joint swelling, no visible

## 2025-03-13 NOTE — PROGRESS NOTES
NAME:  Maxx Norman  YOB: 1945  MEDICAL RECORD NUMBER:  [id number]    Shift Summary: Admitted to ICU, came from home with slurred speech, weakness, leg cramps decreased appetite for the last few weeks. Pt was discharged a week ago for CHF hypovolemia. Pt's son states that he has also had some decreased and slurred speech for the last day. Pt was unable to get up and dressed today. Pt is able to walk on his own at baseline.     Family updated: Yes:  son at bedside    Rhythm: Paced     Most recent vitals:   Visit Vitals  BP (!) 148/71   Pulse 80   Temp 97.5 °F (36.4 °C) (Axillary)   Resp 15   Ht 1.854 m (6' 1\")   Wt 85.2 kg (187 lb 13.3 oz)   SpO2 99%   BMI 24.78 kg/m²           No data found.    No data found.      Respiratory support needed (if any):  - RA    Admission weight Weight - Scale: 84.7 kg (186 lb 11.7 oz) (03/13/25 1228)    Today's weight    Wt Readings from Last 1 Encounters:   03/13/25 85.2 kg (187 lb 13.3 oz)        James need assessed each shift: YES -  - continue james r/t - retention  UOP >30ml/hr: YES  Last documented BM (in last 48 hrs):  No data found.             Restraints (in use currently or dc'd in last 12 hrs): Yes    Order current and documentation up to date? Yes    Lines/Drains reviewed @ bedside.  Peripheral IV 03/13/25 Posterior;Right Forearm (Active)   Number of days: 0       Urinary Catheter 03/13/25 James (Active)   Number of days: 0         Drip rates at handoff:    sodium chloride      3% sodium chloride 35 mL/hr (03/13/25 1659)       Lab Data:   CBC:   Recent Labs     03/13/25  1258   WBC 23.8*   HGB 13.0*   HCT 36.6*   MCV 96.8   PLT 1,075*     BMP:    Recent Labs     03/13/25  1258 03/13/25  1545   * 106*   K 4.4 4.1   CO2 21 21   BUN 14 15   CREATININE 1.2 1.2     ABG: No results for input(s): \"PHART\", \"XUI4AZD\", \"PO2ART\" in the last 72 hours.    Any consults during the shift? No    Any signed and held orders to be released?  No        4 Eyes Skin

## 2025-03-13 NOTE — PROGRESS NOTES
Pt admitted to ICU from  ER Bedside report completed with RN. Skin assessment completed. When turning patient to assess skin, patient had redness and what looked like abrasion. Pictures taken. Pt is alert and confused pulling at IV and catheter, Restraints applied. Son notified at bedside. Fall precautions in place. Bed alarm activated.

## 2025-03-13 NOTE — PROGRESS NOTES
Medication Reconciliation    List of medications patient is currently taking is complete.     Source of information: 1. Conversation with patient's son at bedside                                      2. EPIC records     Notes regarding home medications:   1. Per patient's son pt stated that he took all his daily medications this AM pta, however pt is very altered and pt's son does not think he would know if he took them or not based on his current mental status  2. Pt was recently admitted 3/4-3/6/25 and full med rec done at that time. Per pt's son no additional changes were made since discharge    Fabiana Haywood [facility], PharmD 3/13/2025 4:20 PM

## 2025-03-13 NOTE — ED PROVIDER NOTES
Ménière's disease, benign positional vertigo, stroke or TIA in the posterior circulation, bleed of the cerebellopontine angle, cardiac arrhythmia, anemia, dehydration, sepsis, Metabolic emergency, Multiple Sclerosis, brain or ENT tumor, other    EKG-ordered to rule out myocardial infarction, rhythm disturbances, conduction disturbances, LVH, MAURICE, pericarditis, voltage abnormalities, or other pathology that might be causing the patient's symptoms.    CT head-CT scan of the head was ordered to rule out stroke, intracranial hemorrhage, thrombotic stroke, embolic stroke, mass lesions, cerebral contusion, intracranial infection or abscesses.    Chest x-ray-chest x-ray was ordered to rule out pneumonia, pneumothorax, congestive heart failure, cardiomegaly, chest masses, aortic aneurysm, hiatal hernia, rib fractures, or any other pathology that might be causing the patient's symptoms    CBC-CBC was ordered to rule out anemia, infection, abnormal platelet count, polycythemia, abnormal Red cell pathology, or any other pathology that might be causing the patient's symptoms    CMP-CMP was ordered to rule out electrolyte abnormalities, kidney dysfunction, electrolyte imbalance, abnormal transaminases, liver dysfunction, or any other pathology that might be causing the patient's symptoms.    Urine-urinalysis was ordered to rule out infection, renal failure, dehydration, pregnancy, proteinuria, bilirubinuria, or any other pathology that might be causing the patient's symptoms    The patient's blood pressure was found to be elevated according to CMS/Medicare and the Affordable Care Act/ObamaCare criteria. Elevated blood pressure could occur because of pain or anxiety or other reasons and does not mean that they need to have their blood pressure treated or medications otherwise adjusted. However, this could also be a sign that they will need to have their blood pressure treated or medications changed.    The patient was instructed

## 2025-03-13 NOTE — PROGRESS NOTES
4 Eyes Skin Assessment     NAME:  aMxx Norman  YOB: 1945  MEDICAL RECORD NUMBER:  [id number]    The patient is being assessed for  Admission    I agree that at least one RN has performed a thorough Head to Toe Skin Assessment on the patient. ALL assessment sites listed below have been assessed.      Areas assessed by both nurses:    Head, Face, Ears, Shoulders, Back, Chest, Arms, Elbows, Hands, Sacrum. Buttock, Coccyx, Ischium, Legs. Feet and Heels, and Under Medical Devices         Does the Patient have a Wound? No noted wound(s) - friction on bilateral buttocks       Abdirashid Prevention initiated by RN: Yes  Wound Care Orders initiated by RN: No    Pressure Injury (Stage 3,4, Unstageable, DTI, NWPT, and Complex wounds) if present, place Wound referral order by RN under : No    New Ostomies, if present place, Ostomy referral order under : No     Nurse 1 eSignature: Electronically signed by Malena Holden RN on 3/13/25 at 5:30 PM EDT    **SHARE this note so that the co-signing nurse can place an eSignature**    Nurse 2 eSignature: Electronically signed by Kayleigh Madrid RN on 3/13/25 at 7:06 PM EDT

## 2025-03-13 NOTE — ED NOTES
Pt experiencing of itching and agitation after receiving iv Thorazine.  Dr. Hines aware and jacquiel ordered

## 2025-03-14 ENCOUNTER — APPOINTMENT (OUTPATIENT)
Dept: CT IMAGING | Age: 80
End: 2025-03-14
Payer: MEDICARE

## 2025-03-14 ENCOUNTER — CARE COORDINATION (OUTPATIENT)
Dept: CASE MANAGEMENT | Age: 80
End: 2025-03-14

## 2025-03-14 LAB
ALBUMIN SERPL-MCNC: 3.5 G/DL (ref 3.4–5)
ALP SERPL-CCNC: 119 U/L (ref 40–129)
ALT SERPL-CCNC: 34 U/L (ref 10–40)
AMMONIA PLAS-SCNC: 34 UMOL/L (ref 16–60)
ANA SER QL IA: NEGATIVE
ANION GAP SERPL CALCULATED.3IONS-SCNC: 10 MMOL/L (ref 3–16)
ANION GAP SERPL CALCULATED.3IONS-SCNC: 10 MMOL/L (ref 3–16)
ANION GAP SERPL CALCULATED.3IONS-SCNC: 11 MMOL/L (ref 3–16)
ANION GAP SERPL CALCULATED.3IONS-SCNC: 12 MMOL/L (ref 3–16)
AST SERPL-CCNC: 80 U/L (ref 15–37)
BILIRUB DIRECT SERPL-MCNC: 1.4 MG/DL (ref 0–0.3)
BILIRUB INDIRECT SERPL-MCNC: 1.6 MG/DL (ref 0–1)
BILIRUB SERPL-MCNC: 3 MG/DL (ref 0–1)
BUN SERPL-MCNC: 14 MG/DL (ref 7–20)
BUN SERPL-MCNC: 15 MG/DL (ref 7–20)
CALCIUM SERPL-MCNC: 9.3 MG/DL (ref 8.3–10.6)
CALCIUM SERPL-MCNC: 9.4 MG/DL (ref 8.3–10.6)
CALCIUM SERPL-MCNC: 9.5 MG/DL (ref 8.3–10.6)
CALCIUM SERPL-MCNC: 9.6 MG/DL (ref 8.3–10.6)
CHLORIDE SERPL-SCNC: 78 MMOL/L (ref 99–110)
CHLORIDE SERPL-SCNC: 78 MMOL/L (ref 99–110)
CHLORIDE SERPL-SCNC: 80 MMOL/L (ref 99–110)
CHLORIDE SERPL-SCNC: 81 MMOL/L (ref 99–110)
CO2 SERPL-SCNC: 23 MMOL/L (ref 21–32)
CO2 SERPL-SCNC: 24 MMOL/L (ref 21–32)
CO2 SERPL-SCNC: 24 MMOL/L (ref 21–32)
CO2 SERPL-SCNC: 26 MMOL/L (ref 21–32)
CREAT SERPL-MCNC: 1.2 MG/DL (ref 0.8–1.3)
CREAT SERPL-MCNC: 1.3 MG/DL (ref 0.8–1.3)
DEPRECATED RDW RBC AUTO: 16.4 % (ref 12.4–15.4)
FERRITIN SERPL IA-MCNC: 851 NG/ML (ref 30–400)
GFR SERPLBLD CREATININE-BSD FMLA CKD-EPI: 55 ML/MIN/{1.73_M2}
GFR SERPLBLD CREATININE-BSD FMLA CKD-EPI: 61 ML/MIN/{1.73_M2}
GLUCOSE SERPL-MCNC: 107 MG/DL (ref 70–99)
GLUCOSE SERPL-MCNC: 122 MG/DL (ref 70–99)
GLUCOSE SERPL-MCNC: 124 MG/DL (ref 70–99)
GLUCOSE SERPL-MCNC: 96 MG/DL (ref 70–99)
HBV SURFACE AB SERPL IA-ACNC: <3.5 MIU/ML
HCT VFR BLD AUTO: 35.8 % (ref 40.5–52.5)
HGB BLD-MCNC: 12.7 G/DL (ref 13.5–17.5)
IRON SATN MFR SERPL: 22 % (ref 20–50)
IRON SERPL-MCNC: 56 UG/DL (ref 59–158)
MAGNESIUM SERPL-MCNC: 1.73 MG/DL (ref 1.8–2.4)
MCH RBC QN AUTO: 34.5 PG (ref 26–34)
MCHC RBC AUTO-ENTMCNC: 35.5 G/DL (ref 31–36)
MCV RBC AUTO: 97.1 FL (ref 80–100)
MISCELLANEOUS LAB TEST ORDER: NORMAL
PATH INTERP BLD-IMP: NORMAL
PLATELET # BLD AUTO: 884 K/UL (ref 135–450)
PMV BLD AUTO: 8.5 FL (ref 5–10.5)
POTASSIUM SERPL-SCNC: 3.8 MMOL/L (ref 3.5–5.1)
POTASSIUM SERPL-SCNC: 3.9 MMOL/L (ref 3.5–5.1)
POTASSIUM SERPL-SCNC: 3.9 MMOL/L (ref 3.5–5.1)
POTASSIUM SERPL-SCNC: 4 MMOL/L (ref 3.5–5.1)
PROT SERPL-MCNC: 6 G/DL (ref 6.4–8.2)
RBC # BLD AUTO: 3.69 M/UL (ref 4.2–5.9)
SODIUM SERPL-SCNC: 112 MMOL/L (ref 136–145)
SODIUM SERPL-SCNC: 114 MMOL/L (ref 136–145)
SODIUM SERPL-SCNC: 115 MMOL/L (ref 136–145)
SODIUM SERPL-SCNC: 115 MMOL/L (ref 136–145)
SODIUM SERPL-SCNC: 116 MMOL/L (ref 136–145)
SODIUM SERPL-SCNC: 118 MMOL/L (ref 136–145)
SODIUM SERPL-SCNC: 119 MMOL/L (ref 136–145)
TIBC SERPL-MCNC: 255 UG/DL (ref 260–445)
URATE SERPL-MCNC: 5.7 MG/DL (ref 3.5–7.2)
WBC # BLD AUTO: 26.2 K/UL (ref 4–11)

## 2025-03-14 PROCEDURE — 86708 HEPATITIS A ANTIBODY: CPT

## 2025-03-14 PROCEDURE — 86038 ANTINUCLEAR ANTIBODIES: CPT

## 2025-03-14 PROCEDURE — 6360000002 HC RX W HCPCS: Performed by: HOSPITALIST

## 2025-03-14 PROCEDURE — 86706 HEP B SURFACE ANTIBODY: CPT

## 2025-03-14 PROCEDURE — P9045 ALBUMIN (HUMAN), 5%, 250 ML: HCPCS | Performed by: INTERNAL MEDICINE

## 2025-03-14 PROCEDURE — 85027 COMPLETE CBC AUTOMATED: CPT

## 2025-03-14 PROCEDURE — 2500000003 HC RX 250 WO HCPCS: Performed by: HOSPITALIST

## 2025-03-14 PROCEDURE — 2000000000 HC ICU R&B

## 2025-03-14 PROCEDURE — 82103 ALPHA-1-ANTITRYPSIN TOTAL: CPT

## 2025-03-14 PROCEDURE — 83540 ASSAY OF IRON: CPT

## 2025-03-14 PROCEDURE — 2580000003 HC RX 258: Performed by: INTERNAL MEDICINE

## 2025-03-14 PROCEDURE — 36415 COLL VENOUS BLD VENIPUNCTURE: CPT

## 2025-03-14 PROCEDURE — 6370000000 HC RX 637 (ALT 250 FOR IP): Performed by: INTERNAL MEDICINE

## 2025-03-14 PROCEDURE — 82105 ALPHA-FETOPROTEIN SERUM: CPT

## 2025-03-14 PROCEDURE — 86015 ACTIN ANTIBODY EACH: CPT

## 2025-03-14 PROCEDURE — 80048 BASIC METABOLIC PNL TOTAL CA: CPT

## 2025-03-14 PROCEDURE — 6360000002 HC RX W HCPCS: Performed by: INTERNAL MEDICINE

## 2025-03-14 PROCEDURE — 80076 HEPATIC FUNCTION PANEL: CPT

## 2025-03-14 PROCEDURE — 82728 ASSAY OF FERRITIN: CPT

## 2025-03-14 PROCEDURE — 84550 ASSAY OF BLOOD/URIC ACID: CPT

## 2025-03-14 PROCEDURE — 82140 ASSAY OF AMMONIA: CPT

## 2025-03-14 PROCEDURE — 6370000000 HC RX 637 (ALT 250 FOR IP): Performed by: HOSPITALIST

## 2025-03-14 PROCEDURE — 94760 N-INVAS EAR/PLS OXIMETRY 1: CPT

## 2025-03-14 PROCEDURE — 83735 ASSAY OF MAGNESIUM: CPT

## 2025-03-14 PROCEDURE — 71250 CT THORAX DX C-: CPT

## 2025-03-14 PROCEDURE — 83550 IRON BINDING TEST: CPT

## 2025-03-14 PROCEDURE — 84295 ASSAY OF SERUM SODIUM: CPT

## 2025-03-14 PROCEDURE — 82390 ASSAY OF CERULOPLASMIN: CPT

## 2025-03-14 RX ORDER — ALBUMIN HUMAN 50 G/1000ML
25 SOLUTION INTRAVENOUS ONCE
Status: COMPLETED | OUTPATIENT
Start: 2025-03-14 | End: 2025-03-14

## 2025-03-14 RX ORDER — MIDODRINE HYDROCHLORIDE 5 MG/1
5 TABLET ORAL
Status: DISCONTINUED | OUTPATIENT
Start: 2025-03-14 | End: 2025-03-21

## 2025-03-14 RX ORDER — NOREPINEPHRINE BITARTRATE 0.06 MG/ML
1-100 INJECTION, SOLUTION INTRAVENOUS CONTINUOUS
Status: DISCONTINUED | OUTPATIENT
Start: 2025-03-14 | End: 2025-03-16

## 2025-03-14 RX ORDER — HYDROXYUREA 500 MG/1
1000 CAPSULE ORAL DAILY
Status: DISCONTINUED | OUTPATIENT
Start: 2025-03-14 | End: 2025-03-21 | Stop reason: HOSPADM

## 2025-03-14 RX ORDER — 3% SODIUM CHLORIDE 3 G/100ML
35 INJECTION, SOLUTION INTRAVENOUS CONTINUOUS
Status: DISCONTINUED | OUTPATIENT
Start: 2025-03-14 | End: 2025-03-16

## 2025-03-14 RX ORDER — MAGNESIUM SULFATE IN WATER 40 MG/ML
2000 INJECTION, SOLUTION INTRAVENOUS ONCE
Status: COMPLETED | OUTPATIENT
Start: 2025-03-14 | End: 2025-03-14

## 2025-03-14 RX ADMIN — ALBUMIN (HUMAN) 25 G: 12.5 INJECTION, SOLUTION INTRAVENOUS at 14:04

## 2025-03-14 RX ADMIN — MIDODRINE HYDROCHLORIDE 5 MG: 5 TABLET ORAL at 17:54

## 2025-03-14 RX ADMIN — ATORVASTATIN CALCIUM 80 MG: 80 TABLET, FILM COATED ORAL at 22:15

## 2025-03-14 RX ADMIN — RIVAROXABAN 20 MG: 20 TABLET, FILM COATED ORAL at 10:55

## 2025-03-14 RX ADMIN — SODIUM CHLORIDE, PRESERVATIVE FREE 10 ML: 5 INJECTION INTRAVENOUS at 22:15

## 2025-03-14 RX ADMIN — HYDROXYUREA 1000 MG: 500 CAPSULE ORAL at 10:55

## 2025-03-14 RX ADMIN — SODIUM CHLORIDE 35 ML/HR: 3 INJECTION, SOLUTION INTRAVENOUS at 15:08

## 2025-03-14 RX ADMIN — MIDODRINE HYDROCHLORIDE 5 MG: 5 TABLET ORAL at 13:19

## 2025-03-14 RX ADMIN — MAGNESIUM SULFATE HEPTAHYDRATE 2000 MG: 40 INJECTION, SOLUTION INTRAVENOUS at 06:23

## 2025-03-14 ASSESSMENT — PAIN SCALES - GENERAL
PAINLEVEL_OUTOF10: 0
PAINLEVEL_OUTOF10: 0

## 2025-03-14 NOTE — PROGRESS NOTES
NAME:  Maxx Norman  YOB: 1945  MEDICAL RECORD NUMBER:  [id number]    Shift Summary: Mag replaced today, Ultrasound of ABD showed Fatty infiltration of liver, Chest CT negative, Q2 Na+ checks, Per Dr. Orellana If NA+ is less than 118 please call her, If Na+ is 122 or above turn 3% gtt off. On 1500 ML fluid restriction.      Family updated: Yes:  Son at bedside    Rhythm: Paced     Most recent vitals:   Visit Vitals  BP (!) 112/57   Pulse 83   Temp 97.7 °F (36.5 °C) (Oral)   Resp 17   Ht 1.854 m (6' 1\")   Wt 81.9 kg (180 lb 8.9 oz)   SpO2 100%   BMI 23.82 kg/m²           No data found.    No data found.      Respiratory support needed (if any):  - RA    Admission weight Weight - Scale: 84.7 kg (186 lb 11.7 oz) (03/13/25 1228)    Today's weight    Wt Readings from Last 1 Encounters:   03/14/25 81.9 kg (180 lb 8.9 oz)        James need assessed each shift: YES -  - continue james r/t - Fluid retention  UOP >30ml/hr: YES or NO - yes  Last documented BM (in last 48 hrs):  No data found.             Restraints (in use currently or dc'd in last 12 hrs): No    Order current and documentation up to date? No    Lines/Drains reviewed @ bedside.  Peripheral IV 03/13/25 Posterior;Right Forearm (Active)   Number of days: 1       Urinary Catheter 03/13/25 James (Active)   Number of days: 1         Drip rates at handoff:    norepinephrine      sodium chloride         Lab Data:   CBC:   Recent Labs     03/13/25  1258 03/14/25  0417   WBC 23.8* 26.2*   HGB 13.0* 12.7*   HCT 36.6* 35.8*   MCV 96.8 97.1   PLT 1,075* 884*     BMP:    Recent Labs     03/14/25  0801 03/14/25  1113 03/14/25  1729   * 116* 118*   K 3.9 3.8  --    CO2 23 24  --    BUN 15 14  --    CREATININE 1.2 1.3  --      ABG: No results for input(s): \"PHART\", \"GEO4JQO\", \"PO2ART\" in the last 72 hours.    Any consults during the shift? Yes: Consults received: : Oncology    Any signed and held orders to be released?  No        4 Eyes Skin  Assessment       The patient is being assessed for  Shift Handoff    I agree that at least one RN has performed a thorough Head to Toe Skin Assessment on the patient. ALL assessment sites listed below have been assessed.      Areas assessed by both nurses: Head, Face, Ears, Shoulders, Back, Chest, Arms, Elbows, Hands, Sacrum. Buttock, Coccyx, Ischium, Legs. Feet and Heels, and Under Medical Devices         Does the Patient have a Wound? Yes wound(s) were present on assessment. LDA wound assessment was Initiated and completed by RN    Wound Care Orders initiated by RN: No       Abdirashid Prevention initiated by RN: Yes    Pressure Injury (Stage 3,4, Unstageable, DTI, NWPT, and Complex wounds) if present, place Wound referral order by RN under : No    New Ostomies, if present place, Ostomy referral order under : No     Nurse 1 eSignature: Electronically signed by Kayleigh Madrid RN on 3/14/25 at 7:11 PM EDT    **SHARE this note so that the co-signing nurse can place an eSignature**    Nurse 2 eSignature: Electronically signed by Ana Eli RN on 3/14/25 at 7:38 PM EDT

## 2025-03-14 NOTE — CARE COORDINATION
Per chart review patient readmitted to [facility]. Care Transitions will continue to monitor.    Shelly Cota MSN, RN, [facility]  Care Transition Nurse  [phone] mobile

## 2025-03-14 NOTE — PROGRESS NOTES
NAME:  Maxx Norman  YOB: 1945  MEDICAL RECORD NUMBER:  [id number]    Shift Summary: Pt on and off restless/agitated. Only oriented to self. Still restrained as pt continues to pull and attempted to get OOB when released from restraints while being bathed. Pleasantly confused. Safety ensured. Closely monitored.    Mag 1.7- hanged 2 gms Mag sulfate at 0623    Family updated: No    Rhythm: Paced     Most recent vitals:   Visit Vitals  BP (!) 114/56   Pulse 60   Temp 97.5 °F (36.4 °C) (Axillary)   Resp 18   Ht 1.854 m (6' 1\")   Wt 81.9 kg (180 lb 8.9 oz)   SpO2 99%   BMI 23.82 kg/m²           No data found.    No data found.      Respiratory support needed (if any):  - RA    Admission weight Weight - Scale: 84.7 kg (186 lb 11.7 oz) (03/13/25 1228)    Today's weight    Wt Readings from Last 1 Encounters:   03/14/25 81.9 kg (180 lb 8.9 oz)        James need assessed each shift: YES -  - continue james r/t - strict intake and output  UOP >30ml/hr: YES  Last documented BM (in last 48 hrs):  No data found.             Restraints (in use currently or dc'd in last 12 hrs): Yes    Order current and documentation up to date? Yes    Lines/Drains reviewed @ bedside.  Peripheral IV 03/13/25 Posterior;Right Forearm (Active)   Number of days: 0       Urinary Catheter 03/13/25 James (Active)   Number of days: 0         Drip rates at handoff:    sodium chloride      3% sodium chloride Stopped (03/14/25 0053)       Lab Data:   CBC:   Recent Labs     03/13/25  1258 03/14/25  0417   WBC 23.8* 26.2*   HGB 13.0* 12.7*   HCT 36.6* 35.8*   MCV 96.8 97.1   PLT 1,075* 884*     BMP:    Recent Labs     03/13/25  2353 03/14/25  0417   * 112*   K 3.9 4.0   CO2 26 24   BUN 15 15   CREATININE 1.2 1.2     ABG: No results for input(s): \"PHART\", \"[id number]\", \"PO2ART\" in the last 72 hours.    Any consults during the shift? Yes: Consults received: : GI and Oncology    Any signed and held orders to be released?  No        4 Eyes

## 2025-03-14 NOTE — CONSULTS
[facility]    Consult Note      Requesting Physician:  candie    CHIEF COMPLAINT:  confusion       HISTORY OF PRESENT ILLNESS:    Mr. Norman  is a 79 y.o. male we are seeing in consultation for thrombocytosis and leukocytosis  We met last admit-he had high plts and  high wbc  I was suspicious for myeloprolif do and since ptls were near 1 million I started hydrea but full workup upon dc prior to this was pending   \he is readmitted for severe h yponatreamia-nephrology on board for this   Head ct neg  Pt was arousable but not sure he's coherent as he's in restraints  PTs bcr abl is positive   PLts are 900       ICD-10-CM    1. Hyponatremia  E87.1       2. Generalized weakness  R53.1       3. Dehydration  E86.0       4. Adverse effect of drug, initial encounter  T50.905A       5. Hiccups  R06.6       6. Agitation  R45.1              Past Medical History:  Past Medical History:   Diagnosis Date    Atrial fibrillation (HCC)     Basal cell carcinoma     right ear and right cheek    Bilateral tinnitus 2005    Bradycardia     required pacemaker     CAD (coronary artery disease)     4 stents placed     GERD (gastroesophageal reflux disease)     Hyperlipidemia     Hypertension     Osteoarthritis        Past Surgical History:  Past Surgical History:   Procedure Laterality Date    A-V CARDIAC PACEMAKER INSERTION      bradycardia     CORONARY ANGIOPLASTY WITH STENT PLACEMENT  2008    EP DEVICE PROCEDURE N/A 7/22/2024    Insert PPM dual performed by Shankar Carballo MD at [facility] CARDIAC CATH LAB    EXTERNAL EAR SURGERY Right     removed basal cell cancer    SKIN CANCER DESTRUCTION  03/2017    Right cheek       Current Medications:  Current Facility-Administered Medications   Medication Dose Route Frequency Provider Last Rate Last Admin    hydroxyurea (HYDREA) chemo capsule 1,000 mg  1,000 mg Oral Daily

## 2025-03-14 NOTE — PROGRESS NOTES
Name:  Maxx Norman /Age/Sex: 1945  (79 y.o. male)   MRN & CSN:  [id number] & [id number] Encounter Date/Time: 3/14/2025 12:56 PM EDT   Location:  [id number] PCP: Michelle Humphries MD     Attending:Jonah Newman MD       Maxx Norman is a 79 y.o. male with  has a past medical history of Atrial fibrillation (HCC), Basal cell carcinoma, Bilateral tinnitus, Bradycardia, CAD (coronary artery disease), GERD (gastroesophageal reflux disease), Hyperlipidemia, Hypertension, and Osteoarthritis. who presents with Hyponatremia    Hospital Day: 2    Assessment and Plan     Hyponatremia  Hypotension   Likely due to volume depletion and use of diuretics   Home diuretics stopped   Started on 3% saline   Fluid restriction   Pressors to keep MAP > 65   Nephrology following    Altered mental status   Due to above    Elevated LFT   RUQ US with hepatic steatosis   Monitor with LFT in AM   GI following   Might need MRCP    CML   BCR-ABL positive   On hydroxyurea   Hem onc following   Plan for outpatient tyrosine kinase inhibitor   Bone marrow biopsy ordered   CT chest to rule out lung mass    Objective:       Intake/Output Summary (Last 24 hours) at 3/14/2025 1256  Last data filed at 3/14/2025 1000  Gross per 24 hour   Intake 430.99 ml   Output 3390 ml   Net -2959.01 ml      Vitals:   Vitals:    25 0932 25 0935 25 0938 25 1000   BP: (!) 75/49 (!) 88/45 (!) 88/51 (!) 104/53   Pulse: 60 60 62 60   Resp: 12 13 13 12   Temp:       TempSrc:       SpO2: 98% 92% 99% 99%   Weight:       Height:           Interval history:     Seen and examined at bedside. Patient was agitated and required restraints overnight. Currently denies any complains    Physical Exam:        General: NAD  Eyes: EOMI  ENT: neck supple  Cardiovascular: Regular rate.  Respiratory: Clear to auscultation  Gastrointestinal: Soft, non tender  Genitourinary: no suprapubic tenderness  Musculoskeletal: No edema  Neuro: Alert and  acute cardiopulmonary process.       Total critical care time : 33 minutes      Jonah Newman MD

## 2025-03-14 NOTE — PROGRESS NOTES
Morning labs Na+ 112 perfect serve message sent to Dr. Bonner to see if he wanted 3% restarted per Dr. Bonner restart the 3% gtt.

## 2025-03-14 NOTE — CONSULTS
GASTROENTEROLOGY INPATIENT CONSULTATION        IDENTIFYING DATA/REASON FOR CONSULTATION   PATIENT:  Maxx Norman  MRN:  [id number]  ADMIT DATE: 3/13/2025  TIME OF EVALUATION: 3/14/2025 6:49 AM  HOSPITAL STAY:   LOS: 1 day     REASON FOR CONSULTATION:  abnormal LFTs    HISTORY OF PRESENT ILLNESS   Maxx Norman is a 79 y.o. male with a PMH of A-fib CAD hypertension hyperlipidemia GERD who presented on 3/13/2025 with fatigue. We have been consulted regarding abnormal LFTs.  Patient denies history of alcohol use or liver disease.  Denies a family history of liver disease.  Patient is hospitalized for bilateral lower extremity edema and hyponatremia.  Denies fever abdominal pain nausea vomiting hematemesis hematochezia melena or any other symptoms.    Prior Endoscopic Evaluations: none    PAST MEDICAL, SURGICAL, FAMILY, and SOCIAL HISTORY     Past Medical History:   Diagnosis Date    Atrial fibrillation (HCC)     Basal cell carcinoma     right ear and right cheek    Bilateral tinnitus 2005    Bradycardia     required pacemaker     CAD (coronary artery disease)     4 stents placed     GERD (gastroesophageal reflux disease)     Hyperlipidemia     Hypertension     Osteoarthritis      Past Surgical History:   Procedure Laterality Date    A-V CARDIAC PACEMAKER INSERTION      bradycardia     CORONARY ANGIOPLASTY WITH STENT PLACEMENT  2008    EP DEVICE PROCEDURE N/A 7/22/2024    Insert PPM dual performed by Shankar Carballo MD at [facility] CARDIAC CATH LAB    EXTERNAL EAR SURGERY Right     removed basal cell cancer    SKIN CANCER DESTRUCTION  03/2017    Right cheek     Family History   Problem Relation Age of Onset    Arthritis Mother     Heart Disease Father     Stroke Father     Other Brother         couldnt walk and in nursing home  disable     No Known Problems Maternal Grandmother     High Blood Pressure Maternal Grandfather     No Known Problems Paternal Grandmother     No Known Problems Paternal Grandfather      Social  intracranial abnormality.         XR CHEST PORTABLE   Final Result   No acute cardiopulmonary process.               ASSESSMENT AND RECOMMENDATIONS   Maxx Norman is a 79 y.o. male with a PMH of A-fib CAD hypertension hyperlipidemia GERD who presented on 3/13/2025 with fatigue. We have been consulted regarding abnormal LFTs.      IMPRESSION:    Abnormal LFTs   -AST 79 > ALT 41. Alk phos 138. Total bili 3.6. On 3/6 direct bili 1.1 and indirect 1.2.   -RUQ ultrasound 3/14 Fatty infiltration of the liver. Normal findings for gallbladder and common bile duct.   -Mixed vs cholestatic pattern. But most likely mixed pattern.   -Ordered liver serology labs. Will follow up with results  -Further recommendations from Dr. Ferrell    Hyponatremia  -Level at 115  -Primary team following    Metabolic encephalopathy  -Could be due to #2  -Primary team following    Acute urinary retention  -Status post Rivas catheter placement    5.  History paroxysmal A-fib  -On Xarelto    RECOMMENDATIONS:    Liver serology labs  Dr. Ferrell recommendations      If you have any questions or need any further information, please feel free to contact our consult team.  Thank you for allowing us to participate in the care of Maxx Norman.    The note was completed using Dragon voice recognition transcription. Every effort was made to ensure accuracy; however, inadvertent transcription errors may be present despite my best efforts to edit errors.      Julia Velez PA-C 3/14/2025 at 6:49 AM

## 2025-03-14 NOTE — PROGRESS NOTES
Per Dr. Orellana Turn the 3% gtt off if Na+ comes back 122 or more and Call her if Na+ comes back less than 118

## 2025-03-14 NOTE — PROGRESS NOTES
Received call from lab on pt's Na, 114.  Stopped the drip as per ordered parameter and result was communicated to on call Dr Ha TEJADA. Ordered to keep drip off and to check Na at 0400, goal serum is still 114-116.    Electronically signed by Ary Walsh RN on 3/14/2025 at 1:05 AM

## 2025-03-14 NOTE — PLAN OF CARE
Problem: Pain  Goal: Verbalizes/displays adequate comfort level or baseline comfort level  Outcome: Progressing     Problem: Safety - Adult  Goal: Free from fall injury  Outcome: Progressing     Problem: Skin/Tissue Integrity  Goal: Skin integrity remains intact  Description: 1.  Monitor for areas of redness and/or skin breakdown  2.  Assess vascular access sites hourly  3.  Every 4-6 hours minimum:  Change oxygen saturation probe site  4.  Every 4-6 hours:  If on nasal continuous positive airway pressure, respiratory therapy assess nares and determine need for appliance change or resting period  Outcome: Progressing  Flowsheets (Taken 3/13/2025 1930)  Skin Integrity Remains Intact: Monitor for areas of redness and/or skin breakdown     Problem: Safety - Medical Restraint  Goal: Remains free of injury from restraints (Restraint for Interference with Medical Device)  Description: INTERVENTIONS:  1. Determine that other, less restrictive measures have been tried or would not be effective before applying the restraint  2. Evaluate the patient's condition at the time of restraint application  3. Inform patient/family regarding the reason for restraint  4. Q2H: Monitor safety, psychosocial status, comfort, nutrition and hydration  Outcome: Progressing  Flowsheets  Taken 3/13/2025 2000 by Ary Walsh, RN  Remains free of injury from restraints (restraint for interference with medical device): Every 2 hours: Monitor safety, psychosocial status, comfort, nutrition and hydration

## 2025-03-14 NOTE — PROGRESS NOTES
Nephrology Progress Note   [email or web address]      Chief Complaint: Altered mental status    History of Present Illness: Patient is lethargic - history obtained from patients son and chart    Mr Norman is an 79 male with a past medical history significant for Hypertension, Hyperlipidemia, CML, CAD (stent LAD in ), A Fib, bradycardia s/p PPM and recent hospitalization at [facility] from 3/4/25 to 3/6/25 with volume overload that presented to the ED with altered mental status    Son stated he went to  his father to take to his Cardiology appointment and realized his father was not dressed appeared confused and could not follow instructions and that's when he decided to get to the ED. Upon arrival blood work revealed a serum sodium of 106 meq/L. He was also noted to be retaining urine so a james was inserted    Patient is making quite a bit of urine. He remains confused thrashing in bed. Nephrology consulted for critical life threatening Hyponatremia    Subjective:    In bed; more alert and awake today    ROS: no chest pain. No shortness of breath    Scheduled Meds:   hydroxyurea  1,000 mg Oral Daily    sodium chloride flush  5-40 mL IntraVENous 2 times per day    rivaroxaban  20 mg Oral Daily    atorvastatin  80 mg Oral Nightly    [Held by provider] tamsulosin  0.4 mg Oral Daily        norepinephrine      sodium chloride      3% sodium chloride Stopped (25 0053)       PRN Meds:.sodium chloride flush, sodium chloride, potassium chloride **OR** potassium alternative oral replacement **OR** potassium chloride, magnesium sulfate, ondansetron **OR** ondansetron, polyethylene glycol, acetaminophen **OR** acetaminophen, haloperidol lactate    Physical Exam:    TEMPERATURE:  Current - Temp: 97.9 °F (36.6 °C); Max - Temp  Av.5 °F (36.4 °C)  Min: 97.3 °F (36.3 °C)  Max: 97.9 °F (36.6 °C)  RESPIRATIONS RANGE: Resp  Av  Min: 11  Max: 24  PULSE RANGE: Pulse  Av.6  Min: 60  Max: 89  BLOOD PRESSURE

## 2025-03-15 LAB
ALBUMIN SERPL-MCNC: 3.6 G/DL (ref 3.4–5)
ALP SERPL-CCNC: 127 U/L (ref 40–129)
ALT SERPL-CCNC: 31 U/L (ref 10–40)
ANION GAP SERPL CALCULATED.3IONS-SCNC: 9 MMOL/L (ref 3–16)
AST SERPL-CCNC: 69 U/L (ref 15–37)
BILIRUB DIRECT SERPL-MCNC: 1.1 MG/DL (ref 0–0.3)
BILIRUB INDIRECT SERPL-MCNC: 1.3 MG/DL (ref 0–1)
BILIRUB SERPL-MCNC: 2.4 MG/DL (ref 0–1)
BUN SERPL-MCNC: 17 MG/DL (ref 7–20)
CALCIUM SERPL-MCNC: 8.7 MG/DL (ref 8.3–10.6)
CHLORIDE SERPL-SCNC: 90 MMOL/L (ref 99–110)
CO2 SERPL-SCNC: 22 MMOL/L (ref 21–32)
CREAT SERPL-MCNC: 1.1 MG/DL (ref 0.8–1.3)
DEPRECATED RDW RBC AUTO: 16 % (ref 12.4–15.4)
GFR SERPLBLD CREATININE-BSD FMLA CKD-EPI: 68 ML/MIN/{1.73_M2}
GLUCOSE SERPL-MCNC: 97 MG/DL (ref 70–99)
HAV AB SER QL IA: NEGATIVE
HCT VFR BLD AUTO: 31.9 % (ref 40.5–52.5)
HGB BLD-MCNC: 11.4 G/DL (ref 13.5–17.5)
MAGNESIUM SERPL-MCNC: 2.15 MG/DL (ref 1.8–2.4)
MCH RBC QN AUTO: 35 PG (ref 26–34)
MCHC RBC AUTO-ENTMCNC: 35.7 G/DL (ref 31–36)
MCV RBC AUTO: 98 FL (ref 80–100)
PHOSPHATE SERPL-MCNC: 2.8 MG/DL (ref 2.5–4.9)
PLATELET # BLD AUTO: 836 K/UL (ref 135–450)
PMV BLD AUTO: 8.8 FL (ref 5–10.5)
POTASSIUM SERPL-SCNC: 3.6 MMOL/L (ref 3.5–5.1)
PROT SERPL-MCNC: 6 G/DL (ref 6.4–8.2)
RBC # BLD AUTO: 3.26 M/UL (ref 4.2–5.9)
SMA IGG SER-ACNC: 16 UNITS (ref 0–19)
SODIUM SERPL-SCNC: 119 MMOL/L (ref 136–145)
SODIUM SERPL-SCNC: 119 MMOL/L (ref 136–145)
SODIUM SERPL-SCNC: 121 MMOL/L (ref 136–145)
SODIUM SERPL-SCNC: 122 MMOL/L (ref 136–145)
SODIUM SERPL-SCNC: 126 MMOL/L (ref 136–145)
SODIUM SERPL-SCNC: 126 MMOL/L (ref 136–145)
SODIUM SERPL-SCNC: 127 MMOL/L (ref 136–145)
SODIUM SERPL-SCNC: 127 MMOL/L (ref 136–145)
SODIUM SERPL-SCNC: 128 MMOL/L (ref 136–145)
SODIUM SERPL-SCNC: 129 MMOL/L (ref 136–145)
SODIUM SERPL-SCNC: 130 MMOL/L (ref 136–145)
SODIUM SERPL-SCNC: 130 MMOL/L (ref 136–145)
URATE SERPL-MCNC: 5.8 MG/DL (ref 3.5–7.2)
WBC # BLD AUTO: 20.1 K/UL (ref 4–11)

## 2025-03-15 PROCEDURE — 2580000003 HC RX 258: Performed by: INTERNAL MEDICINE

## 2025-03-15 PROCEDURE — 6370000000 HC RX 637 (ALT 250 FOR IP): Performed by: INTERNAL MEDICINE

## 2025-03-15 PROCEDURE — 2000000000 HC ICU R&B

## 2025-03-15 PROCEDURE — 80069 RENAL FUNCTION PANEL: CPT

## 2025-03-15 PROCEDURE — 84550 ASSAY OF BLOOD/URIC ACID: CPT

## 2025-03-15 PROCEDURE — 36415 COLL VENOUS BLD VENIPUNCTURE: CPT

## 2025-03-15 PROCEDURE — 83735 ASSAY OF MAGNESIUM: CPT

## 2025-03-15 PROCEDURE — 85027 COMPLETE CBC AUTOMATED: CPT

## 2025-03-15 PROCEDURE — 80076 HEPATIC FUNCTION PANEL: CPT

## 2025-03-15 PROCEDURE — 2500000003 HC RX 250 WO HCPCS: Performed by: HOSPITALIST

## 2025-03-15 PROCEDURE — 84295 ASSAY OF SERUM SODIUM: CPT

## 2025-03-15 PROCEDURE — 6370000000 HC RX 637 (ALT 250 FOR IP): Performed by: HOSPITALIST

## 2025-03-15 RX ADMIN — HYDROXYUREA 1000 MG: 500 CAPSULE ORAL at 07:54

## 2025-03-15 RX ADMIN — SODIUM CHLORIDE, PRESERVATIVE FREE 10 ML: 5 INJECTION INTRAVENOUS at 20:20

## 2025-03-15 RX ADMIN — SODIUM CHLORIDE 35 ML/HR: 3 INJECTION, SOLUTION INTRAVENOUS at 09:00

## 2025-03-15 RX ADMIN — ATORVASTATIN CALCIUM 80 MG: 80 TABLET, FILM COATED ORAL at 20:20

## 2025-03-15 RX ADMIN — SODIUM CHLORIDE, PRESERVATIVE FREE 10 ML: 5 INJECTION INTRAVENOUS at 07:55

## 2025-03-15 RX ADMIN — RIVAROXABAN 20 MG: 20 TABLET, FILM COATED ORAL at 07:54

## 2025-03-15 RX ADMIN — MIDODRINE HYDROCHLORIDE 5 MG: 5 TABLET ORAL at 07:54

## 2025-03-15 RX ADMIN — MIDODRINE HYDROCHLORIDE 5 MG: 5 TABLET ORAL at 13:34

## 2025-03-15 RX ADMIN — MIDODRINE HYDROCHLORIDE 5 MG: 5 TABLET ORAL at 18:23

## 2025-03-15 ASSESSMENT — PAIN SCALES - GENERAL
PAINLEVEL_OUTOF10: 0

## 2025-03-15 NOTE — PROGRESS NOTES
Dr. Petrona Orellana notified of . No new orders, continue with 3% sodium chloride IV infusion @35 mL/hr

## 2025-03-15 NOTE — PROGRESS NOTES
NAME:  Maxx Norman  YOB: 1945  MEDICAL RECORD NUMBER:  [id number]    Shift Summary: Alert/Oriented x2; disoriented to situation. Every 2 hour NA checks. Patient was on 3% Sodium Chloride and was held at 1536 when NA level reached 130.     Family updated: Yes:  Son Benito at bedside and updated at 1742    Rhythm:  V-Paced with PVCs/IVCD    Most recent vitals:   Visit Vitals  BP (!) 118/58   Pulse 66   Temp 97.9 °F (36.6 °C) (Oral)   Resp 17   Ht 1.854 m (6' 1\")   Wt 85.2 kg (187 lb 13.3 oz)   SpO2 99%   BMI 24.78 kg/m²           No data found.    No data found.      Respiratory support needed (if any):  - RA    Admission weight Weight - Scale: 84.7 kg (186 lb 11.7 oz) (03/13/25 1228)    Today's weight    Wt Readings from Last 1 Encounters:   03/15/25 85.2 kg (187 lb 13.3 oz)        James need assessed each shift: YES -  - continue james r/t - urinary retention   UOP >30ml/hr: YES  Last documented BM (in last 48 hrs):  No data found.             Restraints (in use currently or dc'd in last 12 hrs): No    Order current and documentation up to date? Yes    Lines/Drains reviewed @ bedside.  Peripheral IV 03/13/25 Posterior;Right Forearm (Active)   Number of days: 2       Peripheral IV 03/15/25 Right Cephalic (Active)   Number of days: 0       Urinary Catheter 03/13/25 James (Active)   Number of days: 2         Drip rates at handoff:    norepinephrine      [Held by provider] 3% sodium chloride Stopped (03/15/25 1536)    sodium chloride         Lab Data:   CBC:   Recent Labs     03/14/25  0417 03/15/25  0446   WBC 26.2* 20.1*   HGB 12.7* 11.4*   HCT 35.8* 31.9*   MCV 97.1 98.0   * 836*     BMP:    Recent Labs     03/14/25  1113 03/14/25  1729 03/15/25  0446 03/15/25  0744 03/15/25  1404 03/15/25  1538   *   < > 121*  122*   < > 130* 127*   K 3.8  --  3.6  --   --   --    CO2 24  --  22  --   --   --    BUN 14  --  17  --   --   --    CREATININE 1.3  --  1.1  --   --   --     < > = values

## 2025-03-15 NOTE — PROGRESS NOTES
Secure message sent to MD Bonner in regards to NA level 127 after stopping 3% sodium chloride. Lab at bedside now drawing 1800 NA level.

## 2025-03-15 NOTE — PROGRESS NOTES
NAME:  Maxx Norman  YOB: 1945  MEDICAL RECORD NUMBER:  [id number]    Shift Summary: pt is more alert/ follows commands. NA is trending up. Couple episodes of sleep apnea overnight.     Family updated: No    Rhythm:  V paced    Most recent vitals:   Visit Vitals  /65   Pulse 60   Temp 98.2 °F (36.8 °C) (Oral)   Resp 15   Ht 1.854 m (6' 1\")   Wt 85.2 kg (187 lb 13.3 oz)   SpO2 97%   BMI 24.78 kg/m²           No data found.    No data found.      Respiratory support needed (if any):  - RA    Admission weight Weight - Scale: 84.7 kg (186 lb 11.7 oz) (03/13/25 1228)    Today's weight    Wt Readings from Last 1 Encounters:   03/15/25 85.2 kg (187 lb 13.3 oz)        James need assessed each shift: YES -  - continue james r/t - urinary retention  UOP >30ml/hr: YES  Last documented BM (in last 48 hrs):  No data found.             Restraints (in use currently or dc'd in last 12 hrs): No    Order current and documentation up to date? Yes    Lines/Drains reviewed @ bedside.  Peripheral IV 03/13/25 Posterior;Right Forearm (Active)   Number of days: 1       Urinary Catheter 03/13/25 James (Active)   Number of days: 1         Drip rates at handoff:    norepinephrine      3% sodium chloride 35 mL/hr (03/14/25 2032)    sodium chloride         Lab Data:   CBC:   Recent Labs     03/14/25  0417 03/15/25  0446   WBC 26.2* 20.1*   HGB 12.7* 11.4*   HCT 35.8* 31.9*   MCV 97.1 98.0   * 836*     BMP:    Recent Labs     03/14/25  1113 03/14/25  1729 03/15/25  0129 03/15/25  0446   *   < > 119* 121*  122*   K 3.8  --   --  3.6   CO2 24  --   --  22   BUN 14  --   --  17   CREATININE 1.3  --   --  1.1    < > = values in this interval not displayed.     ABG: No results for input(s): \"PHART\", \"IKL1BGE\", \"PO2ART\" in the last 72 hours.    Any consults during the shift? No    Any signed and held orders to be released?  No        4 Eyes Skin Assessment       The patient is being assessed for  Shift

## 2025-03-15 NOTE — PROGRESS NOTES
ONCOLOGY HEMATOLOGY CARE PROGRESS NOTE      SUBJECTIVE:    Thrombocytosis improved, mentation improved      ROS:         OBJECTIVE          Physical    VITALS:  Patient Vitals for the past 24 hrs:   BP Temp Temp src Pulse Resp SpO2 Weight   03/15/25 1237 -- -- -- 66 19 97 % --   03/15/25 1200 (!) 110/50 -- -- 61 13 99 % --   03/15/25 1100 (!) 116/47 -- -- 66 16 99 % --   03/15/25 1056 (!) 116/47 98 °F (36.7 °C) Oral 88 14 100 % --   03/15/25 1000 (!) 113/54 -- -- 64 13 100 % --   03/15/25 0900 (!) 108/57 -- -- 64 19 99 % --   03/15/25 0800 (!) 129/56 -- -- 70 18 96 % --   03/15/25 0700 (!) 114/52 97.9 °F (36.6 °C) Oral 60 13 96 % --   03/15/25 0624 -- -- -- -- -- -- 85.2 kg (187 lb 13.3 oz)   03/15/25 0600 113/65 -- -- 60 15 97 % --   03/15/25 0530 (!) 120/58 -- -- 66 12 94 % --   03/15/25 0500 (!) 111/59 -- -- 61 13 96 % --   03/15/25 0430 125/60 -- -- 64 18 99 % --   03/15/25 0400 130/63 98.2 °F (36.8 °C) Oral 62 14 100 % --   03/15/25 0330 128/62 -- -- 62 16 99 % --   03/15/25 0300 121/68 -- -- 62 15 100 % --   03/15/25 0230 (!) 116/49 -- -- 64 14 97 % --   03/15/25 0200 (!) 126/43 -- -- 73 11 98 % --   03/15/25 0130 126/68 -- -- 66 15 99 % --   03/15/25 0100 (!) 97/38 -- -- 60 13 99 % --   03/15/25 0030 (!) 110/57 -- -- 62 19 99 % --   03/15/25 0000 111/71 98.1 °F (36.7 °C) Oral 76 28 93 % --   03/14/25 2330 -- -- -- 73 11 99 % --   03/14/25 2300 122/63 -- -- 74 16 95 % --   03/14/25 2230 124/60 -- -- 74 21 100 % --   03/14/25 2200 (!) 114/54 -- -- 67 19 97 % --   03/14/25 2130 107/60 -- -- 61 19 99 % --   03/14/25 2100 (!) 118/56 -- -- 60 14 98 % --   03/14/25 2030 (!) 110/53 -- -- 71 21 99 % --   03/14/25 2000 (!) 122/57 97.6 °F (36.4 °C) Oral 64 25 100 % --   03/14/25 1930 (!) 120/52 -- -- 76 10 100 % --   03/14/25 1900 91/73 -- -- 64 15 99 % --   03/14/25 1800 (!) 112/57 -- -- 83 17 100 % --   03/14/25 1700 (!) 125/58 -- -- 63 14 98 % --   03/14/25 1600 (!) 132/59 97.7

## 2025-03-15 NOTE — PROGRESS NOTES
Call placed to Savana in chemistry to check on sodium in process since 0744. Per Savana it should result in the next 10 minutes.

## 2025-03-15 NOTE — PROGRESS NOTES
INPATIENT CONSULTATION:    IDENTIFYING DATA/REASON FOR CONSULTATION   PATIENT:  Maxx Norman  MRN:  [id number]  ADMIT DATE: 3/13/2025  TIME OF EVALUATION: 3/15/2025 7:56 AM  HOSPITAL STAY:   LOS: 2 days   CONSULTING PHYSICIAN: Jonah Newman MD   REASON FOR CONSULTATION: Abnormal LFTs    Subjective:    Patient seen and examined in follow-up. Patient reports doing well.  He denies any complaints.    MEDICATIONS   SCHEDULED:  hydroxyurea, 1,000 mg, Daily  midodrine, 5 mg, TID WC  sodium chloride flush, 5-40 mL, 2 times per day  rivaroxaban, 20 mg, Daily  atorvastatin, 80 mg, Nightly  [Held by provider] tamsulosin, 0.4 mg, Daily      FLUIDS/DRIPS:     norepinephrine      3% sodium chloride 35 mL/hr (03/14/25 2032)    sodium chloride       PRNs: sodium chloride flush, 5-40 mL, PRN  sodium chloride, , PRN  potassium chloride, 40 mEq, PRN   Or  potassium alternative oral replacement, 40 mEq, PRN   Or  potassium chloride, 10 mEq, PRN  magnesium sulfate, 2,000 mg, PRN  ondansetron, 4 mg, Q8H PRN   Or  ondansetron, 4 mg, Q6H PRN  polyethylene glycol, 17 g, Daily PRN  acetaminophen, 650 mg, Q6H PRN   Or  acetaminophen, 650 mg, Q6H PRN  haloperidol lactate, 1 mg, Q4H PRN      ALLERGIES:    Allergies   Allergen Reactions    Bee Venom Anaphylaxis         PHYSICAL EXAM     Vitals:    03/15/25 0530 03/15/25 0600 03/15/25 0624 03/15/25 0700   BP: (!) 120/58 113/65  (!) 114/52   Pulse: 66 60  60   Resp: 12 15  13   Temp:    97.9 °F (36.6 °C)   TempSrc:    Oral   SpO2: 94% 97%  96%   Weight:   85.2 kg (187 lb 13.3 oz)    Height:           I/O last 3 completed shifts:  In: 3479.3 [P.O.:2590; IV Piggyback:889.3]  Out: 5550 [Urine:5550]    Physical Exam:  Gen: Resting in bed, NAD  HEENT: Normocephalic, atraumatic, no scleral icterus   CV: RRR no MRG   Pul: CTAB, normal work of breathing without wheezing  Abd: Good bowel sounds throughout, no scars, soft, NT/ND, no masses, no HSM   Ext: No edema, moves all 4 extremities.   Neuro:  Moves all four extremities, no gross deficits, follows commands, negative milkmaid sign  Skin: No jaundice, spider angiomas, palmar erythema    LABS AND IMAGING     Recent Results (from the past 24 hours)   ADILIA    Collection Time: 03/14/25  8:01 AM   Result Value Ref Range    ADILIA Negative Negative   Iron and TIBC    Collection Time: 03/14/25  8:01 AM   Result Value Ref Range    Iron 56 (L) 59 - 158 ug/dL    TIBC 255 (L) 260 - 445 ug/dL    Iron % Saturation 22 20 - 50 %   Ferritin    Collection Time: 03/14/25  8:01 AM   Result Value Ref Range    Ferritin 851.0 (H) 30.0 - 400.0 ng/mL   Hepatitis B Surface Antibody    Collection Time: 03/14/25  8:01 AM   Result Value Ref Range    Hep B S Ab <3.50 mIU/mL   Ammonia    Collection Time: 03/14/25  8:01 AM   Result Value Ref Range    Ammonia 34 16 - 60 umol/L   Basic Metabolic Panel w/ Reflex to MG    Collection Time: 03/14/25  8:01 AM   Result Value Ref Range    Sodium 115 (LL) 136 - 145 mmol/L    Potassium reflex Magnesium 3.9 3.5 - 5.1 mmol/L    Chloride 81 (L) 99 - 110 mmol/L    CO2 23 21 - 32 mmol/L    Anion Gap 11 3 - 16    Glucose 107 (H) 70 - 99 mg/dL    BUN 15 7 - 20 mg/dL    Creatinine 1.2 0.8 - 1.3 mg/dL    Est, Glom Filt Rate 61 >60    Calcium 9.3 8.3 - 10.6 mg/dL   Hepatic Function Panel    Collection Time: 03/14/25  8:01 AM   Result Value Ref Range    Total Protein 6.0 (L) 6.4 - 8.2 g/dL    Albumin 3.5 3.4 - 5.0 g/dL    Alkaline Phosphatase 119 40 - 129 U/L    ALT 34 10 - 40 U/L    AST 80 (H) 15 - 37 U/L    Total Bilirubin 3.0 (H) 0.0 - 1.0 mg/dL    Bilirubin, Direct 1.4 (H) 0.0 - 0.3 mg/dL    Bilirubin, Indirect 1.6 (H) 0.0 - 1.0 mg/dL   Basic Metabolic Panel w/ Reflex to MG    Collection Time: 03/14/25 11:13 AM   Result Value Ref Range    Sodium 116 (LL) 136 - 145 mmol/L    Potassium reflex Magnesium 3.8 3.5 - 5.1 mmol/L    Chloride 80 (L) 99 - 110 mmol/L    CO2 24 21 - 32 mmol/L    Anion Gap 12 3 - 16    Glucose 124 (H) 70 - 99 mg/dL    BUN 14 7 - 20 mg/dL

## 2025-03-15 NOTE — PROGRESS NOTES
Spoke with MD Bonner-per MD do not wait for NA to result and to restart 3% sodium chloride at this time.

## 2025-03-15 NOTE — PLAN OF CARE
Problem: Chronic Conditions and Co-morbidities  Goal: Patient's chronic conditions and co-morbidity symptoms are monitored and maintained or improved  Outcome: Progressing  Flowsheets (Taken 3/14/2025 2000)  Care Plan - Patient's Chronic Conditions and Co-Morbidity Symptoms are Monitored and Maintained or Improved:   Monitor and assess patient's chronic conditions and comorbid symptoms for stability, deterioration, or improvement   Collaborate with multidisciplinary team to address chronic and comorbid conditions and prevent exacerbation or deterioration   Update acute care plan with appropriate goals if chronic or comorbid symptoms are exacerbated and prevent overall improvement and discharge     Problem: Discharge Planning  Goal: Discharge to home or other facility with appropriate resources  Outcome: Progressing  Flowsheets (Taken 3/14/2025 2000)  Discharge to home or other facility with appropriate resources:   Identify barriers to discharge with patient and caregiver   Arrange for needed discharge resources and transportation as appropriate   Arrange for interpreters to assist at discharge as needed   Identify discharge learning needs (meds, wound care, etc)   Refer to discharge planning if patient needs post-hospital services based on physician order or complex needs related to functional status, cognitive ability or social support system     Problem: Pain  Goal: Verbalizes/displays adequate comfort level or baseline comfort level  Recent Flowsheet Documentation  Taken 3/14/2025 2000 by Ana Eli, RN  Verbalizes/displays adequate comfort level or baseline comfort level:   Encourage patient to monitor pain and request assistance   Assess pain using appropriate pain scale   Administer analgesics based on type and severity of pain and evaluate response   Implement non-pharmacological measures as appropriate and evaluate response   Consider cultural and social influences on pain and pain management      Problem: Safety - Adult  Goal: Free from fall injury  Outcome: Progressing     Problem: Skin/Tissue Integrity  Goal: Skin integrity remains intact  Description: 1.  Monitor for areas of redness and/or skin breakdown  2.  Assess vascular access sites hourly  3.  Every 4-6 hours minimum:  Change oxygen saturation probe site  4.  Every 4-6 hours:  If on nasal continuous positive airway pressure, respiratory therapy assess nares and determine need for appliance change or resting period  Outcome: Progressing  Flowsheets (Taken 3/14/2025 2000)  Skin Integrity Remains Intact:   Monitor for areas of redness and/or skin breakdown   Assess vascular access sites hourly   Every 4-6 hours minimum: Change oxygen saturation probe site   Every 4-6 hours: If on nasal continuous positive airway pressure, respiratory therapy assesses nares and determine need for appliance change or resting period     Problem: Safety - Medical Restraint  Goal: Remains free of injury from restraints (Restraint for Interference with Medical Device)  Description: INTERVENTIONS:  1. Determine that other, less restrictive measures have been tried or would not be effective before applying the restraint  2. Evaluate the patient's condition at the time of restraint application  3. Inform patient/family regarding the reason for restraint  4. Q2H: Monitor safety, psychosocial status, comfort, nutrition and hydration  Outcome: Progressing

## 2025-03-15 NOTE — PROGRESS NOTES
Nephrology Progress Note   [email or web address]      Chief Complaint: Altered mental status    History of Present Illness: Patient is lethargic - history obtained from patients son and chart    Mr Norman is an 79 male with a past medical history significant for Hypertension, Hyperlipidemia, CML, CAD (stent LAD in ), A Fib, bradycardia s/p PPM and recent hospitalization at [facility] from 3/4/25 to 3/6/25 with volume overload that presented to the ED with altered mental status    Son stated he went to  his father to take to his Cardiology appointment and realized his father was not dressed appeared confused and could not follow instructions and that's when he decided to get to the ED. Upon arrival blood work revealed a serum sodium of 106 meq/L. He was also noted to be retaining urine so a james was inserted    Patient is making quite a bit of urine. He remains confused thrashing in bed. Nephrology consulted for critical life threatening Hyponatremia    Subjective:    In bed; more alert and awake today; eating lunch    ROS: no chest pain. No shortness of breath    Scheduled Meds:   hydroxyurea  1,000 mg Oral Daily    midodrine  5 mg Oral TID WC    sodium chloride flush  5-40 mL IntraVENous 2 times per day    rivaroxaban  20 mg Oral Daily    atorvastatin  80 mg Oral Nightly    [Held by provider] tamsulosin  0.4 mg Oral Daily        norepinephrine      3% sodium chloride 35 mL/hr (03/15/25 1238)    sodium chloride         PRN Meds:.sodium chloride flush, sodium chloride, potassium chloride **OR** potassium alternative oral replacement **OR** potassium chloride, magnesium sulfate, ondansetron **OR** ondansetron, polyethylene glycol, acetaminophen **OR** acetaminophen, haloperidol lactate    Physical Exam:    TEMPERATURE:  Current - Temp: 98 °F (36.7 °C); Max - Temp  Av.9 °F (36.6 °C)  Min: 97.6 °F (36.4 °C)  Max: 98.2 °F (36.8 °C)  RESPIRATIONS RANGE: Resp  Av.4  Min: 10  Max: 28  PULSE RANGE: Pulse

## 2025-03-15 NOTE — PLAN OF CARE
Problem: Chronic Conditions and Co-morbidities  Goal: Patient's chronic conditions and co-morbidity symptoms are monitored and maintained or improved  3/15/2025 1432 by Rocio Cunningham RN  Outcome: Progressing  3/15/2025 0111 by Ana Eli RN  Outcome: Progressing  Flowsheets (Taken 3/14/2025 2000)  Care Plan - Patient's Chronic Conditions and Co-Morbidity Symptoms are Monitored and Maintained or Improved:   Monitor and assess patient's chronic conditions and comorbid symptoms for stability, deterioration, or improvement   Collaborate with multidisciplinary team to address chronic and comorbid conditions and prevent exacerbation or deterioration   Update acute care plan with appropriate goals if chronic or comorbid symptoms are exacerbated and prevent overall improvement and discharge     Problem: Discharge Planning  Goal: Discharge to home or other facility with appropriate resources  3/15/2025 1432 by Rocio Cunningham RN  Outcome: Progressing  3/15/2025 0111 by Ana Eli RN  Outcome: Progressing  Flowsheets (Taken 3/14/2025 2000)  Discharge to home or other facility with appropriate resources:   Identify barriers to discharge with patient and caregiver   Arrange for needed discharge resources and transportation as appropriate   Arrange for interpreters to assist at discharge as needed   Identify discharge learning needs (meds, wound care, etc)   Refer to discharge planning if patient needs post-hospital services based on physician order or complex needs related to functional status, cognitive ability or social support system     Problem: Pain  Goal: Verbalizes/displays adequate comfort level or baseline comfort level  Outcome: Progressing  Continuing to monitor pain and discomfort.  Monitoring pain level on scale of 0-10. Non- pharmacological measures encouraged to reduce discomfort/pain.  PRN pain meds administeration continues when/if applicable as ordered by physician.   -Pt  with no c/o pain/discomfort     Problem: Safety - Adult  Goal: Free from fall injury  3/15/2025 1432 by Rocio Cunningham, RN  Outcome: Progressing     Problem: Skin/Tissue Integrity  Goal: Skin integrity remains intact  Description: 1.  Monitor for areas of redness and/or skin breakdown  2.  Assess vascular access sites hourly  3.  Every 4-6 hours minimum:  Change oxygen saturation probe site  4.  Every 4-6 hours:  If on nasal continuous positive airway pressure, respiratory therapy assess nares and determine need for appliance change or resting period  3/15/2025 1432 by Rocio Cunningham, RN  Outcome: Progressing     Problem: Safety - Medical Restraint  Goal: Remains free of injury from restraints (Restraint for Interference with Medical Device)  Description: INTERVENTIONS:  1. Determine that other, less restrictive measures have been tried or would not be effective before applying the restraint  2. Evaluate the patient's condition at the time of restraint application  3. Inform patient/family regarding the reason for restraint  4. Q2H: Monitor safety, psychosocial status, comfort, nutrition and hydration  3/15/2025 1432 by Rocio Cunningham, RN  Outcome: Completed   -patient removed from restraints 3/14/2025

## 2025-03-15 NOTE — PROGRESS NOTES
Name:  Maxx Norman /Age/Sex: 1945  (79 y.o. male)   MRN & CSN:  [id number] & [id number] Encounter Date/Time: 3/15/2025 12:56 PM EDT   Location:  [id number] PCP: Michelle Humphries MD     Attending:Jonah Newman MD       Maxx Norman is a 79 y.o. male with  has a past medical history of Atrial fibrillation (HCC), Basal cell carcinoma, Bilateral tinnitus, Bradycardia, CAD (coronary artery disease), GERD (gastroesophageal reflux disease), Hyperlipidemia, Hypertension, and Osteoarthritis. who presents with Hyponatremia    Hospital Day: 3    Assessment and Plan     Hyponatremia  Hypotension   Likely due to volume depletion and use of diuretics   Home diuretics stopped   Continue 3% saline   Fluid restriction   Pressors to keep MAP > 65   Nephrology following    Altered mental status   Due to above and improving    Paroxysmal atrial fibrillation   Rate controlled   Continue home Xarelto    Elevated LFT   Unclear etiology   RUQ US with hepatic steatosis   Monitor with LFT in AM   GI following   Can consider MRCP if LFT does not improve    CML   BCR-ABL positive   On hydroxyurea   Hem onc following   Plan for outpatient tyrosine kinase inhibitor   Bone marrow biopsy    CT chest negative for lung mass    Objective:       Intake/Output Summary (Last 24 hours) at 3/15/2025 1218  Last data filed at 3/15/2025 1052  Gross per 24 hour   Intake 2993.41 ml   Output 3250 ml   Net -256.59 ml      Vitals:   Vitals:    03/15/25 0900 03/15/25 1000 03/15/25 1056 03/15/25 1100   BP: (!) 108/57 (!) 113/54 (!) 116/47 (!) 116/47   Pulse: 64 64 88 66   Resp: 19 13 14 16   Temp:   98 °F (36.7 °C)    TempSrc:   Oral    SpO2: 99% 100% 100% 99%   Weight:       Height:           Interval history:     Seen and examined at bedside. No acute events overnight, mentation improving. Reports doing well and denies any complaints    Physical Exam:        General: NAD  Eyes: EOMI  ENT: neck supple  Cardiovascular: Regular  rate.  Respiratory: Clear to auscultation  Gastrointestinal: Soft, non tender  Genitourinary: no suprapubic tenderness  Musculoskeletal: No edema  Neuro: Alert and oriented*2  Psych: Mood appropriate.     Review of Systems:      10 point ROS negative except stated above    Medications:   Medications:    hydroxyurea  1,000 mg Oral Daily    midodrine  5 mg Oral TID WC    sodium chloride flush  5-40 mL IntraVENous 2 times per day    rivaroxaban  20 mg Oral Daily    atorvastatin  80 mg Oral Nightly    [Held by provider] tamsulosin  0.4 mg Oral Daily      Infusions:    norepinephrine      3% sodium chloride 35 mL/hr (03/15/25 1050)    sodium chloride       PRN Meds: sodium chloride flush, 5-40 mL, PRN  sodium chloride, , PRN  potassium chloride, 40 mEq, PRN   Or  potassium alternative oral replacement, 40 mEq, PRN   Or  potassium chloride, 10 mEq, PRN  magnesium sulfate, 2,000 mg, PRN  ondansetron, 4 mg, Q8H PRN   Or  ondansetron, 4 mg, Q6H PRN  polyethylene glycol, 17 g, Daily PRN  acetaminophen, 650 mg, Q6H PRN   Or  acetaminophen, 650 mg, Q6H PRN  haloperidol lactate, 1 mg, Q4H PRN        Labs and Imaging     Recent Labs     03/13/25  1258 03/13/25  1545 03/14/25  0417 03/14/25  0801 03/14/25  1113 03/14/25  1729 03/15/25  0446 03/15/25  0744 03/15/25  1008   WBC 23.8*  --  26.2*  --   --   --  20.1*  --   --    HGB 13.0*  --  12.7*  --   --   --  11.4*  --   --    PLT 1,075*  --  884*  --   --   --  836*  --   --    MCV 96.8  --  97.1  --   --   --  98.0  --   --    *   < > 112* 115* 116*   < > 121*  122* 126* 126*   K 4.4   < > 4.0 3.9 3.8  --  3.6  --   --    CL 72*   < > 78* 81* 80*  --  90*  --   --    CO2 21   < > 24 23 24  --  22  --   --    BUN 14   < > 15 15 14  --  17  --   --    CREATININE 1.2   < > 1.2 1.2 1.3  --  1.1  --   --    GLUCOSE 122*   < > 122* 107* 124*  --  97  --   --    MG  --   --  1.73*  --   --   --  2.15  --   --    PHOS  --   --   --   --   --   --  2.8  --   --    CALCIUM 9.9   <

## 2025-03-16 LAB
ALBUMIN SERPL-MCNC: 3.5 G/DL (ref 3.4–5)
ALP SERPL-CCNC: 138 U/L (ref 40–129)
ALT SERPL-CCNC: 34 U/L (ref 10–40)
ANION GAP SERPL CALCULATED.3IONS-SCNC: 9 MMOL/L (ref 3–16)
AST SERPL-CCNC: 62 U/L (ref 15–37)
BILIRUB DIRECT SERPL-MCNC: 1 MG/DL (ref 0–0.3)
BILIRUB INDIRECT SERPL-MCNC: 1.1 MG/DL (ref 0–1)
BILIRUB SERPL-MCNC: 2.1 MG/DL (ref 0–1)
BUN SERPL-MCNC: 13 MG/DL (ref 7–20)
CALCIUM SERPL-MCNC: 9.1 MG/DL (ref 8.3–10.6)
CHLORIDE SERPL-SCNC: 99 MMOL/L (ref 99–110)
CO2 SERPL-SCNC: 23 MMOL/L (ref 21–32)
CREAT SERPL-MCNC: 1.1 MG/DL (ref 0.8–1.3)
DEPRECATED RDW RBC AUTO: 16.2 % (ref 12.4–15.4)
GFR SERPLBLD CREATININE-BSD FMLA CKD-EPI: 68 ML/MIN/{1.73_M2}
GLUCOSE SERPL-MCNC: 99 MG/DL (ref 70–99)
HCT VFR BLD AUTO: 34.3 % (ref 40.5–52.5)
HGB BLD-MCNC: 11.9 G/DL (ref 13.5–17.5)
MAGNESIUM SERPL-MCNC: 2.03 MG/DL (ref 1.8–2.4)
MCH RBC QN AUTO: 34.9 PG (ref 26–34)
MCHC RBC AUTO-ENTMCNC: 34.6 G/DL (ref 31–36)
MCV RBC AUTO: 100.8 FL (ref 80–100)
OSMOLALITY UR: 596 MOSM/KG (ref 390–1070)
PHOSPHATE SERPL-MCNC: 2.4 MG/DL (ref 2.5–4.9)
PLATELET # BLD AUTO: 953 K/UL (ref 135–450)
PMV BLD AUTO: 8.4 FL (ref 5–10.5)
POTASSIUM SERPL-SCNC: 3.9 MMOL/L (ref 3.5–5.1)
PROT SERPL-MCNC: 6 G/DL (ref 6.4–8.2)
RBC # BLD AUTO: 3.41 M/UL (ref 4.2–5.9)
SODIUM SERPL-SCNC: 130 MMOL/L (ref 136–145)
SODIUM SERPL-SCNC: 131 MMOL/L (ref 136–145)
SODIUM SERPL-SCNC: 132 MMOL/L (ref 136–145)
SODIUM SERPL-SCNC: 132 MMOL/L (ref 136–145)
SODIUM SERPL-SCNC: 134 MMOL/L (ref 136–145)
SODIUM UR-SCNC: 142 MMOL/L
URATE SERPL-MCNC: 5.7 MG/DL (ref 3.5–7.2)
WBC # BLD AUTO: 23.4 K/UL (ref 4–11)

## 2025-03-16 PROCEDURE — 84300 ASSAY OF URINE SODIUM: CPT

## 2025-03-16 PROCEDURE — 80076 HEPATIC FUNCTION PANEL: CPT

## 2025-03-16 PROCEDURE — 84295 ASSAY OF SERUM SODIUM: CPT

## 2025-03-16 PROCEDURE — 80069 RENAL FUNCTION PANEL: CPT

## 2025-03-16 PROCEDURE — 6370000000 HC RX 637 (ALT 250 FOR IP): Performed by: INTERNAL MEDICINE

## 2025-03-16 PROCEDURE — 83935 ASSAY OF URINE OSMOLALITY: CPT

## 2025-03-16 PROCEDURE — 94760 N-INVAS EAR/PLS OXIMETRY 1: CPT

## 2025-03-16 PROCEDURE — 36415 COLL VENOUS BLD VENIPUNCTURE: CPT

## 2025-03-16 PROCEDURE — 83735 ASSAY OF MAGNESIUM: CPT

## 2025-03-16 PROCEDURE — 2500000003 HC RX 250 WO HCPCS: Performed by: INTERNAL MEDICINE

## 2025-03-16 PROCEDURE — 2500000003 HC RX 250 WO HCPCS: Performed by: HOSPITALIST

## 2025-03-16 PROCEDURE — 2060000000 HC ICU INTERMEDIATE R&B

## 2025-03-16 PROCEDURE — 2580000003 HC RX 258: Performed by: INTERNAL MEDICINE

## 2025-03-16 PROCEDURE — 84550 ASSAY OF BLOOD/URIC ACID: CPT

## 2025-03-16 PROCEDURE — 85027 COMPLETE CBC AUTOMATED: CPT

## 2025-03-16 PROCEDURE — 6370000000 HC RX 637 (ALT 250 FOR IP): Performed by: HOSPITALIST

## 2025-03-16 RX ORDER — SODIUM CHLORIDE 1 G/1
1 TABLET ORAL
Status: DISCONTINUED | OUTPATIENT
Start: 2025-03-16 | End: 2025-03-19

## 2025-03-16 RX ADMIN — MIDODRINE HYDROCHLORIDE 5 MG: 5 TABLET ORAL at 12:17

## 2025-03-16 RX ADMIN — SODIUM CHLORIDE, PRESERVATIVE FREE 10 ML: 5 INJECTION INTRAVENOUS at 07:41

## 2025-03-16 RX ADMIN — HYDROXYUREA 1000 MG: 500 CAPSULE ORAL at 07:39

## 2025-03-16 RX ADMIN — POLYETHYLENE GLYCOL 3350 17 G: 17 POWDER, FOR SOLUTION ORAL at 13:28

## 2025-03-16 RX ADMIN — SODIUM PHOSPHATE, MONOBASIC, MONOHYDRATE AND SODIUM PHOSPHATE, DIBASIC, ANHYDROUS 10 MMOL: 142; 276 INJECTION, SOLUTION INTRAVENOUS at 12:48

## 2025-03-16 RX ADMIN — ATORVASTATIN CALCIUM 80 MG: 80 TABLET, FILM COATED ORAL at 20:12

## 2025-03-16 RX ADMIN — Medication 1 G: at 17:15

## 2025-03-16 RX ADMIN — SODIUM CHLORIDE 35 ML/HR: 3 INJECTION, SOLUTION INTRAVENOUS at 04:14

## 2025-03-16 RX ADMIN — SODIUM CHLORIDE, PRESERVATIVE FREE 10 ML: 5 INJECTION INTRAVENOUS at 20:12

## 2025-03-16 RX ADMIN — Medication 1 G: at 13:22

## 2025-03-16 RX ADMIN — MIDODRINE HYDROCHLORIDE 5 MG: 5 TABLET ORAL at 07:39

## 2025-03-16 RX ADMIN — RIVAROXABAN 20 MG: 20 TABLET, FILM COATED ORAL at 07:39

## 2025-03-16 ASSESSMENT — PAIN SCALES - GENERAL
PAINLEVEL_OUTOF10: 0

## 2025-03-16 NOTE — PROGRESS NOTES
Pt stated \"I have not had a bowel movement for a few days\" Offered pt PRN Mirilax. Pt agreed.  See MAR

## 2025-03-16 NOTE — PROGRESS NOTES
Nephrology Progress Note   [email or web address]      Chief Complaint: Altered mental status    History of Present Illness: Patient is lethargic - history obtained from patients son and chart    Mr Norman is an 79 male with a past medical history significant for Hypertension, Hyperlipidemia, CML, CAD (stent LAD in ), A Fib, bradycardia s/p PPM and recent hospitalization at [facility] from 3/4/25 to 3/6/25 with volume overload that presented to the ED with altered mental status    Son stated he went to  his father to take to his Cardiology appointment and realized his father was not dressed appeared confused and could not follow instructions and that's when he decided to get to the ED. Upon arrival blood work revealed a serum sodium of 106 meq/L. He was also noted to be retaining urine so a james was inserted    Patient is making quite a bit of urine. He remains confused thrashing in bed. Nephrology consulted for critical life threatening Hyponatremia    Subjective:    In bed; more alert and awake today    ROS: no chest pain. No shortness of breath    Scheduled Meds:   sodium chloride  1 g Oral TID WC    sodium phosphate IVPB (PERIPHERAL line)  10 mmol IntraVENous Once    hydroxyurea  1,000 mg Oral Daily    midodrine  5 mg Oral TID WC    sodium chloride flush  5-40 mL IntraVENous 2 times per day    rivaroxaban  20 mg Oral Daily    atorvastatin  80 mg Oral Nightly    [Held by provider] tamsulosin  0.4 mg Oral Daily        norepinephrine      sodium chloride         PRN Meds:.sodium chloride flush, sodium chloride, potassium chloride **OR** potassium alternative oral replacement **OR** potassium chloride, magnesium sulfate, ondansetron **OR** ondansetron, polyethylene glycol, acetaminophen **OR** acetaminophen, haloperidol lactate    Physical Exam:    TEMPERATURE:  Current - Temp: 98.2 °F (36.8 °C); Max - Temp  Av °F (36.7 °C)  Min: 97.2 °F (36.2 °C)  Max: 98.6 °F (37 °C)  RESPIRATIONS RANGE: Resp  Avg: 15.4   AM     Phosphorus:    Lab Results   Component Value Date/Time    PHOS 2.4 03/16/2025 04:53 AM     U/A:    Lab Results   Component Value Date/Time    NITRITE neg 09/19/2011 12:00 AM    COLORU Yellow 03/13/2025 12:52 PM    PHUR 7.5 03/13/2025 12:52 PM    PHUR 7.5 05/26/2022 02:30 AM    CLARITYU Clear 03/13/2025 12:52 PM    SPECGRAV 1.015 09/19/2011 12:00 AM    LEUKOCYTESUR Negative 03/13/2025 12:52 PM    UROBILINOGEN 0.2 03/13/2025 12:52 PM    BILIRUBINUR Negative 03/13/2025 12:52 PM    BLOODU Negative 03/13/2025 12:52 PM    GLUCOSEU Negative 03/13/2025 12:52 PM         IMPRESSION/RECOMMENDATIONS:      Principal Problem:    Hyponatremia  Resolved Problems:    * No resolved hospital problems. *      Hyponatremia - Etiology likely multifactorial (Thiazide diuretic/Volume depletion/Polydipsia/poor appetite and as a result probable low solute intake)  - Stopped all diuretics (patient was on Torsemide/Chlorthalidone and Aldactone)  - Given altered mental status started 3 % Saline 3/13/25  - Sodium correcting at desired rate   - Stopped 3 % saline on 3/16/25  - Initial U osm 220; U Na 73  - Repeat U Osm 596 and U Na 142 on 3/16/25 consistent with SIADH  - Continue FR 1.5 L / day and start sodium chloride tablets 3/16/25    2. Altered mental status likely secondary to No 1 - Improving    3. HTN - controlled    4. New diagnosis of CML

## 2025-03-16 NOTE — PROGRESS NOTES
INPATIENT CONSULTATION:    IDENTIFYING DATA/REASON FOR CONSULTATION   PATIENT:  Maxx Norman  MRN:  [id number]  ADMIT DATE: 3/13/2025  TIME OF EVALUATION: 3/16/2025 5:40 AM  HOSPITAL STAY:   LOS: 3 days   CONSULTING PHYSICIAN: Jonah Newman MD   REASON FOR CONSULTATION: Abnormal LFTs    Subjective:    Patient seen and examined in follow-up. Patient reports doing well.  He denies any complaints.    MEDICATIONS   SCHEDULED:  hydroxyurea, 1,000 mg, Daily  midodrine, 5 mg, TID WC  sodium chloride flush, 5-40 mL, 2 times per day  rivaroxaban, 20 mg, Daily  atorvastatin, 80 mg, Nightly  [Held by provider] tamsulosin, 0.4 mg, Daily      FLUIDS/DRIPS:     norepinephrine      3% sodium chloride 35 mL/hr (03/16/25 0414)    sodium chloride       PRNs: sodium chloride flush, 5-40 mL, PRN  sodium chloride, , PRN  potassium chloride, 40 mEq, PRN   Or  potassium alternative oral replacement, 40 mEq, PRN   Or  potassium chloride, 10 mEq, PRN  magnesium sulfate, 2,000 mg, PRN  ondansetron, 4 mg, Q8H PRN   Or  ondansetron, 4 mg, Q6H PRN  polyethylene glycol, 17 g, Daily PRN  acetaminophen, 650 mg, Q6H PRN   Or  acetaminophen, 650 mg, Q6H PRN  haloperidol lactate, 1 mg, Q4H PRN      ALLERGIES:    Allergies   Allergen Reactions    Bee Venom Anaphylaxis         PHYSICAL EXAM     Vitals:    03/16/25 0300 03/16/25 0330 03/16/25 0400 03/16/25 0440   BP: 129/60  (!) 135/54    Pulse: 60 60 64    Resp: 13 12 14    Temp:   97.8 °F (36.6 °C)    TempSrc:   Axillary    SpO2: 96% 98% 96%    Weight:    82.4 kg (181 lb 10.5 oz)   Height:    1.88 m (6' 2\")       I/O last 3 completed shifts:  In: 4360.1 [P.O.:3070; I.V.:10; IV Piggyback:1280.1]  Out: 4080 [Urine:4080]    Physical Exam:  Gen: Resting in bed, NAD  HEENT: Normocephalic, atraumatic, no scleral icterus   CV: RRR no MRG   Pul: CTAB, normal work of breathing without wheezing  Abd: Good bowel sounds throughout, no scars, soft, NT/ND, no masses, no HSM   Ext: No edema, moves all 4

## 2025-03-16 NOTE — PLAN OF CARE
Problem: Chronic Conditions and Co-morbidities  Goal: Patient's chronic conditions and co-morbidity symptoms are monitored and maintained or improved  Outcome: Progressing  Flowsheets (Taken 3/15/2025 2000)  Care Plan - Patient's Chronic Conditions and Co-Morbidity Symptoms are Monitored and Maintained or Improved:   Monitor and assess patient's chronic conditions and comorbid symptoms for stability, deterioration, or improvement   Collaborate with multidisciplinary team to address chronic and comorbid conditions and prevent exacerbation or deterioration   Update acute care plan with appropriate goals if chronic or comorbid symptoms are exacerbated and prevent overall improvement and discharge     Problem: Discharge Planning  Goal: Discharge to home or other facility with appropriate resources  Outcome: Progressing  Flowsheets (Taken 3/15/2025 2000)  Discharge to home or other facility with appropriate resources:   Identify barriers to discharge with patient and caregiver   Arrange for interpreters to assist at discharge as needed   Identify discharge learning needs (meds, wound care, etc)   Arrange for needed discharge resources and transportation as appropriate     Problem: Pain  Goal: Verbalizes/displays adequate comfort level or baseline comfort level  Outcome: Progressing  Flowsheets (Taken 3/15/2025 2000)  Verbalizes/displays adequate comfort level or baseline comfort level:   Encourage patient to monitor pain and request assistance   Assess pain using appropriate pain scale   Administer analgesics based on type and severity of pain and evaluate response   Implement non-pharmacological measures as appropriate and evaluate response   Notify Licensed Independent Practitioner if interventions unsuccessful or patient reports new pain   Consider cultural and social influences on pain and pain management     Problem: Safety - Adult  Goal: Free from fall injury  Outcome: Progressing     Problem: Skin/Tissue  8.5 Welsh drainage catheter placed in perihepatic collection, pigtail at the liver dome, subhepatic entry    42cc pus aspirated

## 2025-03-16 NOTE — PROGRESS NOTES
NAME:  Maxx Norman  YOB: 1945  MEDICAL RECORD NUMBER:  [id number]    Shift Summary: VSS throughout shift.Got him up to use standing scale tolerated well. Pt is gaining some strength back, still disoriented at times but easily redirected. Last Na 131.     Family updated: No    Rhythm:  V-paced    Most recent vitals:   Visit Vitals  BP (!) 120/59   Pulse 64   Temp 97.8 °F (36.6 °C) (Axillary)   Resp 18   Ht 1.88 m (6' 2\")   Wt 82.4 kg (181 lb 10.5 oz)   SpO2 100%   BMI 23.32 kg/m²           No data found.    No data found.      Respiratory support needed (if any):  - RA    Admission weight Weight - Scale: 84.7 kg (186 lb 11.7 oz) (03/13/25 1228)    Today's weight    Wt Readings from Last 1 Encounters:   03/16/25 82.4 kg (181 lb 10.5 oz)        James need assessed each shift: YES -  - continue james r/t - urinary retention  UOP >30ml/hr: YES  Last documented BM (in last 48 hrs):  No data found.             Restraints (in use currently or dc'd in last 12 hrs): No    Order current and documentation up to date? Yes    Lines/Drains reviewed @ bedside.  Peripheral IV 03/13/25 Posterior;Right Forearm (Active)   Number of days: 2       Peripheral IV 03/15/25 Right Cephalic (Active)   Number of days: 0       Urinary Catheter 03/13/25 James (Active)   Number of days: 2         Drip rates at handoff:    norepinephrine      3% sodium chloride 35 mL/hr (03/16/25 0414)    sodium chloride         Lab Data:   CBC:   Recent Labs     03/15/25  0446 03/16/25  0453   WBC 20.1* 23.4*   HGB 11.4* 11.9*   HCT 31.9* 34.3*   MCV 98.0 100.8*   * 953*     BMP:    Recent Labs     03/15/25  0446 03/15/25  0744 03/16/25  0223 03/16/25  0453   *  122*   < > 131* 131*   K 3.6  --   --  3.9   CO2 22  --   --  23   BUN 17  --   --  13   CREATININE 1.1  --   --  1.1    < > = values in this interval not displayed.     ABG: No results for input(s): \"PHART\", \"TXY9EVN\", \"PO2ART\" in the last 72 hours.    Any consults during  the shift? No    Any signed and held orders to be released?  No        4 Eyes Skin Assessment       The patient is being assessed for  Shift Handoff    I agree that at least one RN has performed a thorough Head to Toe Skin Assessment on the patient. ALL assessment sites listed below have been assessed.      Areas assessed by both nurses: Head, Face, Ears, Shoulders, Back, Chest, Arms, Elbows, Hands, Sacrum. Buttock, Coccyx, Ischium, Legs. Feet and Heels, and Under Medical Devices         Does the Patient have a Wound? No noted wound(s)  Abrasion to left buttocks    Wound Care Orders initiated by RN: No       Abdirashid Prevention initiated by RN: Yes    Pressure Injury (Stage 3,4, Unstageable, DTI, NWPT, and Complex wounds) if present, place Wound referral order by RN under : No    New Ostomies, if present place, Ostomy referral order under : No     Nurse 1 eSignature: Electronically signed by Ana Eli RN on 3/16/25 at 6:51 AM EDT    **SHARE this note so that the co-signing nurse can place an eSignature**    Nurse 2 eSignature: Electronically signed by Rocio Cunningham RN on 3/16/25 at 10:46 AM EDT

## 2025-03-16 NOTE — PROGRESS NOTES
Name:  Maxx Norman /Age/Sex: 1945  (79 y.o. male)   MRN & CSN:  [id number] & [id number] Encounter Date/Time: 3/16/2025 12:56 PM EDT   Location:  [id number] PCP: Michelle Humphries MD     Attending:oJnah Newman MD       Maxx Norman is a 79 y.o. male with  has a past medical history of Atrial fibrillation (HCC), Basal cell carcinoma, Bilateral tinnitus, Bradycardia, CAD (coronary artery disease), GERD (gastroesophageal reflux disease), Hyperlipidemia, Hypertension, and Osteoarthritis. 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    CML   BCR-ABL positive   On hydroxyurea   Hem onc following   Plan for outpatient tyrosine kinase inhibitor   Bone marrow biopsy    CT chest negative for lung mass    Objective:       Intake/Output Summary (Last 24 hours) at 3/16/2025 1050  Last data filed at 3/16/2025 0946  Gross per 24 hour   Intake 1557.42 ml   Output 1790 ml   Net -232.58 ml      Vitals:   Vitals:    25 0726 25 0800 25 0900 25 1000   BP:  (!) 141/66 (!) 136/51 (!) 134/46   Pulse: 68 80 60 60   Resp: 14 18 15 17   Temp: 98.1 °F (36.7 °C)      TempSrc: Oral      SpO2: 100% 100% 99% 97%   Weight:       Height:           Interval history:     Seen and examined at bedside. No acute events overnight, continues to be on 3% normal saline    Physical Exam:        General: Hard of hearing  Eyes: EOMI  ENT: neck supple  Cardiovascular:

## 2025-03-16 NOTE — PROGRESS NOTES
Spoke with MD Newman in regards to james for urinary retention and to see if MD would like to do voiding trial today. Per MD Newman keep james in for today and plan on voiding trial tomorrow. Nursing communication order placed.

## 2025-03-16 NOTE — PROGRESS NOTES
NAME:  Maxx Norman  YOB: 1945  MEDICAL RECORD NUMBER:  [id number]    Shift Summary: Pt alery with some confusion to year and situation. #% hypertonic gtt discontinued and NA checks changed to every 8 hours per Nephro with last  at noon. Pt moved from 2114 to 2105. Placed call to MD Newman about removing James and per MD will remove and do voiding trial tomorrow.     Family updated: Yes:  Benito at bedside and update and aware of new room.     Rhythm: Paced V    Most recent vitals:   Visit Vitals  BP (!) 151/60   Pulse 65   Temp 98 °F (36.7 °C) (Oral)   Resp 19   Ht 1.88 m (6' 2\")   Wt 82.4 kg (181 lb 10.5 oz)   SpO2 99%   BMI 23.32 kg/m²           No data found.    No data found.      Respiratory support needed (if any):  - RA    Admission weight Weight - Scale: 84.7 kg (186 lb 11.7 oz) (03/13/25 1228)    Today's weight    Wt Readings from Last 1 Encounters:   03/16/25 82.4 kg (181 lb 10.5 oz)        James need assessed each shift: YES -  - continue james r/t - urinary retention.   UOP >30ml/hr: YES  Last documented BM (in last 48 hrs):  No data found.             Restraints (in use currently or dc'd in last 12 hrs): No    Order current and documentation up to date? Yes    Lines/Drains reviewed @ bedside.  Peripheral IV 03/13/25 Posterior;Right Forearm (Active)   Number of days: 3       Peripheral IV 03/15/25 Right Cephalic (Active)   Number of days: 1       Urinary Catheter 03/13/25 James (Active)   Number of days: 3         Drip rates at handoff:    sodium chloride         Lab Data:   CBC:   Recent Labs     03/15/25  0446 03/16/25  0453   WBC 20.1* 23.4*   HGB 11.4* 11.9*   HCT 31.9* 34.3*   MCV 98.0 100.8*   * 953*     BMP:    Recent Labs     03/15/25  0446 03/15/25  0744 03/16/25  0453 03/16/25  0758 03/16/25  1008 03/16/25  1149   *  122*   < > 131*   < > 131* 134*   K 3.6  --  3.9  --   --   --    CO2 22  --  23  --   --   --    BUN 17  --  13  --   --   --    CREATININE

## 2025-03-17 ENCOUNTER — APPOINTMENT (OUTPATIENT)
Dept: CT IMAGING | Age: 80
End: 2025-03-17
Payer: MEDICARE

## 2025-03-17 LAB
ANION GAP SERPL CALCULATED.3IONS-SCNC: 14 MMOL/L (ref 3–16)
ANION GAP SERPL CALCULATED.3IONS-SCNC: 8 MMOL/L (ref 3–16)
BUN SERPL-MCNC: 12 MG/DL (ref 7–20)
BUN SERPL-MCNC: 13 MG/DL (ref 7–20)
CALCIUM SERPL-MCNC: 9 MG/DL (ref 8.3–10.6)
CALCIUM SERPL-MCNC: 9.3 MG/DL (ref 8.3–10.6)
CHLORIDE SERPL-SCNC: 97 MMOL/L (ref 99–110)
CHLORIDE SERPL-SCNC: 98 MMOL/L (ref 99–110)
CO2 SERPL-SCNC: 21 MMOL/L (ref 21–32)
CO2 SERPL-SCNC: 26 MMOL/L (ref 21–32)
CREAT SERPL-MCNC: 1 MG/DL (ref 0.8–1.3)
CREAT SERPL-MCNC: 1 MG/DL (ref 0.8–1.3)
GFR SERPLBLD CREATININE-BSD FMLA CKD-EPI: 76 ML/MIN/{1.73_M2}
GFR SERPLBLD CREATININE-BSD FMLA CKD-EPI: 76 ML/MIN/{1.73_M2}
GLUCOSE SERPL-MCNC: 112 MG/DL (ref 70–99)
GLUCOSE SERPL-MCNC: 98 MG/DL (ref 70–99)
IMMATURE RETIC FRACT: 0.43 (ref 0.21–0.37)
INR PPP: 4.15 (ref 0.85–1.15)
MAGNESIUM SERPL-MCNC: 1.72 MG/DL (ref 1.8–2.4)
MAGNESIUM SERPL-MCNC: 1.83 MG/DL (ref 1.8–2.4)
POTASSIUM SERPL-SCNC: 3.5 MMOL/L (ref 3.5–5.1)
POTASSIUM SERPL-SCNC: 4.3 MMOL/L (ref 3.5–5.1)
PROTHROMBIN TIME: 39.7 SEC (ref 11.9–14.9)
RETICS # AUTO: 0.04 M/UL
RETICS/RBC NFR AUTO: 1.34 % (ref 0.5–2.18)
SODIUM SERPL-SCNC: 131 MMOL/L (ref 136–145)
SODIUM SERPL-SCNC: 131 MMOL/L (ref 136–145)
SODIUM SERPL-SCNC: 132 MMOL/L (ref 136–145)
SODIUM SERPL-SCNC: 133 MMOL/L (ref 136–145)

## 2025-03-17 PROCEDURE — 97535 SELF CARE MNGMENT TRAINING: CPT

## 2025-03-17 PROCEDURE — 97530 THERAPEUTIC ACTIVITIES: CPT

## 2025-03-17 PROCEDURE — 6360000002 HC RX W HCPCS: Performed by: RADIOLOGY

## 2025-03-17 PROCEDURE — 36415 COLL VENOUS BLD VENIPUNCTURE: CPT

## 2025-03-17 PROCEDURE — 07DR3ZX EXTRACTION OF ILIAC BONE MARROW, PERCUTANEOUS APPROACH, DIAGNOSTIC: ICD-10-PCS | Performed by: HOSPITALIST

## 2025-03-17 PROCEDURE — 2060000000 HC ICU INTERMEDIATE R&B

## 2025-03-17 PROCEDURE — 6370000000 HC RX 637 (ALT 250 FOR IP): Performed by: INTERNAL MEDICINE

## 2025-03-17 PROCEDURE — 88360 TUMOR IMMUNOHISTOCHEM/MANUAL: CPT

## 2025-03-17 PROCEDURE — 83735 ASSAY OF MAGNESIUM: CPT

## 2025-03-17 PROCEDURE — 97165 OT EVAL LOW COMPLEX 30 MIN: CPT

## 2025-03-17 PROCEDURE — 88311 DECALCIFY TISSUE: CPT

## 2025-03-17 PROCEDURE — 94760 N-INVAS EAR/PLS OXIMETRY 1: CPT

## 2025-03-17 PROCEDURE — 85045 AUTOMATED RETICULOCYTE COUNT: CPT

## 2025-03-17 PROCEDURE — 2500000003 HC RX 250 WO HCPCS: Performed by: HOSPITALIST

## 2025-03-17 PROCEDURE — 97161 PT EVAL LOW COMPLEX 20 MIN: CPT

## 2025-03-17 PROCEDURE — 84295 ASSAY OF SERUM SODIUM: CPT

## 2025-03-17 PROCEDURE — 88184 FLOWCYTOMETRY/ TC 1 MARKER: CPT

## 2025-03-17 PROCEDURE — 85025 COMPLETE CBC W/AUTO DIFF WBC: CPT

## 2025-03-17 PROCEDURE — 80048 BASIC METABOLIC PNL TOTAL CA: CPT

## 2025-03-17 PROCEDURE — 6370000000 HC RX 637 (ALT 250 FOR IP): Performed by: HOSPITALIST

## 2025-03-17 PROCEDURE — 85610 PROTHROMBIN TIME: CPT

## 2025-03-17 PROCEDURE — 88313 SPECIAL STAINS GROUP 2: CPT

## 2025-03-17 PROCEDURE — 88185 FLOWCYTOMETRY/TC ADD-ON: CPT

## 2025-03-17 PROCEDURE — 88305 TISSUE EXAM BY PATHOLOGIST: CPT

## 2025-03-17 PROCEDURE — C1713 ANCHOR/SCREW BN/BN,TIS/BN: HCPCS

## 2025-03-17 RX ORDER — FENTANYL CITRATE 50 UG/ML
INJECTION, SOLUTION INTRAMUSCULAR; INTRAVENOUS PRN
Status: COMPLETED | OUTPATIENT
Start: 2025-03-17 | End: 2025-03-17

## 2025-03-17 RX ORDER — MIDAZOLAM HYDROCHLORIDE 1 MG/ML
INJECTION, SOLUTION INTRAMUSCULAR; INTRAVENOUS PRN
Status: COMPLETED | OUTPATIENT
Start: 2025-03-17 | End: 2025-03-17

## 2025-03-17 RX ADMIN — Medication 1 G: at 17:10

## 2025-03-17 RX ADMIN — FENTANYL CITRATE 50 MCG: 50 INJECTION INTRAMUSCULAR; INTRAVENOUS at 10:34

## 2025-03-17 RX ADMIN — SODIUM CHLORIDE, PRESERVATIVE FREE 10 ML: 5 INJECTION INTRAVENOUS at 09:36

## 2025-03-17 RX ADMIN — MIDODRINE HYDROCHLORIDE 5 MG: 5 TABLET ORAL at 09:34

## 2025-03-17 RX ADMIN — RIVAROXABAN 20 MG: 20 TABLET, FILM COATED ORAL at 09:34

## 2025-03-17 RX ADMIN — SODIUM CHLORIDE, PRESERVATIVE FREE 10 ML: 5 INJECTION INTRAVENOUS at 19:55

## 2025-03-17 RX ADMIN — Medication 3 MG: at 22:42

## 2025-03-17 RX ADMIN — FENTANYL CITRATE 50 MCG: 50 INJECTION INTRAMUSCULAR; INTRAVENOUS at 10:30

## 2025-03-17 RX ADMIN — HYDROXYUREA 1000 MG: 500 CAPSULE ORAL at 09:34

## 2025-03-17 RX ADMIN — Medication 1 G: at 14:01

## 2025-03-17 RX ADMIN — MIDAZOLAM 2 MG: 1 INJECTION INTRAMUSCULAR; INTRAVENOUS at 10:30

## 2025-03-17 RX ADMIN — ATORVASTATIN CALCIUM 80 MG: 80 TABLET, FILM COATED ORAL at 19:55

## 2025-03-17 ASSESSMENT — PAIN SCALES - GENERAL
PAINLEVEL_OUTOF10: 0

## 2025-03-17 NOTE — PLAN OF CARE
Problem: Chronic Conditions and Co-morbidities  Goal: Patient's chronic conditions and co-morbidity symptoms are monitored and maintained or improved  Outcome: Progressing  Flowsheets (Taken 3/16/2025 2000)  Care Plan - Patient's Chronic Conditions and Co-Morbidity Symptoms are Monitored and Maintained or Improved:   Monitor and assess patient's chronic conditions and comorbid symptoms for stability, deterioration, or improvement   Collaborate with multidisciplinary team to address chronic and comorbid conditions and prevent exacerbation or deterioration   Update acute care plan with appropriate goals if chronic or comorbid symptoms are exacerbated and prevent overall improvement and discharge     Problem: Discharge Planning  Goal: Discharge to home or other facility with appropriate resources  Outcome: Progressing  Flowsheets (Taken 3/16/2025 2000)  Discharge to home or other facility with appropriate resources:   Identify barriers to discharge with patient and caregiver   Arrange for needed discharge resources and transportation as appropriate   Identify discharge learning needs (meds, wound care, etc)   Arrange for interpreters to assist at discharge as needed   Refer to discharge planning if patient needs post-hospital services based on physician order or complex needs related to functional status, cognitive ability or social support system     Problem: Pain  Goal: Verbalizes/displays adequate comfort level or baseline comfort level  Outcome: Progressing  Flowsheets (Taken 3/16/2025 2027)  Verbalizes/displays adequate comfort level or baseline comfort level:   Encourage patient to monitor pain and request assistance   Assess pain using appropriate pain scale   Administer analgesics based on type and severity of pain and evaluate response   Implement non-pharmacological measures as appropriate and evaluate response   Consider cultural and social influences on pain and pain management   Notify Licensed

## 2025-03-17 NOTE — PROGRESS NOTES
Name:  Maxx Norman /Age/Sex: 1945  (79 y.o. male)   MRN & CSN:  [id number] & [id number] Encounter Date/Time: 3/17/2025 12:56 PM EDT   Location:  [id number] PCP: Michelle Humphries MD     Attending:Jonah Newman MD       Maxx Norman is a 79 y.o. male with  has a past medical history of Atrial fibrillation (HCC), Basal cell carcinoma, Bilateral tinnitus, Bradycardia, CAD (coronary artery disease), GERD (gastroesophageal reflux disease), Hyperlipidemia, Hypertension, and Osteoarthritis. 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    CML   BCR-ABL positive   On hydroxyurea   Hem onc following   S/p bone marrow biopsy    CT chest negative for lung mass   Plan for outpatient tyrosine kinase inhibitor    Objective:       Intake/Output Summary (Last 24 hours) at 3/17/2025 1247  Last data filed at 3/17/2025 0700  Gross per 24 hour   Intake 574.5 ml   Output 1005 ml   Net -430.5 ml      Vitals:   Vitals:    25 1028 25 1033 25 1038 25 1044   BP: (!) 156/62 (!) 144/64 136/63 134/65   Pulse: 65 60 60 62   Resp: 20 20 20 20   Temp:       TempSrc:       SpO2: 100% 100% 100% 100%   Weight:       Height:           Interval history:     Seen and examined at bedside after BMB. No acute events overnight. Denies any complaints    Physical Exam:        General: Hard of hearing  Eyes: EOMI  ENT: neck supple  Cardiovascular: Regular

## 2025-03-17 NOTE — PRE SEDATION
Sedation Pre-Procedure Note    Patient Name: Maxx Norman   YOB: 1945  Room/Bed: V6Y-8074/2105-01  Medical Record Number: [id number]  Date: 3/17/2025   Time: 10:28 AM       Indication:  CML    Consent: I have discussed with the patient and/or the patient representative the indication, alternatives, and the possible risks and/or complications of the planned procedure and the anesthesia methods. The patient and/or patient representative appear to understand and agree to proceed.    Vital Signs:   Vitals:    03/17/25 1028   BP: (!) 156/62   Pulse: 65   Resp: 20   Temp:    SpO2: 100%       Past Medical History:   has a past medical history of Atrial fibrillation (HCC), Basal cell carcinoma, Bilateral tinnitus, Bradycardia, CAD (coronary artery disease), GERD (gastroesophageal reflux disease), Hyperlipidemia, Hypertension, and Osteoarthritis.    Past Surgical History:   has a past surgical history that includes Coronary angioplasty with stent (2008); External ear surgery (Right); Skin cancer destruction (03/2017); A-V cardiac pacemaker insertion; and ep device procedure (N/A, 7/22/2024).    Medications:   Scheduled Meds:    sodium chloride  1 g Oral TID WC    hydroxyurea  1,000 mg Oral Daily    midodrine  5 mg Oral TID WC    sodium chloride flush  5-40 mL IntraVENous 2 times per day    rivaroxaban  20 mg Oral Daily    atorvastatin  80 mg Oral Nightly    [Held by provider] tamsulosin  0.4 mg Oral Daily     Continuous Infusions:    sodium chloride       PRN Meds: sodium chloride flush, sodium chloride, potassium chloride **OR** potassium alternative oral replacement **OR** potassium chloride, magnesium sulfate, ondansetron **OR** ondansetron, polyethylene glycol, acetaminophen **OR** acetaminophen, haloperidol lactate  Home Meds:   Prior to Admission medications    Medication Sig Start Date End Date Taking? Authorizing Provider   atorvastatin (LIPITOR) 40 MG tablet TAKE 1 TABLET BY MOUTH AT NIGHT 3/6/25

## 2025-03-17 NOTE — PROGRESS NOTES
NAME:  Maxx Norman  YOB: 1945  MEDICAL RECORD NUMBER:  [id number]    Shift Summary: Patient remains alert and intermittently oriented. Sill confused of time and situation intermittently. Linens changed while patient ambulated to bathroom x1 SBA. Patient did have a BM. VSS, afebrile.     Family updated: No    Rhythm: Paced ventricularly, PVCs    Most recent vitals:   Visit Vitals  BP (!) 153/70   Pulse 62   Temp 97.9 °F (36.6 °C) (Oral)   Resp 16   Ht 1.88 m (6' 2\")   Wt 83.1 kg (183 lb 3.2 oz)   SpO2 99%   BMI 23.52 kg/m²           No data found.    No data found.      Respiratory support needed (if any):  - RA    Admission weight Weight - Scale: 84.7 kg (186 lb 11.7 oz) (03/13/25 1228)    Today's weight    Wt Readings from Last 1 Encounters:   03/17/25 83.1 kg (183 lb 3.2 oz)        James need assessed each shift: YES -  - continue james r/t - per MD order  UOP >30ml/hr: YES  Last documented BM (in last 48 hrs):  Patient Vitals for the past 48 hrs:   Last BM (including prior to admit) Stool Occurrence   03/16/25 2300 03/16/25 1                Restraints (in use currently or dc'd in last 12 hrs): No    Order current and documentation up to date? No    Lines/Drains reviewed @ bedside.  Peripheral IV 03/13/25 Posterior;Right Forearm (Active)   Number of days: 3       Peripheral IV 03/15/25 Right Cephalic (Active)   Number of days: 1       Urinary Catheter 03/13/25 James (Active)   Number of days: 3         Drip rates at handoff:    sodium chloride         Lab Data:   CBC:   Recent Labs     03/15/25  0446 03/16/25  0453   WBC 20.1* 23.4*   HGB 11.4* 11.9*   HCT 31.9* 34.3*   MCV 98.0 100.8*   * 953*     BMP:    Recent Labs     03/15/25  0446 03/15/25  0744 03/16/25  0453 03/16/25  0758 03/16/25  1959 03/17/25  0450   *  122*   < > 131*   < > 132* 133*   K 3.6  --  3.9  --   --   --    CO2 22  --  23  --   --   --    BUN 17  --  13  --   --   --    CREATININE 1.1  --  1.1  --   --   --

## 2025-03-17 NOTE — PROGRESS NOTES
NAME:  Maxx Norman  YOB: 1945  MEDICAL RECORD NUMBER:  [id number]    Shift Summary: Had Bone biopsy today, James removed at 1730 has not urinated yet.    Family updated: Yes:     Son at bedside  Rhythm: Normal Sinus Rhythm     Most recent vitals:   Visit Vitals  BP (!) 153/69   Pulse 62   Temp 97.7 °F (36.5 °C) (Oral)   Resp 14   Ht 1.88 m (6' 2\")   Wt 83.1 kg (183 lb 3.2 oz)   SpO2 100%   BMI 23.52 kg/m²           No data found.    No data found.      Respiratory support needed (if any):  - RA    Admission weight Weight - Scale: 84.7 kg (186 lb 11.7 oz) (03/13/25 1228)    Today's weight    Wt Readings from Last 1 Encounters:   03/17/25 83.1 kg (183 lb 3.2 oz)        James need assessed each shift: N/A - no james present  UOP >30ml/hr: NO - Set perfect serve to Dr. Newman  Last documented BM (in last 48 hrs):  Patient Vitals for the past 48 hrs:   Last BM (including prior to admit) Stool Occurrence   03/16/25 2300 03/16/25 1                Restraints (in use currently or dc'd in last 12 hrs): No    Order current and documentation up to date? No    Lines/Drains reviewed @ bedside.  Peripheral IV 03/13/25 Posterior;Right Forearm (Active)   Number of days: 4       Peripheral IV 03/15/25 Right Cephalic (Active)   Number of days: 2       Urinary Catheter 03/13/25 James (Active)   Number of days: 4         Drip rates at handoff:    sodium chloride         Lab Data:   CBC:   Recent Labs     03/16/25  0453 03/17/25  0450   WBC 23.4* 23.3*   HGB 11.9* 11.8*   HCT 34.3* 34.5*   .8* 101.2*   * 967*     BMP:    Recent Labs     03/17/25  0450 03/17/25  1131 03/17/25  1555   * 132* 131*   K 3.5  --  4.3   CO2 21  --  26   BUN 12  --  13   CREATININE 1.0  --  1.0     ABG: No results for input(s): \"PHART\", \"OGN9UDL\", \"PO2ART\" in the last 72 hours.    Any consults during the shift? No    Any signed and held orders to be released?  No        4 Eyes Skin Assessment       The patient is being

## 2025-03-17 NOTE — PROGRESS NOTES
Occupational Therapy    [facility] - Occupational Therapy   Phone: [phone]    Occupational Therapy    [x] Initial Evaluation            [x] Daily Treatment Note         [] Discharge Summary      Patient: Maxx Norman   : 1945   MRN: [id number]   Date of Service:  3/17/2025    [x]co-treatment indicated; patient seen for co-treatment due to: patient safety.    PT addressing: [] Sitting balance/LE WB, [x] Standing balance/LE WB, [x]Transfer training, [] BLE strength/endurance with functional tasks,  [] Other:  OT addressing: [x]ADL's, [] Pericare,  [x]clothing management, [] BU E strength/endurance with functional tasks,  [] UE WB [x] Other: transfers      Staff Mobility Recommendation: RWx1    AM-PAC Score:   Discharge Recommendations: HHOT    Admitting Diagnosis:  Hyponatremia  Ordering Physician: Jonah Newman MD   Current Admission Summary: per ED note, Maxx Norman is a 79 y.o. male who presents with weakness and fatigue.  He also has slurred speech has been present for over a week but got worse over the past 48 hours.  He denies any vertigo.  He denies any focal weakness.  He had abnormalities on his CBC and was seen by hematology oncology which was an elevated white count and abnormal platelets.  He also had abnormal liver functions and scheduled to see a liver doctor.  He has started with hiccups 3 days ago.  They have gotten progressively worse.  He has a history of prolonged hiccups in the past but never this bad and never been treated for it.  His son states that he is unable to perform his activities of daily living.  Normally he can dress to self and feed himself but today he could not get out of bed to get dressed to go to an appointment with Dr. Reyes, cardiologist.  He was recently started on diuretics for swelling of his bilateral lower extremities.   Past Medical History:  has a past medical history of Atrial fibrillation (HCC), Basal cell carcinoma, Bilateral tinnitus,  Bradycardia, CAD (coronary artery disease), GERD (gastroesophageal reflux disease), Hyperlipidemia, Hypertension, and Osteoarthritis.  Past Surgical History:  has a past surgical history that includes Coronary angioplasty with stent (2008); External ear surgery (Right); Skin cancer destruction (03/2017); A-V cardiac pacemaker insertion; and ep device procedure (N/A, 7/22/2024).    Assessment  Activity Tolerance: Good  Impairments Requiring Therapeutic Intervention: decreased functional mobility, decreased ADL status, decreased strength, decreased endurance, decreased balance  Prognosis: good    Clinical Assessment: PTA pt from home where pt was Ind with mobility and ADLs. Pt currently functioning below baseline completing mobility and transfers with CGA with no device and SBA with RW. No overt LOB although unsteady without RW. Anticipate pt needing up to SBA/Min A for ADLs based on ROM, strength, and balance. Pt will benefit from skilled OT services at this time. Anticipate pt able to return home at time of D/C.       DME Required For Discharge: no DME required at discharge    _________________________________________________________________________________________________________________________________________________________________________________________________________________________________________  Restrictions:  Precautions/Restrictions: high fall risk  Weight Bearing Restrictions: no restrictions    Required Braces/Orthotics: no braces required  Positional Restrictions:no positional restrictions    Home Environment / Functional History  Lives With: Spouse  Type of Home: House  Home Layout: Able to Live on Main level with bedroom/bathroom  Home Access: Level entry  Bathroom Shower/Tub: Shower chair with back  Bathroom Toilet: Standard  Bathroom Equipment: Tub transfer bench, Grab bars in shower, Grab bars around toilet  Bathroom Accessibility: Accessible  Home Equipment: Cane, Walker - Rolling  Has the

## 2025-03-17 NOTE — PROGRESS NOTES
Physical Therapy  Facility/Department: [address] ICU  Physical Therapy Initial Assessment    Name: Maxx Norman  : 1945  MRN: [id number]  Date of Service: 3/17/2025    Discharge Recommendations:  Continue to assess pending progress, Home with Home health PT   PT Equipment Recommendations  Other: None anticipated.      Maxx Norman scored a 19/24 on the AM-PAC short mobility form. Current research shows that an AM-PAC score of 18 or greater is typically associated with a discharge to the patient's home setting. Based on the patient's AM-PAC score and their current functional mobility deficits, it is recommended that the patient have 2-3 sessions per week of Physical Therapy at d/c to increase the patient's independence.  At this time, this patient demonstrates the endurance and safety to discharge home with Home PT Evaluation  and a follow up treatment frequency of 2-3x/wk.  Please see assessment section for further patient specific details.    If patient discharges prior to next session this note will serve as a discharge summary.  Please see below for the latest assessment towards goals.   [x]co-treatment indicated; patient seen for co-treatment due to: patient safety.    PT addressing: [] Sitting balance/LE WB, [x] Standing balance/LE WB, [x]Transfer training, [x] BLE strength/endurance with functional tasks,  [] Other:  OT addressing: [x]ADL's, [] Pericare,  [x]clothing management, [] BU E strength/endurance with functional tasks,  [] UE WB [] Other:       Assessment  Body Structures, Functions, Activity Limitations Requiring Skilled Therapeutic Intervention: Decreased functional mobility ;Decreased strength;Decreased endurance;Decreased balance  Assessment: 80 y/o male admit 3/13/2025 with Hyponatremia, Slurred Speech, Generalized Weakness. CT Head : negative. PMH as noted including CAD, A-Fib, SSS, Pacemaker, CHF, Occipital Infarction, Vertigo, Bilat Tinnitus.  PTA pt living with family in house  needed moving from lying on your back to sitting on the side of a flat bed without using bedrails?: A Little  How much help is needed moving to and from a bed to a chair?: A Little  How much help is needed standing up from a chair using your arms?: A Little  How much help is needed walking in hospital room?: A Little  How much help is needed climbing 3-5 steps with a railing?: A Little  AM-[facility] Inpatient Mobility Raw Score : 19  AM-PAC Inpatient T-Scale Score : 45.44  Mobility Inpatient CMS 0-100% Score: 41.77  Mobility Inpatient CMS G-Code Modifier : CK           Goals  Short Term Goals  Time Frame for Short Term Goals: Upon d/c acute care setting.  Short Term Goal 1: Bed Mob Independent.  Short Term Goal 2: Transfers with/without assist device Supervision.  Short Term Goal 3: Amb with/without assist device 150' Supervision.  Patient Goals   Patient Goals : Return home.       Education  Patient Education  Education Given To: Patient  Education Provided Comments: Role of PT, POC, Need to call for assist, Safe use of Walker.  Education Method: Verbal  Barriers to Learning: Hearing  Education Outcome: Verbalized understanding;Continued education needed      Therapy Time   Individual Concurrent Group Co-treatment   Time In       0734   Time Out       0758   Minutes       24           Roseline Segovia, PT

## 2025-03-17 NOTE — PROGRESS NOTES
Nephrology Progress Note   [email or web address]      Chief Complaint: Altered mental status    History of Present Illness: Patient is lethargic - history obtained from patients son and chart    Mr Norman is an 79 male with a past medical history significant for Hypertension, Hyperlipidemia, CML, CAD (stent LAD in ), A Fib, bradycardia s/p PPM and recent hospitalization at [facility] from 3/4/25 to 3/6/25 with volume overload that presented to the ED with altered mental status    Son stated he went to  his father to take to his Cardiology appointment and realized his father was not dressed appeared confused and could not follow instructions and that's when he decided to get to the ED. Upon arrival blood work revealed a serum sodium of 106 meq/L. He was also noted to be retaining urine so a james was inserted    Patient is making quite a bit of urine. He remains confused thrashing in bed. Nephrology consulted for critical life threatening Hyponatremia    Subjective:    Feels better , labs reviewed     ROS: no chest pain. No shortness of breath    Scheduled Meds:   sodium chloride  1 g Oral TID WC    hydroxyurea  1,000 mg Oral Daily    midodrine  5 mg Oral TID WC    sodium chloride flush  5-40 mL IntraVENous 2 times per day    rivaroxaban  20 mg Oral Daily    atorvastatin  80 mg Oral Nightly    [Held by provider] tamsulosin  0.4 mg Oral Daily        sodium chloride         PRN Meds:.sodium chloride flush, sodium chloride, potassium chloride **OR** potassium alternative oral replacement **OR** potassium chloride, magnesium sulfate, ondansetron **OR** ondansetron, polyethylene glycol, acetaminophen **OR** acetaminophen, haloperidol lactate    Physical Exam:    TEMPERATURE:  Current - Temp: 97.9 °F (36.6 °C); Max - Temp  Av °F (36.7 °C)  Min: 97.9 °F (36.6 °C)  Max: 98.2 °F (36.8 °C)  RESPIRATIONS RANGE: Resp  Av.5  Min: 12  Max: 26  PULSE RANGE: Pulse  Av.3  Min: 60  Max: 87  BLOOD PRESSURE RANGE:   Systolic (24hrs), Av , Min:128 , Max:157   ; Diastolic (24hrs), Av, Min:60, Max:71    24HR INTAKE/OUTPUT:    Intake/Output Summary (Last 24 hours) at 3/17/2025 1146  Last data filed at 3/17/2025 0700  Gross per 24 hour   Intake 694.5 ml   Output 1005 ml   Net -310.5 ml       Patient Vitals for the past 96 hrs (Last 3 readings):   Weight   25 0413 83.1 kg (183 lb 3.2 oz)   25 0440 82.4 kg (181 lb 10.5 oz)   03/15/25 0624 85.2 kg (187 lb 13.3 oz)       General: Awake, alert  HEENT: Normocephalic, atraumatic  Chest: CTA  CVS: RRR, no rub  Abdomen: soft, non distended  Extremities: no edema  Skin: normal skin turgor, no rash  Musculoskeletal: no joint swelling, no visible deformity  Neurological: moving all four extremities    : james in place; no hematuria noted    Allergies:  Allergies   Allergen Reactions    Bee Venom Anaphylaxis      LAB DATA:    CBC:   Lab Results   Component Value Date/Time    WBC 23.3 2025 04:50 AM    RBC 3.41 2025 04:50 AM    HGB 11.8 2025 04:50 AM    HCT 34.5 2025 04:50 AM    .2 2025 04:50 AM    MCH 34.6 2025 04:50 AM    MCHC 34.2 2025 04:50 AM    RDW 16.0 2025 04:50 AM     2025 04:50 AM    MPV 8.3 2025 04:50 AM     BMP:    Lab Results   Component Value Date/Time     2025 04:50 AM    K 3.5 2025 04:50 AM    CL 98 2025 04:50 AM    CO2 21 2025 04:50 AM    BUN 12 2025 04:50 AM    CREATININE 1.0 2025 04:50 AM    CALCIUM 9.0 2025 04:50 AM    GFRAA >60 2022 05:37 AM    GFRAA >60 2011 09:16 AM    LABGLOM 76 2025 04:50 AM    LABGLOM 56 2023 08:53 AM    GLUCOSE 98 2025 04:50 AM     Ionized Calcium:  No components found for: \"IONCA\"  Magnesium:    Lab Results   Component Value Date/Time    MG 1.72 2025 04:50 AM     Phosphorus:    Lab Results   Component Value Date/Time    PHOS 2.4 2025 04:53 AM     U/A:    Lab Results

## 2025-03-17 NOTE — PROGRESS NOTES
ONCOLOGY HEMATOLOGY CARE PROGRESS NOTE      SUBJECTIVE:  Pt was sleepy     ROS: defer OBJECTIVE        Physical    VITALS:  Patient Vitals for the past 24 hrs:   BP Temp Temp src Pulse Resp SpO2 Weight   03/17/25 0735 -- -- -- 67 13 98 % --   03/17/25 0700 -- -- -- 60 13 98 % --   03/17/25 0600 -- -- -- 62 16 99 % --   03/17/25 0500 -- -- -- 69 19 98 % --   03/17/25 0413 -- -- -- -- -- -- 83.1 kg (183 lb 3.2 oz)   03/17/25 0400 (!) 153/70 97.9 °F (36.6 °C) Oral 62 14 96 % --   03/17/25 0300 -- -- -- 65 18 96 % --   03/17/25 0200 -- -- -- 62 14 97 % --   03/17/25 0100 -- -- -- 63 26 97 % --   03/17/25 0000 (!) 153/62 98.2 °F (36.8 °C) Oral 70 15 98 % --   03/16/25 2300 -- -- -- 60 17 98 % --   03/16/25 2200 -- -- -- 60 16 97 % --   03/16/25 2100 -- -- -- 60 16 99 % --   03/16/25 2027 (!) 157/71 98 °F (36.7 °C) Oral 73 20 98 % --   03/16/25 2000 -- -- -- 70 17 100 % --   03/16/25 1923 -- -- -- 68 17 100 % --   03/16/25 1900 -- -- -- 87 14 100 % --   03/16/25 1800 -- -- -- 65 19 99 % --   03/16/25 1717 (!) 151/60 -- -- 68 17 -- --   03/16/25 1716 (!) 151/60 -- -- 78 16 100 % --   03/16/25 1700 -- -- -- 64 14 98 % --   03/16/25 1619 -- -- -- 61 12 100 % --   03/16/25 1600 (!) 144/62 98 °F (36.7 °C) Oral 70 17 100 % --   03/16/25 1554 -- -- -- 75 16 100 % --   03/16/25 1500 -- -- -- 60 14 100 % --   03/16/25 1400 -- -- -- 60 15 100 % --   03/16/25 1331 -- -- -- 62 16 100 % --   03/16/25 1300 135/66 -- -- 60 15 100 % --   03/16/25 1200 128/60 -- -- 61 13 97 % --   03/16/25 1143 -- 98.2 °F (36.8 °C) Oral 63 14 99 % --   03/16/25 1100 (!) 126/55 -- -- 60 14 97 % --   03/16/25 1000 (!) 134/46 -- -- 60 17 97 % --   03/16/25 0900 (!) 136/51 -- -- 60 15 99 % --   03/16/25 0800 (!) 141/66 -- -- 80 18 100 % --       24HR INTAKE/OUTPUT:    Intake/Output Summary (Last 24 hours) at 3/17/2025 0741  Last data filed at 3/17/2025 0700  Gross per 24 hour   Intake 1471.23 ml   Output 1255 ml   Net

## 2025-03-18 LAB
A1AT SERPL-MCNC: 168 MG/DL (ref 90–200)
AFP-TM SERPL-MCNC: 2.5 UG/L
ALBUMIN SERPL-MCNC: 3.6 G/DL (ref 3.4–5)
ANION GAP SERPL CALCULATED.3IONS-SCNC: 10 MMOL/L (ref 3–16)
ANION GAP SERPL CALCULATED.3IONS-SCNC: 11 MMOL/L (ref 3–16)
BACTERIA URNS QL MICRO: NORMAL /HPF
BILIRUB UR QL STRIP.AUTO: NEGATIVE
BUN SERPL-MCNC: 14 MG/DL (ref 7–20)
BUN SERPL-MCNC: 16 MG/DL (ref 7–20)
CALCIUM SERPL-MCNC: 9.3 MG/DL (ref 8.3–10.6)
CALCIUM SERPL-MCNC: 9.6 MG/DL (ref 8.3–10.6)
CERULOPLASMIN SERPL-MCNC: 25 MG/DL (ref 15–30)
CHLORIDE SERPL-SCNC: 96 MMOL/L (ref 99–110)
CHLORIDE SERPL-SCNC: 96 MMOL/L (ref 99–110)
CHLORIDE UR-SCNC: 66 MMOL/L
CLARITY UR: CLEAR
CO2 SERPL-SCNC: 23 MMOL/L (ref 21–32)
CO2 SERPL-SCNC: 24 MMOL/L (ref 21–32)
COLOR UR: YELLOW
CORTIS AM PEAK SERPL-MCNC: 14 UG/DL (ref 4.3–22.4)
CREAT SERPL-MCNC: 0.9 MG/DL (ref 0.8–1.3)
CREAT SERPL-MCNC: 1.1 MG/DL (ref 0.8–1.3)
EPI CELLS #/AREA URNS AUTO: 2 /HPF (ref 0–5)
GFR SERPLBLD CREATININE-BSD FMLA CKD-EPI: 68 ML/MIN/{1.73_M2}
GFR SERPLBLD CREATININE-BSD FMLA CKD-EPI: 86 ML/MIN/{1.73_M2}
GLUCOSE SERPL-MCNC: 111 MG/DL (ref 70–99)
GLUCOSE SERPL-MCNC: 92 MG/DL (ref 70–99)
GLUCOSE UR STRIP.AUTO-MCNC: NEGATIVE MG/DL
HGB UR QL STRIP.AUTO: NEGATIVE
HYALINE CASTS #/AREA URNS AUTO: 4 /LPF (ref 0–8)
KETONES UR STRIP.AUTO-MCNC: NEGATIVE MG/DL
LEUKOCYTE ESTERASE UR QL STRIP.AUTO: ABNORMAL
NITRITE UR QL STRIP.AUTO: NEGATIVE
OSMOLALITY SERPL: 281 MOSM/KG (ref 280–301)
OSMOLALITY UR: 417 MOSM/KG (ref 390–1070)
PH UR STRIP.AUTO: 6 [PH] (ref 5–8)
PHOSPHATE SERPL-MCNC: 3 MG/DL (ref 2.5–4.9)
POTASSIUM SERPL-SCNC: 3.7 MMOL/L (ref 3.5–5.1)
POTASSIUM SERPL-SCNC: 4.2 MMOL/L (ref 3.5–5.1)
POTASSIUM UR-SCNC: 26.1 MMOL/L
PROT UR STRIP.AUTO-MCNC: ABNORMAL MG/DL
RBC CLUMPS #/AREA URNS AUTO: 1 /HPF (ref 0–4)
SODIUM SERPL-SCNC: 129 MMOL/L (ref 136–145)
SODIUM SERPL-SCNC: 129 MMOL/L (ref 136–145)
SODIUM SERPL-SCNC: 131 MMOL/L (ref 136–145)
SODIUM UR-SCNC: 60 MMOL/L
SP GR UR STRIP.AUTO: 1.01 (ref 1–1.03)
TSH SERPL DL<=0.005 MIU/L-ACNC: 0.57 UIU/ML (ref 0.27–4.2)
UA DIPSTICK W REFLEX MICRO PNL UR: YES
URN SPEC COLLECT METH UR: ABNORMAL
UROBILINOGEN UR STRIP-ACNC: 4 E.U./DL
WBC #/AREA URNS AUTO: 4 /HPF (ref 0–5)

## 2025-03-18 PROCEDURE — 94760 N-INVAS EAR/PLS OXIMETRY 1: CPT

## 2025-03-18 PROCEDURE — 97530 THERAPEUTIC ACTIVITIES: CPT

## 2025-03-18 PROCEDURE — 6370000000 HC RX 637 (ALT 250 FOR IP): Performed by: HOSPITALIST

## 2025-03-18 PROCEDURE — 84443 ASSAY THYROID STIM HORMONE: CPT

## 2025-03-18 PROCEDURE — 97535 SELF CARE MNGMENT TRAINING: CPT

## 2025-03-18 PROCEDURE — 81001 URINALYSIS AUTO W/SCOPE: CPT

## 2025-03-18 PROCEDURE — 80069 RENAL FUNCTION PANEL: CPT

## 2025-03-18 PROCEDURE — 2060000000 HC ICU INTERMEDIATE R&B

## 2025-03-18 PROCEDURE — 83935 ASSAY OF URINE OSMOLALITY: CPT

## 2025-03-18 PROCEDURE — 2500000003 HC RX 250 WO HCPCS: Performed by: HOSPITALIST

## 2025-03-18 PROCEDURE — 84133 ASSAY OF URINE POTASSIUM: CPT

## 2025-03-18 PROCEDURE — 84295 ASSAY OF SERUM SODIUM: CPT

## 2025-03-18 PROCEDURE — 83930 ASSAY OF BLOOD OSMOLALITY: CPT

## 2025-03-18 PROCEDURE — 6370000000 HC RX 637 (ALT 250 FOR IP): Performed by: INTERNAL MEDICINE

## 2025-03-18 PROCEDURE — 82436 ASSAY OF URINE CHLORIDE: CPT

## 2025-03-18 PROCEDURE — 82533 TOTAL CORTISOL: CPT

## 2025-03-18 PROCEDURE — 97116 GAIT TRAINING THERAPY: CPT

## 2025-03-18 PROCEDURE — 84300 ASSAY OF URINE SODIUM: CPT

## 2025-03-18 PROCEDURE — 36415 COLL VENOUS BLD VENIPUNCTURE: CPT

## 2025-03-18 RX ADMIN — Medication 3 MG: at 20:22

## 2025-03-18 RX ADMIN — Medication 1 G: at 10:02

## 2025-03-18 RX ADMIN — SODIUM CHLORIDE, PRESERVATIVE FREE 10 ML: 5 INJECTION INTRAVENOUS at 10:01

## 2025-03-18 RX ADMIN — MIDODRINE HYDROCHLORIDE 5 MG: 5 TABLET ORAL at 12:00

## 2025-03-18 RX ADMIN — RIVAROXABAN 20 MG: 20 TABLET, FILM COATED ORAL at 10:02

## 2025-03-18 RX ADMIN — Medication 1 G: at 12:00

## 2025-03-18 RX ADMIN — ATORVASTATIN CALCIUM 80 MG: 80 TABLET, FILM COATED ORAL at 20:22

## 2025-03-18 RX ADMIN — MIDODRINE HYDROCHLORIDE 5 MG: 5 TABLET ORAL at 10:02

## 2025-03-18 RX ADMIN — SODIUM CHLORIDE, PRESERVATIVE FREE 10 ML: 5 INJECTION INTRAVENOUS at 20:22

## 2025-03-18 RX ADMIN — MIDODRINE HYDROCHLORIDE 5 MG: 5 TABLET ORAL at 16:55

## 2025-03-18 RX ADMIN — HYDROXYUREA 1000 MG: 500 CAPSULE ORAL at 10:01

## 2025-03-18 RX ADMIN — Medication 1 G: at 16:55

## 2025-03-18 ASSESSMENT — PAIN SCALES - GENERAL
PAINLEVEL_OUTOF10: 0

## 2025-03-18 NOTE — PROGRESS NOTES
transfer bench, Grab bars in shower, Grab bars around toilet  Bathroom Accessibility: Accessible  Home Equipment: Cane, Walker - Rolling (is wife's)  Has the patient had two or more falls in the past year or any fall with injury in the past year?: Yes  Prior Level of Assist for ADLs: Independent  Prior Level of Assist for Homemaking: Needs assistance (Son and granddtr help with groceries, cleaning, meals)  Homemaking Responsibilities: Yes  Prior Level of Assist for Ambulation: Independent household ambulator, with or without device, Independent community ambulator, with or without device (Without assist device.)  Prior Level of Assist for Transfers: Independent  Active : No  Patient's  Info: Son  Occupation: Retired  Type of Occupation: , 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 No reports of pain. Pt requesting to shave.  Pain: 0/10  Pain Interventions: not applicable        Activities of Daily Living  Basic Activities of Daily Living  Grooming: supervision  Grooming Comments: seated from chair at sink to shave, wash face, brush teeth; declined standing  Lower Extremity Dressing: supervision  Dressing Comments: seated to don bilateral socks, don hospital pants   General Comments: Anticipate pt needing up to SBA for ADLs based on ROM, strength, and balance  Instrumental Activities of Daily Living  No IADL completed on this date.    Functional Mobility  Bed Mobility:  Supine to Sit: supervision  Scooting: supervision  Comments:HOB raised  Transfers:  Sit to stand transfer:stand by assistance  Stand to sit transfer: stand by assistance  Bed / Chair transfer: stand by assistance.    Bed / Chair equipment: rolling walker  Bed / Chair comments: RW; cues for hand placement  Comments:  Functional Mobility  Functional Mobility Activity: to/from bathroom, 10ft bathroom; 25 ft from Br to recliner.  Device Use: rolling  walker  Required Assistance: stand by assistance  Comment: SBA with RW    Cognition  WFL  Orientation:    alert and oriented x 4  Command Following:   WFL     Education  Barriers To Learning: hearing  Patient Education: patient educated on goals, OT role and benefits, plan of care, ADL adaptive strategies, transfer training  Learning Assessment:  patient verbalizes and demonstrates understanding  Safety Interventions: patient left in chair, chair alarm in place, call light within reach, gait belt, and nurse notified    Plan  Frequency: 3-5 x/week  Current Treatment Recommendations: strengthening, balance training, functional mobility training, transfer training, endurance training, patient/caregiver education, and ADL/self-care training    Goals  Patient Goals: return home   Short Term Goals:  Time Frame: prior to D/C  Patient will complete upper body ADL at modified independent   Patient will complete lower body ADL at modified independent   Patient will complete toileting at modified independent   Patient will complete grooming at modified independent   Patient will complete functional transfers at modified independent   Patient will complete functional mobility at modified independent     Above goals reviewed on 3/18/2025.  All goals are ongoing at this time unless indicated above.       Therapy Session Time    Therapy Time     Individual    Time In 0917    Time Out 0956    Minutes 39        Khushboo TRAYLOR/L,[id number]    OTR was consulted with this patients treatment/intervention plan.

## 2025-03-18 NOTE — PLAN OF CARE
Problem: Chronic Conditions and Co-morbidities  Goal: Patient's chronic conditions and co-morbidity symptoms are monitored and maintained or improved  Outcome: Progressing     Problem: Discharge Planning  Goal: Discharge to home or other facility with appropriate resources  Outcome: Progressing     Problem: Pain  Goal: Verbalizes/displays adequate comfort level or baseline comfort level  3/18/2025 1118 by Fior Valente RN  Outcome: Progressing     Problem: Safety - Adult  Goal: Free from fall injury  3/18/2025 1118 by Fior Valente RN  Outcome: Progressing     Problem: Skin/Tissue Integrity  Goal: Skin integrity remains intact  Description: 1.  Monitor for areas of redness and/or skin breakdown  2.  Assess vascular access sites hourly  3.  Every 4-6 hours minimum:  Change oxygen saturation probe site  4.  Every 4-6 hours:  If on nasal continuous positive airway pressure, respiratory therapy assess nares and determine need for appliance change or resting period  Outcome: Progressing     Problem: Neurosensory - Adult  Goal: Achieves stable or improved neurological status  3/18/2025 1118 by Fior Valente RN  Outcome: Progressing     Problem: Neurosensory - Adult  Goal: Achieves maximal functionality and self care  Outcome: Progressing     Problem: Respiratory - Adult  Goal: Achieves optimal ventilation and oxygenation  Outcome: Progressing     Problem: Cardiovascular - Adult  Goal: Maintains optimal cardiac output and hemodynamic stability  Outcome: Progressing     Problem: Cardiovascular - Adult  Goal: Absence of cardiac dysrhythmias or at baseline  Outcome: Progressing     Problem: Skin/Tissue Integrity - Adult  Goal: Skin integrity remains intact  Description: 1.  Monitor for areas of redness and/or skin breakdown  2.  Assess vascular access sites hourly  3.  Every 4-6 hours minimum:  Change oxygen saturation probe site  4.  Every 4-6 hours:  If on nasal continuous positive airway pressure, respiratory  therapy assess nares and determine need for appliance change or resting period  Outcome: Progressing     Problem: Skin/Tissue Integrity - Adult  Goal: Oral mucous membranes remain intact  Outcome: Progressing     Problem: Musculoskeletal - Adult  Goal: Return mobility to safest level of function  Outcome: Progressing     Problem: Musculoskeletal - Adult  Goal: Return ADL status to a safe level of function  Outcome: Progressing     Problem: Gastrointestinal - Adult  Goal: Minimal or absence of nausea and vomiting  Outcome: Progressing     Problem: Gastrointestinal - Adult  Goal: Maintains or returns to baseline bowel function  Outcome: Progressing     Problem: Genitourinary - Adult  Goal: Absence of urinary retention  Outcome: Progressing     Problem: Infection - Adult  Goal: Absence of infection at discharge  Outcome: Progressing     Problem: Metabolic/Fluid and Electrolytes - Adult  Goal: Electrolytes maintained within normal limits  Outcome: Progressing     Problem: Hematologic - Adult  Goal: Maintains hematologic stability  Outcome: Progressing

## 2025-03-18 NOTE — PLAN OF CARE
Problem: Pain  Goal: Verbalizes/displays adequate comfort level or baseline comfort level  Outcome: Progressing  Flowsheets (Taken 3/18/2025 0655)  Verbalizes/displays adequate comfort level or baseline comfort level:   Encourage patient to monitor pain and request assistance   Assess pain using appropriate pain scale     Problem: Safety - Adult  Goal: Free from fall injury  Outcome: Progressing  Flowsheets (Taken 3/18/2025 0655)  Free From Fall Injury: Instruct family/caregiver on patient safety     Problem: Neurosensory - Adult  Goal: Achieves stable or improved neurological status  Outcome: Progressing  Flowsheets (Taken 3/18/2025 0655)  Achieves stable or improved neurological status:   Assess for and report changes in neurological status   Monitor temperature, glucose, and sodium. Initiate appropriate interventions as ordered

## 2025-03-18 NOTE — FLOWSHEET NOTE
03/18/25 1800   Treatment Team Notification   Reason for Communication   (PATIENT'S SON, DOLORES, AT BEDSIDE REVIEWING DNRCC PAPERWORK PROVIDED BY PALLIATIVE CARE TODAY. PER SON, PATIENT AND FAMILY WANT DNRCCA. LEFT MESSAGE WITH CHASIDY WITH PALLIATIVE CARE.)   Name of Team Member Notified DR. LINDA EGAN   Treatment Team Role Attending Provider   Method of Communication Secure Message   Response Waiting for response   Notification Time 1808     03/18/2025 @ 1820: Dr. Egan called this RN and ordered to shred DNRCC paperwork and place DNRCC-A paperwork in patient chart and he will sign tomorrow. Dr. Egan changed order to reflect DNRCC-A. Paperwork placed in patient chart for MD signature. See orders. Electronically signed by Fior Valente RN on 3/18/2025 at 6:25 PM

## 2025-03-18 NOTE — PROGRESS NOTES
ONCOLOGY HEMATOLOGY CARE PROGRESS NOTE      SUBJECTIVE: feeling better, confusion improving.      ROS:     Constitutional:  No weight loss, No fever, No chills, No night sweats.  Energy level good.  Eyes:  No impairment or change in vision  ENT / Mouth:  No pain, abnormal ulceration, bleeding, nasal drip or change in voice or hearing  Cardiovascular:  No chest pain, palpitations, new edema, or calf discomfort  Respiratory:  No pain, hemoptysis, change to breathing  Breast:  No pain, discharge, change in appearance or texture  Gastrointestinal:  No pain, cramping, jaundice, change to eating and bowel habits  Urinary:  No pain, bleeding or change in continence  Genitalia: No pain, bleeding or discharge  Musculoskeletal:  No redness, pain, edema or weakness  Skin:  No pruritus, rash, change to nodules or lesions  Neurologic:  No discomfort, change in mental status, speech, sensory or motor activity  Psychiatric:  No change in concentration or change to affect or mood  Endocrine:  No hot flashes, increased thirst, or change to urine production  Hematologic: No petechiae, ecchymosis or bleeding  Lymphatic:  No lymphadenopathy or lymphedema  Allergy / Immunologic:  No eczema, hives, frequent or recurrent infections    OBJECTIVE        Physical    VITALS:  Patient Vitals for the past 24 hrs:   BP Temp Temp src Pulse Resp SpO2 Weight   03/18/25 0450 115/60 98.1 °F (36.7 °C) Oral 72 18 100 % --   03/18/25 0430 -- -- -- 61 -- -- --   03/18/25 0416 -- -- -- -- -- -- 82 kg (180 lb 12.4 oz)   03/18/25 0020 137/73 97.8 °F (36.6 °C) Oral -- 18 97 % --   03/17/25 2047 -- -- -- 64 -- -- --   03/17/25 2023 (!) 150/67 98.5 °F (36.9 °C) Oral 68 15 100 % --   03/17/25 1559 (!) 153/69 97.7 °F (36.5 °C) Oral 62 14 100 % --   03/17/25 1359 (!) 144/75 -- -- 65 11 100 % --   03/17/25 1044 134/65 -- -- 62 20 100 % --   03/17/25 1038 136/63 -- -- 60 20 100 % --   03/17/25 1033 (!) 144/64 -- -- 60 20 100 %

## 2025-03-18 NOTE — PROGRESS NOTES
V2.0    [facility] [place]      Name:  Maxx Norman /Age/Sex: 1945  (79 y.o. male)   MRN & CSN:  [id number] & [id number] Encounter Date/Time: 3/18/2025 10:59 AM EDT   Location:  [id number] PCP: Michelle Humphries MD     Attending:Rodrigo Egan MD       Hospital Day: 6    Assessment and Recommendations   Maxx Norman is a 79 y.o. male who presents with Hyponatremia      Plan:   Hyponatremia, status post 3% saline during this admission nephrology following on fluid restriction at this time improving  Altered mental status with encephalopathy overall improving likely due to above  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 consulted status post bone marrow biopsy today      Diet ADULT DIET; Regular; 1500 ml   DVT Prophylaxis [] Lovenox, []  Heparin, [] SCDs, [] Ambulation,  [] Eliquis, [] Xarelto  [] Coumadin   Code Status DNR-CC             Personally reviewed Lab Studies and Imaging     Discussed management of the case with nephrology who recommended fluid restriction  Rhythm strip independently interpreted by myself PVCs heart rate 82 no ST elevation      Medical Decision Making:  The following items were considered in medical decision making:  Discussion of patient care with other providers  Reviewed clinical lab tests  Reviewed radiology tests  Reviewed other diagnostic tests/interventions  Independent review of radiologic images  Microbiology cultures and other micro tests reviewed      Subjective:     Chief Complaint: Weakness encephalopathy    Maxx Norman is a 79 y.o. male who presents with weakness encephalopathy still with some confusion no fever chills nausea or vomiting      Review of Systems:      Pertinent positives and negatives discussed in HPI    Objective:     Intake/Output Summary (Last 24 hours) at 3/18/2025 1059  Last data filed at 3/18/2025 0450  Gross per 24 hour   Intake 480 ml   Output  acute abnormality of the visualized skull or soft tissues.     Stable exam without evidence for acute intracranial abnormality.     XR CHEST PORTABLE  Result Date: 3/13/2025  EXAMINATION: ONE XRAY VIEW OF THE CHEST 3/13/2025 12:51 pm COMPARISON: 05/04/2025 HISTORY: ORDERING SYSTEM PROVIDED HISTORY: Slurred speech, fatigued TECHNOLOGIST PROVIDED HISTORY: Reason for exam:->Slurred speech, fatigued Reason for Exam: Slurred speech, fatigued FINDINGS: Lines and tubes: Pacemaker wire remains Stable heart size.  The lungs are clear.  Scattered calcified granulomas stable.     No acute cardiopulmonary process.       CBC:   Recent Labs     03/16/25  0453 03/17/25  0450   WBC 23.4* 23.3*   HGB 11.9* 11.8*   * 967*     BMP:    Recent Labs     03/17/25  0450 03/17/25  1131 03/17/25  1555 03/17/25  1929 03/18/25  0433   *   < > 131* 131* 129*  131*   K 3.5  --  4.3  --  3.7   CL 98*  --  97*  --  96*   CO2 21  --  26  --  23   BUN 12  --  13  --  14   CREATININE 1.0  --  1.0  --  0.9   GLUCOSE 98  --  112*  --  92    < > = values in this interval not displayed.     Hepatic:   Recent Labs     03/16/25 0453   AST 62*   ALT 34   BILITOT 2.1*   ALKPHOS 138*     Lipids:   Lab Results   Component Value Date/Time    CHOL 135 08/01/2023 08:53 AM    HDL 50 08/01/2023 08:53 AM    HDL 47 09/19/2011 09:16 AM    TRIG 98 08/01/2023 08:53 AM     Hemoglobin A1C:   Lab Results   Component Value Date/Time    LABA1C 5.9 08/01/2023 08:53 AM     TSH:   Lab Results   Component Value Date/Time    TSH 2.41 07/27/2016 10:20 AM     Troponin: No results found for: \"TROPONINT\"  Lactic Acid: No results for input(s): \"LACTA\" in the last 72 hours.  BNP: No results for input(s): \"PROBNP\" in the last 72 hours.  UA:  Lab Results   Component Value Date/Time    NITRU Negative 03/13/2025 12:52 PM    COLORU Yellow 03/13/2025 12:52 PM    PHUR 7.5 03/13/2025 12:52 PM    PHUR 7.5 05/26/2022 02:30 AM    CLARITYU Clear 03/13/2025 12:52 PM    SPECGRAV 1.015

## 2025-03-18 NOTE — PROGRESS NOTES
Nephrology Progress Note   [email or web address]      Chief Complaint: Altered mental status    History of Present Illness: Patient is lethargic - history obtained from patients son and chart    Mr Norman is an 79 male with a past medical history significant for Hypertension, Hyperlipidemia, CML, CAD (stent LAD in ), A Fib, bradycardia s/p PPM and recent hospitalization at [facility] from 3/4/25 to 3/6/25 with volume overload that presented to the ED with altered mental status    Son stated he went to  his father to take to his Cardiology appointment and realized his father was not dressed appeared confused and could not follow instructions and that's when he decided to get to the ED. Upon arrival blood work revealed a serum sodium of 106 meq/L. He was also noted to be retaining urine so a james was inserted    Patient is making quite a bit of urine. He remains confused thrashing in bed. Nephrology consulted for critical life threatening Hyponatremia    Subjective:    Feels better , labs reviewed   Tx out of ICU   ROS: no chest pain. No shortness of breath    Scheduled Meds:   sodium chloride  1 g Oral TID WC    hydroxyurea  1,000 mg Oral Daily    midodrine  5 mg Oral TID WC    sodium chloride flush  5-40 mL IntraVENous 2 times per day    rivaroxaban  20 mg Oral Daily    atorvastatin  80 mg Oral Nightly    [Held by provider] tamsulosin  0.4 mg Oral Daily        sodium chloride         PRN Meds:.melatonin, sodium chloride flush, sodium chloride, potassium chloride **OR** potassium alternative oral replacement **OR** potassium chloride, magnesium sulfate, ondansetron **OR** ondansetron, polyethylene glycol, acetaminophen **OR** acetaminophen, haloperidol lactate    Physical Exam:    TEMPERATURE:  Current - Temp: 97.5 °F (36.4 °C); Max - Temp  Av.9 °F (36.6 °C)  Min: 97.5 °F (36.4 °C)  Max: 98.5 °F (36.9 °C)  RESPIRATIONS RANGE: Resp  Av  Min: 11  Max: 20  PULSE RANGE: Pulse  Av.9  Min: 61  Max:

## 2025-03-18 NOTE — PROGRESS NOTES
[facility]  Palliative Care   Progress Note    NAME:  Maxx Norman  MEDICAL RECORD NUMBER:  [id number]  AGE: 79 y.o.   GENDER: male  : 1945  TODAY'S DATE:  3/18/2025    190 I have met with patient and his son/HPOA to clarify code status. His son was concerned his father would suffer if not placed on ventilator if he had difficulty breathing. I explained if he called EMS they could do bipap, meds for comfort and bring his dad to hospital if that is what they want intubation is not required for comfort and assist in breathing. His father has clearly said several times he does not want intubation under any circumstances.   We have completed new form and both patient and his son have signed placed in soft chart for physician signature.  Discussed with bedside nurse Fior.     I will continue to follow Mr. Norman's care as needed.      Thank you for allowing me to participate in the care of Mr. Norman .     Electronically signed by Veronique Wagner RN, BSN,CHPN on 3/18/2025 at 7:04 PM  Palliative Care Nurse [facility]  Office: [phone]

## 2025-03-18 NOTE — ACP (ADVANCE CARE PLANNING)
Advance Care Planning     Palliative Team Advance Care Planning (ACP) Conversation    Date of Conversation: 03/18/25    Individuals present for the conversation: Patient with decision making capacity and Legal healthcare agent named below     ACP documents on file prior to discussion:  -None    Previously completed document/s not on file:  HPOA/LW document was completed previously but it is not available for review. This writer requested a copy of the document for patient's chart.     Healthcare Decision Maker:    Primary Decision Maker: Benito Norman - Child - [phone]    Secondary Decision Maker: Alexus Braga - Spouse - [phone]     Conversation Summary:  We discussed code status he states he does not want chest compressions/shocking, intubation or resuscitative meds in the event of cardiac arrest or resp arrest/distress, he wants to die a peaceful death. Mr Carmona gave me permission to call his son. I have spoken to Benito Carmona patient's son and he agree with DNR CC code status for his dad and would like form so they have one at home. The patient has signed DNR form and I have requested Dr Egan to sign so we can give family a copy.     Resuscitation Status:   Code Status: DNR-CC     Documentation Completed:  -Portable DNR    I spent 30 minutes with the patient and/or surrogate decision maker discussing the patient's wishes and goals.      Electronically signed by Neris ARVIZU, RN, CHPN on 3/18/2025 at 11:28 AM  Palliative Care Nurse  Phone [phone]

## 2025-03-18 NOTE — CONSULTS
[facility]  Palliative Care   Consult Note    NAME:  Maxx Norman  MEDICAL RECORD NUMBER:  [id number]  AGE: 79 y.o.   GENDER: male  : 1945  TODAY'S DATE:  3/18/2025    Subjective     Reason for Consult:  goals of care and code status   Visit Type: Initial Consult      Maxx Norman is a 79 y.o. male referred by:   [x] Physician    PAST MEDICAL HISTORY      Diagnosis Date    Atrial fibrillation (HCC)     Basal cell carcinoma     right ear and right cheek    Bilateral tinnitus     Bradycardia     required pacemaker     CAD (coronary artery disease)     4 stents placed     GERD (gastroesophageal reflux disease)     Hyperlipidemia     Hypertension     Osteoarthritis        PAST SURGICAL HISTORY  Past Surgical History:   Procedure Laterality Date    A-V CARDIAC PACEMAKER INSERTION      bradycardia     CORONARY ANGIOPLASTY WITH STENT PLACEMENT      CT BIOPSY BONE MARROW  3/17/2025    CT BIOPSY BONE MARROW 3/17/2025 [facility] CT    EP DEVICE PROCEDURE N/A 2024    Insert PPM dual performed by Shankar Carballo MD at [facility] CARDIAC CATH LAB    EXTERNAL EAR SURGERY Right     removed basal cell cancer    SKIN CANCER DESTRUCTION  2017    Right cheek       FAMILY HISTORY  Family History   Problem Relation Age of Onset    Arthritis Mother     Heart Disease Father     Stroke Father     Other Brother         couldnt walk and in nursing home  disable     No Known Problems Maternal Grandmother     High Blood Pressure Maternal Grandfather     No Known Problems Paternal Grandmother     No Known Problems Paternal Grandfather        SOCIAL HISTORY  Social History     Tobacco Use    Smoking status: Former     Current packs/day: 0.00     Average packs/day: 0.5 packs/day for 7.0 years (3.5 ttl pk-yrs)     Types: Cigarettes     Start date: 1963     Quit date: 1970     Years since quittin.2    Smokeless tobacco: Never   Vaping Use    Vaping status: Never Used   Substance Use Topics    Alcohol  appetite. 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    I met with the patient to discuss goals of care he lives with his wife independent with ADL's.  His son drives him to appt and help with household maintenance as needed. I did discuss with patient advanced directives he does have completed forms. His son Benito is HPOA. We discussed code status he states he does not want chest compressions/shocking, intubation or resuscitative meds in the event of cardiac arrest or resp arrest/distress, he wants to die a peaceful death. Mr Carmona gave me permission to call his son. I have spoken to Benito Carmona patient's son and he agree with DNR CC code status for his dad and would like form so they have one at home. The patient has signed DNR form and I have requested Dr Egan to sign so we can give family a copy.   Dr Egan informed of above orders noted.     I will continue to follow Mr. Norman's care as needed.      Thank you for allowing me to participate in the care of Mr. Norman .     Electronically signed by Veronique Wagner, RN, BSN, CHPN on 3/18/2025 at 10:54 AM  Palliative Care Nurse [facility]  Office: [phone]

## 2025-03-18 NOTE — PROGRESS NOTES
Physical Therapy  Facility/Department: [address] PROGRESSIVE CARE  Physical Therapy Treatment Note    Name: Maxx Norman  : 1945  MRN: [id number]  Date of Service: 3/18/2025    Discharge Recommendations:  Continue to assess pending progress, Home with Home health PT   PT Equipment Recommendations  Equipment Needed: Yes  Mobility Devices: Walker  Walker: Rolling      Patient Diagnosis(es): The primary encounter diagnosis was Hyponatremia. Diagnoses of Generalized weakness, Dehydration, Adverse effect of drug, initial encounter, Hiccups, and Agitation were also pertinent to this visit.  Past Medical History:  has a past medical history of Atrial fibrillation (HCC), Basal cell carcinoma, Bilateral tinnitus, Bradycardia, CAD (coronary artery disease), GERD (gastroesophageal reflux disease), Hyperlipidemia, Hypertension, and Osteoarthritis.  Past Surgical History:  has a past surgical history that includes Coronary angioplasty with stent (); External ear surgery (Right); Skin cancer destruction (2017); A-V cardiac pacemaker insertion; ep device procedure (N/A, 2024); and CT BIOPSY BONE MARROW (3/17/2025).    Assessment  Body Structures, Functions, Activity Limitations Requiring Skilled Therapeutic Intervention: Decreased functional mobility ;Decreased strength;Decreased endurance;Decreased balance  Assessment: 80 y/o male admit 3/13/2025 with Hyponatremia, Slurred Speech, Generalized Weakness. CT Head : negative. PMH as noted including CAD, A-Fib, SSS, Pacemaker, CHF, Occipital Infarction, Vertigo, Bilat Tinnitus.  PTA pt living with family in house with level entry and 1st floor bed/bath; independent daily care and functional mobility (without assist device).  Today, pt. performed transfers with SBA and cues for hand placement and performed gait with RW with CGA. Anticipate adequate progress for d/c home with use of RW. Pt. may benefit from Home PT.  Therapy Prognosis: Good  Barriers to Learning:  [place].  Activity Tolerance  Activity Tolerance: Patient tolerated treatment well    Plan  Physical Therapy Plan  General Plan: 3-5 times per week  Current Treatment Recommendations: Strengthening, Therapeutic activities, Balance training, Functional mobility training, Transfer training, Gait training, Safety education & training, Patient/Caregiver education & training  Safety Devices  Type of Devices: Call light within reach, Gait belt, Bed alarm in place, Left in bed    Restrictions  Restrictions/Precautions  Restrictions/Precautions: Fall Risk     Subjective  General  Chart Reviewed: Yes  Patient assessed for rehabilitation services?: Yes  Additional Pertinent Hx: 78 y/o male admit 3/13/2025 with Hyponatremia, Slurred Speech, Generalized Weakness. CT Head : negative. PMH as noted including CAD, A-Fib, SSS, Pacemaker, CHF, Occipital Infarction, Vertigo, Bilat Tinnitus.  Family/Caregiver Present: No  Referring Practitioner: Dr. Newman  Other (Comment): Pt able to follow simple commands but is [place].  Subjective  Subjective: Pt agreeable to PT treatment, stating that he needs to use the bathroom. Pt. denied any pain.         Social/Functional History  Social/Functional History  Lives With: Spouse  Type of Home: House  Home Layout: Able to Live on Main level with bedroom/bathroom  Home Access: Level entry  Bathroom Shower/Tub: Shower chair with back  Bathroom Toilet: Standard  Bathroom Equipment: Tub transfer bench, Grab bars in shower, Grab bars around toilet  Bathroom Accessibility: Accessible  Home Equipment: Cane, Walker - Rolling  Has the patient had two or more falls in the past year or any fall with injury in the past year?: Yes  Prior Level of Assist for ADLs: Independent  Prior Level of Assist for Homemaking: Needs assistance (Son and granddtr help with groceries, cleaning, meals)  Homemaking Responsibilities: Yes  Prior Level of Assist for Ambulation: Independent household ambulator, with or without device,

## 2025-03-19 LAB
ALBUMIN SERPL-MCNC: 3.4 G/DL (ref 3.4–5)
ANION GAP SERPL CALCULATED.3IONS-SCNC: 11 MMOL/L (ref 3–16)
BUN SERPL-MCNC: 15 MG/DL (ref 7–20)
CALCIUM SERPL-MCNC: 9.5 MG/DL (ref 8.3–10.6)
CHLORIDE SERPL-SCNC: 96 MMOL/L (ref 99–110)
CO2 SERPL-SCNC: 24 MMOL/L (ref 21–32)
CREAT SERPL-MCNC: 1 MG/DL (ref 0.8–1.3)
GFR SERPLBLD CREATININE-BSD FMLA CKD-EPI: 76 ML/MIN/{1.73_M2}
GLUCOSE SERPL-MCNC: 101 MG/DL (ref 70–99)
PHOSPHATE SERPL-MCNC: 3.1 MG/DL (ref 2.5–4.9)
POTASSIUM SERPL-SCNC: 4.1 MMOL/L (ref 3.5–5.1)
SODIUM SERPL-SCNC: 129 MMOL/L (ref 136–145)
SODIUM SERPL-SCNC: 131 MMOL/L (ref 136–145)
SODIUM SERPL-SCNC: 132 MMOL/L (ref 136–145)

## 2025-03-19 PROCEDURE — 6370000000 HC RX 637 (ALT 250 FOR IP): Performed by: INTERNAL MEDICINE

## 2025-03-19 PROCEDURE — 2500000003 HC RX 250 WO HCPCS: Performed by: HOSPITALIST

## 2025-03-19 PROCEDURE — 97535 SELF CARE MNGMENT TRAINING: CPT

## 2025-03-19 PROCEDURE — 6370000000 HC RX 637 (ALT 250 FOR IP): Performed by: HOSPITALIST

## 2025-03-19 PROCEDURE — 97530 THERAPEUTIC ACTIVITIES: CPT

## 2025-03-19 PROCEDURE — 2060000000 HC ICU INTERMEDIATE R&B

## 2025-03-19 PROCEDURE — 36415 COLL VENOUS BLD VENIPUNCTURE: CPT

## 2025-03-19 PROCEDURE — 97116 GAIT TRAINING THERAPY: CPT

## 2025-03-19 PROCEDURE — 84295 ASSAY OF SERUM SODIUM: CPT

## 2025-03-19 PROCEDURE — 80069 RENAL FUNCTION PANEL: CPT

## 2025-03-19 RX ORDER — SODIUM CHLORIDE 1 G/1
1 TABLET ORAL 2 TIMES DAILY WITH MEALS
Status: DISCONTINUED | OUTPATIENT
Start: 2025-03-19 | End: 2025-03-21 | Stop reason: HOSPADM

## 2025-03-19 RX ADMIN — HYDROXYUREA 1000 MG: 500 CAPSULE ORAL at 08:11

## 2025-03-19 RX ADMIN — Medication 1 G: at 11:43

## 2025-03-19 RX ADMIN — SODIUM CHLORIDE, PRESERVATIVE FREE 10 ML: 5 INJECTION INTRAVENOUS at 08:11

## 2025-03-19 RX ADMIN — MIDODRINE HYDROCHLORIDE 5 MG: 5 TABLET ORAL at 08:10

## 2025-03-19 RX ADMIN — SODIUM CHLORIDE, PRESERVATIVE FREE 10 ML: 5 INJECTION INTRAVENOUS at 20:54

## 2025-03-19 RX ADMIN — Medication 1 G: at 08:10

## 2025-03-19 RX ADMIN — ATORVASTATIN CALCIUM 80 MG: 80 TABLET, FILM COATED ORAL at 20:53

## 2025-03-19 RX ADMIN — Medication 1 G: at 17:26

## 2025-03-19 RX ADMIN — RIVAROXABAN 20 MG: 20 TABLET, FILM COATED ORAL at 08:10

## 2025-03-19 RX ADMIN — MIDODRINE HYDROCHLORIDE 5 MG: 5 TABLET ORAL at 11:43

## 2025-03-19 RX ADMIN — MIDODRINE HYDROCHLORIDE 5 MG: 5 TABLET ORAL at 17:26

## 2025-03-19 NOTE — PROGRESS NOTES
Physical Therapy  Facility/Department: [address] PROGRESSIVE CARE  Physical Therapy Treatment Note    Name: Maxx Norman  : 1945  MRN: [id number]  Date of Service: 3/19/2025    Discharge Recommendations:  Continue to assess pending progress, Home with Home health PT   PT Equipment Recommendations  Other: Will monitor for potential equipt needs.      Maxx Norman scored a 20/24 on the AM-PAC short mobility form. Current research shows that an AM-PAC score of 18 or greater is typically associated with a discharge to the patient's home setting. Based on the patient's AM-PAC score and their current functional mobility deficits, it is recommended that the patient have 2-3 sessions per week of Physical Therapy at d/c to increase the patient's independence.  At this time, this patient demonstrates the endurance and safety to discharge home with Home PT Evaluation  and a follow up treatment frequency of 2-3x/wk.  Please see assessment section for further patient specific details.    If patient discharges prior to next session this note will serve as a discharge summary.  Please see below for the latest assessment towards goals.       Assessment  Body Structures, Functions, Activity Limitations Requiring Skilled Therapeutic Intervention: Decreased functional mobility ;Decreased strength;Decreased endurance;Decreased balance  Assessment: 78 y/o male admit 3/13/2025 with Hyponatremia, Slurred Speech, Generalized Weakness. CT Head : negative. PMH as noted including CAD, A-Fib, SSS, Pacemaker, CHF, Occipital Infarction, Vertigo, Bilat Tinnitus.  PTA pt living with family in house with level entry and 1st floor bed/bath; independent daily care and functional mobility (without assist device).  Currently pt able to move to eob, transfer/amb further functional distances with Walker SBA/Slight CGA.  Occass cues for safe Walker manage.  At this time would anticipate d/c home; recommend Home PT and cont use of  using your arms?: A Little  How much help is needed walking in hospital room?: A Little  How much help is needed climbing 3-5 steps with a railing?: A Little  AM-PAC Inpatient Mobility Raw Score : 20  AM-PAC Inpatient T-Scale Score : 47.67  Mobility Inpatient CMS 0-100% Score: 35.83  Mobility Inpatient CMS G-Code Modifier : CJ         Goals  Short Term Goals  Time Frame for Short Term Goals: Upon d/c acute care setting.  Short Term Goal 1: Bed Mob Independent.  Short Term Goal 2: Transfers with/without assist device Supervision.  Short Term Goal 3: Amb with/without assist device 150' Supervision.  Patient Goals   Patient Goals : Return home.       Education  Patient Education  Education Given To: Patient  Education Provided Comments: Role of PT, POC, Need to call for assist, Safe Transfers/Amb Activities with Walker.  Education Method: Verbal  Barriers to Learning: Hearing  Education Outcome: Verbalized understanding;Continued education needed      Therapy Time   Individual Concurrent Group Co-treatment   Time In 1332         Time Out 1403         Minutes 31                 Roseline Segovia, PT

## 2025-03-19 NOTE — PROGRESS NOTES
V2.0    [facility] Progress Note      Name:  Maxx Norman /Age/Sex: 1945  (79 y.o. male)   MRN & CSN:  [id number] & [id number] Encounter Date/Time: 3/19/2025 11:03 AM EDT   Location:  [id number] PCP: Michelle Humphries MD     Attending:Rodrigo Egan MD       Hospital Day: 7    Assessment and Recommendations   Maxx Norman is a 79 y.o. male  who presents with Hyponatremia      Plan:  Hyponatremia, status post 3% saline during this admission nephrology following on fluid restriction at this time improving.  Altered mental status with encephalopathy overall improving likely due to above.  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 consulted status post bone marrow biopsy today      Diet ADULT DIET; Regular; 1500 ml   DVT Prophylaxis [] Lovenox, []  Heparin, [] SCDs, [] Ambulation,  [] Eliquis, [] Xarelto  [] Coumadin   Code Status DNR-CC             Personally reviewed Lab Studies and Imaging       Rhythm strip independently interpreted by myself heart rate 78        Medical Decision Making:  The following items were considered in medical decision making:  Discussion of patient care with other providers  Reviewed clinical lab tests  Reviewed radiology tests  Reviewed other diagnostic tests/interventions  Independent review of radiologic images  Microbiology cultures and other micro tests reviewed      Subjective:     Chief Complaint: Weakness encephalopathy    Maxx Norman is a 79 y.o. male who presents with weakness encephalopathy overall improving significantly no fever chills nausea vomiting nurse at bedside      Review of Systems:      Pertinent positives and negatives discussed in HPI    Objective:     Intake/Output Summary (Last 24 hours) at 3/19/2025 1103  Last data filed at 3/19/2025 0700  Gross per 24 hour   Intake 640 ml   Output 1325 ml   Net -685 ml      Vitals:   Vitals:    25 2330 25 0316  ORDERING SYSTEM PROVIDED HISTORY: [facility] TECHNOLOGIST PROVIDED HISTORY: Reason for exam:->cml Reason for Exam: [facility] PHYSICIANS: Alexis Cortez M.D. SEDATION: After establishing stable IV access, supplemental oxygen was administered via nasal cannula. SaO2, BP, cardiac rhythm, and respiratory rate were monitored continuously. The patient was monitored by an independent, trained nurse for the entire procedure. An immediate reassessment was completed prior to sedation, and it was determined to be safe to proceed. Conscious sedation was then achieved by repetitive sequential administration of small IV doses of Versed and fentanyl.  Sedation was titrated so that adequate sedation, analgesia, and amnesia were achieved while protective reflexes were preserved.  Conscious sedation was well tolerated and there were no apparent complications.  After completion of the procedure, the patient was monitored for an appropriate period.  Sedation time was 8 minutes. Versed given: 2 mg IV Fentanyl given: 100 mcg IV TECHNIQUE: Informed consent was obtained following a detailed explanation of the procedure including risks, benefits, and alternatives. Universal protocol was followed. Sterile gowns, masks, hats and gloves utilized for maximal sterile barrier. Axial images were obtained through the iliac bones using CT guidance and a suitable skin site was prepped and draped in sterile fashion. Local anesthesia was achieved with lidocaine. An 11 gauge Eagle Alpha bone marrow biopsy needle was advanced into the right iliac bone and approximately 12 mL of bone marrow aspirate was obtained. A single core biopsy specimen was obtained and the patient tolerated the procedure well. Estimated blood loss: <5 mL Dose modulation, iterative reconstruction, and/or weight based adjustment of the mA/kV was utilized to reduce the radiation dose to as low as reasonably achievable. Total exam DLP: 332.79 mGy-cm     Successful CT guided bone marrow aspiration

## 2025-03-19 NOTE — CARE COORDINATION
know.\")     Advance Directives:      Code Status: DNR-CC   Patient's Primary Decision Maker is: Legal Next of Kin    Primary Decision Maker: Benito Norman - Child - [phone]    Secondary Decision Maker: Alexus Braga - Spouse - [phone]    Discharge Planning:    Patient lives with: Spouse/Significant Other Type of Home: House  Primary Care Giver: Self  Patient Support Systems include: Children, Family Members   Current Financial resources: Medicare  Current community resources: None  Current services prior to admission: Durable Medical Equipment, Home Care            Current DME: Shower Chair, Cane, Wheelchair            Type of Home Care services:  OT, PT, Skilled Therapy    ADLS  Prior functional level: Assistance with the following:, Cooking, Housework, Shopping, Mobility  Current functional level: Assistance with the following:, Feeding, Cooking, Housework, Shopping, Mobility    PT AM-PAC: 20 /24  OT AM-PAC: 19 /24    Family can provide assistance at DC: Yes  Would you like Case Management to discuss the discharge plan with any other family members/significant others, and if so, who? Yes (son, Benito)  Plans to Return to Present Housing: Yes  Other Identified Issues/Barriers to RETURNING to current housing: none  Potential Assistance needed at discharge: Durable Medical Equipment, Meals On Wheels, Home Care            Potential DME: Walker  Patient expects to discharge to: House  Plan for transportation at discharge: Family    Financial    Payor: MEDICARE / Plan: MEDICARE PART A AND B / Product Type: *No Product type* /     Does insurance require precert for SNF: No    Potential assistance Purchasing Medications: No  Meds-to-Beds request:        [facility] [id number] - [place], [place] - [address] [phone] - F [phone]  [address] [place] [address]  Phone: [phone] Fax: [phone]    [facility] - [place], [place] - [address] - P  [phone] - F [phone]  [address]  [address]  [facility] [address]  Phone: [phone] Fax: [phone]    [facility] - [place], [place] - [address] - P [phone] - F [phone]  [address]  [address]  [facility] [address]  Phone: [phone] Fax: [phone]      Notes:    Factors facilitating achievement of predicted outcomes: Family support, Cooperative, and Pleasant    Barriers to discharge: Medical complications    Additional Case Management Notes: Patient from home with family support and HHC via [facility]. Patient confirms his goal is to return home when medically stable for discharge from the hospital.    Patient's son provided with contact information for [facility] in [place], [place] to explore additional resources for his parents. Call placed to caro Lira with [facility], who confirmed they will follow patient to restart care.     Therapy recommending [facility] at [place]. This RN CM to work with physician to ensure appropriate orders and delivery of DME prior to patient departure.    The Plan for Transition of Care is related to the following treatment goals of Dehydration [E86.0]  Hiccups [R06.6]  Hyponatremia [E87.1]  Agitation [R45.1]  Generalized weakness [R53.1]  Adverse effect of drug, initial encounter [T50.905A]    IF APPLICABLE: The Patient and/or patient representative Maxx and his family were provided with a choice of provider and agrees with the discharge plan. Freedom of choice list with basic dialogue that supports the patient's individualized plan of care/goals and shares the quality data associated with the providers was provided to: Patient, Patient Representative   Patient Representative Name: soneBnito     The Patient and/or Patient Representative Agree with the Discharge Plan? Yes    SHAY BENSON RN  Case Management Department  Ph: [phone]

## 2025-03-19 NOTE — PROGRESS NOTES
Nephrology Progress Note   [email or web address]      Chief Complaint: Altered mental status    History of Present Illness: Patient is lethargic - history obtained from patients son and chart    Mr Norman is an 79 male with a past medical history significant for Hypertension, Hyperlipidemia, CML, CAD (stent LAD in ), A Fib, bradycardia s/p PPM and recent hospitalization at [facility] from 3/4/25 to 3/6/25 with volume overload that presented to the ED with altered mental status    Son stated he went to  his father to take to his Cardiology appointment and realized his father was not dressed appeared confused and could not follow instructions and that's when he decided to get to the ED. Upon arrival blood work revealed a serum sodium of 106 meq/L. He was also noted to be retaining urine so a james was inserted    Patient is making quite a bit of urine. He remains confused thrashing in bed. Nephrology consulted for critical life threatening Hyponatremia    Subjective:    Feels better , labs reviewed     ROS: no chest pain. No shortness of breath    Scheduled Meds:   sodium chloride  1 g Oral TID WC    hydroxyurea  1,000 mg Oral Daily    midodrine  5 mg Oral TID WC    sodium chloride flush  5-40 mL IntraVENous 2 times per day    rivaroxaban  20 mg Oral Daily    atorvastatin  80 mg Oral Nightly    [Held by provider] tamsulosin  0.4 mg Oral Daily        sodium chloride         PRN Meds:.melatonin, sodium chloride flush, sodium chloride, potassium chloride **OR** potassium alternative oral replacement **OR** potassium chloride, magnesium sulfate, ondansetron **OR** ondansetron, polyethylene glycol, acetaminophen **OR** acetaminophen, haloperidol lactate    Physical Exam:    TEMPERATURE:  Current - Temp: 97.6 °F (36.4 °C); Max - Temp  Av.8 °F (36.6 °C)  Min: 97.3 °F (36.3 °C)  Max: 98.1 °F (36.7 °C)  RESPIRATIONS RANGE: Resp  Av.7  Min: 18  Max: 20  PULSE RANGE: Pulse  Av.7  Min: 58  Max: 83  BLOOD  03/19/2025 03:48 AM     U/A:    Lab Results   Component Value Date/Time    NITRITE neg 09/19/2011 12:00 AM    COLORU Yellow 03/18/2025 02:53 PM    PHUR 6.0 03/18/2025 02:53 PM    PHUR 7.5 05/26/2022 02:30 AM    WBCUA 4 03/18/2025 02:53 PM    RBCUA 1 03/18/2025 02:53 PM    BACTERIA None Seen 03/18/2025 02:53 PM    CLARITYU Clear 03/18/2025 02:53 PM    SPECGRAV 1.015 09/19/2011 12:00 AM    LEUKOCYTESUR SMALL 03/18/2025 02:53 PM    UROBILINOGEN 4.0 03/18/2025 02:53 PM    BILIRUBINUR Negative 03/18/2025 02:53 PM    BLOODU Negative 03/18/2025 02:53 PM    GLUCOSEU Negative 03/18/2025 02:53 PM         IMPRESSION/RECOMMENDATIONS:      Principal Problem:    Hyponatremia  Resolved Problems:    * No resolved hospital problems. *      Hyponatremia - Etiology likely multifactorial (Thiazide diuretic/Volume depletion/Polydipsia/poor appetite and as a result probable low solute intake)  - off  all diuretics (patient was on Torsemide/Chlorthalidone and Aldactone)  - Given altered mental status started 3 % Saline 3/13/25  - Sodium correcting at desired rate   - S/p 3 % saline on 3/16/25  - Initial U osm 220; U Na 73  - Repeat U Osm 596 and U Na 142 on 3/16/25 consistent with SIADH  - Continue FR 1.5 L / day . On   sodium chloride tablets 3/16/25  Dose adjusted .     2. Altered mental status likely secondary to No 1 - Improving    3. HTN - controlled    4. New diagnosis of CML  S.p BM  per Onc  .    Paty Angeles MD,FACP

## 2025-03-19 NOTE — PLAN OF CARE
Problem: Chronic Conditions and Co-morbidities  Goal: Patient's chronic conditions and co-morbidity symptoms are monitored and maintained or improved  3/19/2025 0037 by Lexie Art RN  Outcome: Progressing     Problem: Discharge Planning  Goal: Discharge to home or other facility with appropriate resources  3/19/2025 0037 by Lexie Art RN  Outcome: Progressing     Problem: Pain  Goal: Verbalizes/displays adequate comfort level or baseline comfort level  3/19/2025 0037 by Lexie Art RN  Outcome: Progressing     Problem: Safety - Adult  Goal: Free from fall injury  3/19/2025 0037 by Lexie Art RN  Outcome: Progressing     Problem: Skin/Tissue Integrity  Goal: Skin integrity remains intact  Description: 1.  Monitor for areas of redness and/or skin breakdown  2.  Assess vascular access sites hourly  3.  Every 4-6 hours minimum:  Change oxygen saturation probe site  4.  Every 4-6 hours:  If on nasal continuous positive airway pressure, respiratory therapy assess nares and determine need for appliance change or resting period  3/19/2025 0037 by Lexie Art RN  Outcome: Progressing     Problem: Neurosensory - Adult  Goal: Achieves stable or improved neurological status  3/19/2025 0037 by Lexie Art RN  Outcome: Progressing     Problem: Neurosensory - Adult  Goal: Achieves maximal functionality and self care  3/19/2025 0037 by Lexie Art RN  Outcome: Progressing     Problem: Respiratory - Adult  Goal: Achieves optimal ventilation and oxygenation  3/19/2025 0037 by Lexie Art RN  Outcome: Progressing     Problem: Cardiovascular - Adult  Goal: Maintains optimal cardiac output and hemodynamic stability  3/19/2025 0037 by Lexie Art RN  Outcome: Progressing     Problem: Cardiovascular - Adult  Goal: Absence of cardiac dysrhythmias or at baseline  3/19/2025 0037 by Lexie Art RN  Outcome: Progressing     Problem: Skin/Tissue Integrity - Adult  Goal: Skin integrity remains  intact  Description: 1.  Monitor for areas of redness and/or skin breakdown  2.  Assess vascular access sites hourly  3.  Every 4-6 hours minimum:  Change oxygen saturation probe site  4.  Every 4-6 hours:  If on nasal continuous positive airway pressure, respiratory therapy assess nares and determine need for appliance change or resting period  3/19/2025 0037 by Lexie Art, RN  Outcome: Progressing     Problem: Skin/Tissue Integrity - Adult  Goal: Oral mucous membranes remain intact  3/19/2025 0037 by Lexie Art RN  Outcome: Progressing     Problem: Musculoskeletal - Adult  Goal: Return mobility to safest level of function  3/19/2025 0037 by Lexie Art RN  Outcome: Progressing     Problem: Musculoskeletal - Adult  Goal: Return ADL status to a safe level of function  3/19/2025 0037 by Lexie Art RN  Outcome: Progressing     Problem: Gastrointestinal - Adult  Goal: Minimal or absence of nausea and vomiting  3/19/2025 0037 by Lexie Art RN  Outcome: Progressing     Problem: Gastrointestinal - Adult  Goal: Maintains or returns to baseline bowel function  3/19/2025 0037 by Lexie Art RN  Outcome: Progressing     Problem: Genitourinary - Adult  Goal: Absence of urinary retention  3/19/2025 0037 by Lexie Art RN  Outcome: Progressing     Problem: Infection - Adult  Goal: Absence of infection at discharge  3/18/2025 1118 by Fior Valente RN  Outcome: Progressing     Problem: Infection - Adult  Goal: Absence of infection at discharge  3/19/2025 0037 by Lexie Art RN  Outcome: Progressing     Problem: Metabolic/Fluid and Electrolytes - Adult  Goal: Electrolytes maintained within normal limits  3/19/2025 0037 by Lexie Art RN  Outcome: Progressing     Problem: Hematologic - Adult  Goal: Maintains hematologic stability  3/19/2025 0037 by Lexie Art, RN  Outcome: Progressing

## 2025-03-19 NOTE — PLAN OF CARE
Problem: Chronic Conditions and Co-morbidities  Goal: Patient's chronic conditions and co-morbidity symptoms are monitored and maintained or improved  3/19/2025 0923 by Lyly Rosenberg RN  Outcome: Progressing     Problem: Discharge Planning  Goal: Discharge to home or other facility with appropriate resources  3/19/2025 0923 by Lyly Rosenberg RN  Outcome: Progressing     Problem: Pain  Goal: Verbalizes/displays adequate comfort level or baseline comfort level  3/19/2025 0923 by Lyly Rosenberg RN  Outcome: Progressing     Problem: Safety - Adult  Goal: Free from fall injury  3/19/2025 0923 by Lyly Rosenberg RN  Outcome: Progressing     Problem: Skin/Tissue Integrity  Goal: Skin integrity remains intact  Description: 1.  Monitor for areas of redness and/or skin breakdown  2.  Assess vascular access sites hourly  3.  Every 4-6 hours minimum:  Change oxygen saturation probe site  4.  Every 4-6 hours:  If on nasal continuous positive airway pressure, respiratory therapy assess nares and determine need for appliance change or resting period  3/19/2025 0923 by Lyly Rosenberg RN  Outcome: Progressing     Problem: Neurosensory - Adult  Goal: Achieves stable or improved neurological status  3/19/2025 0923 by Lyly Rosenberg RN  Outcome: Progressing     Problem: Neurosensory - Adult  Goal: Achieves maximal functionality and self care  3/19/2025 0923 by Lyly Rosenberg RN  Outcome: Progressing     Problem: Respiratory - Adult  Goal: Achieves optimal ventilation and oxygenation  3/19/2025 0923 by Lyly Rosenberg RN  Outcome: Progressing     Problem: Cardiovascular - Adult  Goal: Maintains optimal cardiac output and hemodynamic stability  3/19/2025 0923 by Lyly Rosenberg RN  Outcome: Progressing     Problem: Cardiovascular - Adult  Goal: Absence of cardiac dysrhythmias or at baseline  3/19/2025 0923 by Lyly Rosenberg RN  Outcome: Progressing     Problem: Skin/Tissue Integrity - Adult  Goal: Skin integrity remains

## 2025-03-19 NOTE — PROGRESS NOTES
the procedure well.    Estimated blood loss: <5 mL    Dose modulation, iterative reconstruction, and/or weight based adjustment of  the mA/kV was utilized to reduce the radiation dose to as low as reasonably  achievable.    Total exam DLP: 332.79 mGy-cm  Impression: Successful CT guided bone marrow aspiration and core biopsy of the right  iliac bone.      Problem List  Patient Active Problem List   Diagnosis    Hypercholesterolemia    Benign hypertension    CAD (coronary artery disease)    CAD (coronary artery disease)    BCC (basal cell carcinoma of skin)    Vision changes    Occipital infarction (HCC)    Hearing loss of both ears    Subjective tinnitus of both ears    Neck mass    Vision loss of right eye    Atrial fibrillation with slow ventricular response (HCC)    Former tobacco use    Acute bacterial sinusitis    Bradycardia with 31-40 beats per minute    Pacemaker    Essential hypertension    Peripheral vertigo    Vertigo    SSS (sick sinus syndrome) (HCC)    Localized edema    Acute on chronic heart failure with preserved ejection fraction (HCC)    Hyponatremia       ASSESSMENT AND PLAN:              ONCOLOGIC DISPOSITION:      Roseline Pablo MD  Please contact through [employer]                                      ONCOLOGY HEMATOLOGY CARE PROGRESS NOTE      SUBJECTIVE:      ROS:     Constitutional:  No weight loss, No fever, No chills, No night sweats.  Energy level good.  Eyes:  No impairment or change in vision  ENT / Mouth:  No pain, abnormal ulceration, bleeding, nasal drip or change in voice or hearing  Cardiovascular:  No chest pain, palpitations, new edema, or calf discomfort  Respiratory:  No pain, hemoptysis, change to breathing  Breast:  No pain, discharge, change in appearance or texture  Gastrointestinal:  No pain, cramping, jaundice, change to eating and bowel habits  Urinary:  No pain, bleeding or change in continence  Genitalia: No pain, bleeding or discharge  Musculoskeletal:  No

## 2025-03-19 NOTE — PROGRESS NOTES
Occupational Therapy    [facility] - Occupational Therapy   Phone: [phone]    Occupational Therapy    [] Initial Evaluation            [x] Daily Treatment Note         [] Discharge Summary      Patient: Maxx Norman   : 1945   MRN: [id number]   Date of Service:  3/19/2025     patient safety    OT addressing: [x]ADL's, [] Pericare,  [x]clothing management, [] BU E strength/endurance with functional tasks,  [] UE WB [x] Other: transfers      Staff Mobility Recommendation: RWx1    AM-PAC Score:   Discharge Recommendations: HHOT    Admitting Diagnosis:  Hyponatremia  Ordering Physician: Jonah Newman MD   Current Admission Summary: per ED note, Maxx Norman is a 79 y.o. male who presents with weakness and fatigue.  He also has slurred speech has been present for over a week but got worse over the past 48 hours.  He denies any vertigo.  He denies any focal weakness.  He had abnormalities on his CBC and was seen by hematology oncology which was an elevated white count and abnormal platelets.  He also had abnormal liver functions and scheduled to see a liver doctor.  He has started with hiccups 3 days ago.  They have gotten progressively worse.  He has a history of prolonged hiccups in the past but never this bad and never been treated for it.  His son states that he is unable to perform his activities of daily living.  Normally he can dress to self and feed himself but today he could not get out of bed to get dressed to go to an appointment with Dr. Reyes, cardiologist.  He was recently started on diuretics for swelling of his bilateral lower extremities.   Past Medical History:  has a past medical history of Atrial fibrillation (HCC), Basal cell carcinoma, Bilateral tinnitus, Bradycardia, CAD (coronary artery disease), GERD (gastroesophageal reflux disease), Hyperlipidemia, Hypertension, and Osteoarthritis.  Past Surgical History:  has a past surgical history that includes Coronary angioplasty

## 2025-03-20 LAB
ALBUMIN SERPL-MCNC: 3.6 G/DL (ref 3.4–5)
ALBUMIN SERPL-MCNC: 3.6 G/DL (ref 3.4–5)
ALP SERPL-CCNC: 167 U/L (ref 40–129)
ALT SERPL-CCNC: 52 U/L (ref 10–40)
ANION GAP SERPL CALCULATED.3IONS-SCNC: 8 MMOL/L (ref 3–16)
ANISOCYTOSIS BLD QL SMEAR: ABNORMAL
AST SERPL-CCNC: 72 U/L (ref 15–37)
BASOPHILS # BLD: 1.2 K/UL (ref 0–0.2)
BASOPHILS NFR BLD: 5 %
BILIRUB DIRECT SERPL-MCNC: 1.2 MG/DL (ref 0–0.3)
BILIRUB INDIRECT SERPL-MCNC: 1.2 MG/DL (ref 0–1)
BILIRUB SERPL-MCNC: 2.4 MG/DL (ref 0–1)
BUN SERPL-MCNC: 16 MG/DL (ref 7–20)
CALCIUM SERPL-MCNC: 9.6 MG/DL (ref 8.3–10.6)
CHLORIDE SERPL-SCNC: 94 MMOL/L (ref 99–110)
CO2 SERPL-SCNC: 26 MMOL/L (ref 21–32)
CREAT SERPL-MCNC: 1.1 MG/DL (ref 0.8–1.3)
DEPRECATED RDW RBC AUTO: 16 % (ref 12.4–15.4)
EOSINOPHIL # BLD: 0.5 K/UL (ref 0–0.6)
EOSINOPHIL NFR BLD: 2 %
GFR SERPLBLD CREATININE-BSD FMLA CKD-EPI: 68 ML/MIN/{1.73_M2}
GLUCOSE BLD-MCNC: 134 MG/DL (ref 70–99)
GLUCOSE SERPL-MCNC: 95 MG/DL (ref 70–99)
HCT VFR BLD AUTO: 34.5 % (ref 40.5–52.5)
HGB BLD-MCNC: 11.8 G/DL (ref 13.5–17.5)
LYMPHOCYTES # BLD: 1.6 K/UL (ref 1–5.1)
LYMPHOCYTES NFR BLD: 7 %
MACROCYTES BLD QL SMEAR: ABNORMAL
MCH RBC QN AUTO: 34.6 PG (ref 26–34)
MCHC RBC AUTO-ENTMCNC: 34.2 G/DL (ref 31–36)
MCV RBC AUTO: 101.2 FL (ref 80–100)
MONOCYTES # BLD: 1.4 K/UL (ref 0–1.3)
MONOCYTES NFR BLD: 6 %
NEUTROPHILS # BLD: 18.6 K/UL (ref 1.7–7.7)
NEUTROPHILS NFR BLD: 78 %
NEUTS BAND NFR BLD MANUAL: 2 % (ref 0–7)
OVALOCYTES BLD QL SMEAR: ABNORMAL
PATH INTERP BLD-IMP: NO
PERFORMED ON: ABNORMAL
PHOSPHATE SERPL-MCNC: 3.4 MG/DL (ref 2.5–4.9)
PLATELET # BLD AUTO: 967 K/UL (ref 135–450)
PMV BLD AUTO: 8.3 FL (ref 5–10.5)
POTASSIUM SERPL-SCNC: 4 MMOL/L (ref 3.5–5.1)
POTASSIUM SERPL-SCNC: ABNORMAL MMOL/L (ref 3.5–5.1)
PROT SERPL-MCNC: 6.3 G/DL (ref 6.4–8.2)
RBC # BLD AUTO: 3.41 M/UL (ref 4.2–5.9)
SODIUM SERPL-SCNC: 128 MMOL/L (ref 136–145)
SODIUM SERPL-SCNC: 130 MMOL/L (ref 136–145)
SODIUM SERPL-SCNC: 131 MMOL/L (ref 136–145)
TOXIC GRANULES BLD QL SMEAR: PRESENT
WBC # BLD AUTO: 23.3 K/UL (ref 4–11)

## 2025-03-20 PROCEDURE — 97535 SELF CARE MNGMENT TRAINING: CPT

## 2025-03-20 PROCEDURE — 6370000000 HC RX 637 (ALT 250 FOR IP): Performed by: HOSPITALIST

## 2025-03-20 PROCEDURE — 97110 THERAPEUTIC EXERCISES: CPT

## 2025-03-20 PROCEDURE — 97116 GAIT TRAINING THERAPY: CPT

## 2025-03-20 PROCEDURE — 84132 ASSAY OF SERUM POTASSIUM: CPT

## 2025-03-20 PROCEDURE — 6370000000 HC RX 637 (ALT 250 FOR IP): Performed by: INTERNAL MEDICINE

## 2025-03-20 PROCEDURE — 36415 COLL VENOUS BLD VENIPUNCTURE: CPT

## 2025-03-20 PROCEDURE — 2500000003 HC RX 250 WO HCPCS: Performed by: HOSPITALIST

## 2025-03-20 PROCEDURE — 80076 HEPATIC FUNCTION PANEL: CPT

## 2025-03-20 PROCEDURE — 97530 THERAPEUTIC ACTIVITIES: CPT

## 2025-03-20 PROCEDURE — 94760 N-INVAS EAR/PLS OXIMETRY 1: CPT

## 2025-03-20 PROCEDURE — 80069 RENAL FUNCTION PANEL: CPT

## 2025-03-20 PROCEDURE — 2060000000 HC ICU INTERMEDIATE R&B

## 2025-03-20 PROCEDURE — 84295 ASSAY OF SERUM SODIUM: CPT

## 2025-03-20 RX ORDER — SODIUM CHLORIDE 1 G/1
1 TABLET ORAL 2 TIMES DAILY WITH MEALS
Qty: 60 TABLET | Refills: 0 | Status: SHIPPED | OUTPATIENT
Start: 2025-03-20 | End: 2025-03-21

## 2025-03-20 RX ORDER — MIDODRINE HYDROCHLORIDE 5 MG/1
5 TABLET ORAL
Qty: 90 TABLET | Refills: 0 | Status: SHIPPED | OUTPATIENT
Start: 2025-03-20 | End: 2025-03-21 | Stop reason: HOSPADM

## 2025-03-20 RX ADMIN — Medication 1 G: at 08:20

## 2025-03-20 RX ADMIN — ATORVASTATIN CALCIUM 80 MG: 80 TABLET, FILM COATED ORAL at 20:56

## 2025-03-20 RX ADMIN — SODIUM CHLORIDE, PRESERVATIVE FREE 10 ML: 5 INJECTION INTRAVENOUS at 20:57

## 2025-03-20 RX ADMIN — Medication 1 G: at 16:43

## 2025-03-20 RX ADMIN — ACETAMINOPHEN 650 MG: 325 TABLET ORAL at 20:56

## 2025-03-20 RX ADMIN — RIVAROXABAN 20 MG: 20 TABLET, FILM COATED ORAL at 08:20

## 2025-03-20 RX ADMIN — HYDROXYUREA 1000 MG: 500 CAPSULE ORAL at 08:20

## 2025-03-20 RX ADMIN — Medication 3 MG: at 20:56

## 2025-03-20 RX ADMIN — SODIUM CHLORIDE, PRESERVATIVE FREE 10 ML: 5 INJECTION INTRAVENOUS at 08:21

## 2025-03-20 RX ADMIN — MIDODRINE HYDROCHLORIDE 5 MG: 5 TABLET ORAL at 11:42

## 2025-03-20 ASSESSMENT — PAIN DESCRIPTION - ORIENTATION: ORIENTATION: LEFT

## 2025-03-20 ASSESSMENT — PAIN SCALES - GENERAL
PAINLEVEL_OUTOF10: 3
PAINLEVEL_OUTOF10: 8

## 2025-03-20 ASSESSMENT — PAIN DESCRIPTION - LOCATION: LOCATION: SHOULDER

## 2025-03-20 ASSESSMENT — PAIN DESCRIPTION - DESCRIPTORS: DESCRIPTORS: ACHING

## 2025-03-20 NOTE — PROGRESS NOTES
03/18/2025 02:53 PM    SPECGRAV 1.015 09/19/2011 12:00 AM    LEUKOCYTESUR SMALL 03/18/2025 02:53 PM    UROBILINOGEN 4.0 03/18/2025 02:53 PM    BILIRUBINUR Negative 03/18/2025 02:53 PM    BLOODU Negative 03/18/2025 02:53 PM    GLUCOSEU Negative 03/18/2025 02:53 PM    KETUA Negative 03/18/2025 02:53 PM     Urine Cultures: No results found for: \"LABURIN\"  Blood Cultures: No results found for: \"BC\"  No results found for: \"BLOODCULT2\"  Organism: No results found for: \"ORG\"      Electronically signed by Rodrigo Egan MD on 3/20/2025 at 6:57 PM  Comment: Please note this report has been produced using speech recognition software and may contain errors related to that system including errors in grammar, punctuation, and spelling, as well as words and phrases that may be inappropriate. If there are any questions or concerns, please feel free to contact the dictating provider for clarification.

## 2025-03-20 NOTE — PROGRESS NOTES
Physical Therapy  Facility/Department: [facility] 5 PROGRESSIVE CARE  Physical Therapy Treatment Note    Name: Maxx Norman  : 1945  MRN: [id number]  Date of Service: 3/20/2025    Discharge Recommendations:  Continue to assess pending progress, Home with Home health PT   PT Equipment Recommendations  Other: Will monitor for potential equipt needs.    Maxx Norman scored a 22/24 on the AM-PAC short mobility form. Current research shows that an AM-PAC score of 18 or greater is typically associated with a discharge to the patient's home setting. Based on the patient's AM-PAC score and their current functional mobility deficits, it is recommended that the patient have 2-3 sessions per week of Physical Therapy at d/c to increase the patient's independence.  At this time, this patient demonstrates the endurance and safety to discharge home with HHPT and a follow up treatment frequency of 2-3x/wk.  Please see assessment section for further patient specific details.    If patient discharges prior to next session this note will serve as a discharge summary.  Please see below for the latest assessment towards goals.         Patient Diagnosis(es): The primary encounter diagnosis was Hyponatremia. Diagnoses of Generalized weakness, Dehydration, Adverse effect of drug, initial encounter, Hiccups, and Agitation were also pertinent to this visit.  Past Medical History:  has a past medical history of Atrial fibrillation (HCC), Basal cell carcinoma, Bilateral tinnitus, Bradycardia, CAD (coronary artery disease), GERD (gastroesophageal reflux disease), Hyperlipidemia, Hypertension, and Osteoarthritis.  Past Surgical History:  has a past surgical history that includes Coronary angioplasty with stent (); External ear surgery (Right); Skin cancer destruction (2017); A-V cardiac pacemaker insertion; ep device procedure (N/A, 2024); and CT BIOPSY BONE MARROW (3/17/2025).    Assessment  Body Structures, Functions,  session)  Transfers  Sit to Stand: Stand by assistance  Stand to Sit: Stand by assistance  Comment: cues for hand placement with RW  Ambulation  Surface: Level tile  Device: Rolling Walker  Assistance: Contact guard assistance;Stand by assistance  Quality of Gait: no unsteadiness or LOB noted; CGA progressing to SBA  Gait Deviations: Slow Henna;Decreased step length  Distance: 100'+150'     Balance  Sitting - Static: Good;-  Sitting - Dynamic: Good;-  Standing - Static: Fair;+ (with RW)  Standing - Dynamic: Fair;+ (with RW)  Exercise Treatment: STS x 10 SBA, seated exercises x 15 each BLE: TASHI archer AP       AM-PAC - Mobility    AM-PAC Basic Mobility - Inpatient   How much help is needed turning from your back to your side while in a flat bed without using bedrails?: None  How much help is needed moving from lying on your back to sitting on the side of a flat bed without using bedrails?: None  How much help is needed moving to and from a bed to a chair?: None  How much help is needed standing up from a chair using your arms?: None  How much help is needed walking in hospital room?: A Little  How much help is needed climbing 3-5 steps with a railing?: A Little  AM-[facility] Inpatient Mobility Raw Score : 22  AM-PAC Inpatient T-Scale Score : 53.28  Mobility Inpatient CMS 0-100% Score: 20.91  Mobility Inpatient CMS G-Code Modifier : CJ      Goals  Short Term Goals  Time Frame for Short Term Goals: Upon d/c acute care setting.  Short Term Goal 1: Bed Mob Independent.  Short Term Goal 2: Transfers with/without assist device Supervision.  Short Term Goal 3: Amb with/without assist device 150' Supervision.  Patient Goals   Patient Goals : Return home.       Education  Patient Education  Education Given To: Patient  Education Provided: Role of Therapy;Transfer Training;Mobility Training;Plan of Care  Education Method: Verbal  Barriers to Learning: Hearing  Education Outcome: Verbalized understanding;Continued education

## 2025-03-20 NOTE — CONSULTS
GASTROENTEROLOGY INPATIENT CONSULTATION        IDENTIFYING DATA/REASON FOR CONSULTATION   PATIENT:  Maxx Norman  MRN:  [id number]  ADMIT DATE: 3/13/2025  TIME OF EVALUATION: 3/20/2025 3:15 PM  HOSPITAL STAY:   LOS: 7 days     REASON FOR CONSULTATION:  elevated LFTs    HISTORY OF PRESENT ILLNESS   Maxx Norman is a 79 y.o. male with a PMH of A-fib on Xarelto CAD GERD hypertension hyperlipidemia basal cell carcinoma who presented on 3/13/2025 with generalized weakness. We have been consulted regarding elevated LFTs.  Patient denies nausea vomiting abdominal pain hematochezia or melena.  Denies history of liver disease or family history.  Denies history of alcohol or drug use.  Denies taking any herbal supplements or new medications at home.      Prior Endoscopic Evaluations: None    PAST MEDICAL, SURGICAL, FAMILY, and SOCIAL HISTORY     Past Medical History:   Diagnosis Date    Atrial fibrillation (HCC)     Basal cell carcinoma     right ear and right cheek    Bilateral tinnitus 2005    Bradycardia     required pacemaker     CAD (coronary artery disease)     4 stents placed     GERD (gastroesophageal reflux disease)     Hyperlipidemia     Hypertension     Osteoarthritis      Past Surgical History:   Procedure Laterality Date    A-V CARDIAC PACEMAKER INSERTION      bradycardia     CORONARY ANGIOPLASTY WITH STENT PLACEMENT  2008    CT BIOPSY BONE MARROW  3/17/2025    CT BIOPSY BONE MARROW 3/17/2025 [facility] CT    EP DEVICE PROCEDURE N/A 7/22/2024    Insert PPM dual performed by Shankar Carballo MD at [facility] CARDIAC CATH LAB    EXTERNAL EAR SURGERY Right     removed basal cell cancer    SKIN CANCER DESTRUCTION  03/2017    Right cheek     Family History   Problem Relation Age of Onset    Arthritis Mother     Heart Disease Father     Stroke Father     Other Brother         couldnt walk and in nursing home  disable     No Known Problems Maternal Grandmother     High Blood Pressure Maternal Grandfather     No Known Problems  Duong    RECOMMENDATIONS:    Monitor and trend LFTs      If you have any questions or need any further information, please feel free to contact our consult team.  Thank you for allowing us to participate in the care of Maxx Norman.    The note was completed using Dragon voice recognition transcription. Every effort was made to ensure accuracy; however, inadvertent transcription errors may be present despite my best efforts to edit errors.      Julia Velez PA-C 3/20/2025 at 3:15 PM

## 2025-03-20 NOTE — PROGRESS NOTES
Occupational Therapy    [facility] - Occupational Therapy   Phone: [phone]    Occupational Therapy    [] Initial Evaluation            [x] Daily Treatment Note         [] Discharge Summary      Patient: Maxx Norman   : 1945   MRN: [id number]   Date of Service:  3/20/2025     patient safety    OT addressing: [x]ADL's, [] Pericare,  [x]clothing management, [x] BU E strength/endurance with functional tasks,  [] UE WB [x] Other: transfers      Staff Mobility Recommendation: RWx1    AM-PAC Score:   Discharge Recommendations: HHOT    Admitting Diagnosis:  Hyponatremia  Ordering Physician: Jonah Newman MD   Current Admission Summary: per ED note, Maxx Norman is a 79 y.o. male who presents with weakness and fatigue.  He also has slurred speech has been present for over a week but got worse over the past 48 hours.  He denies any vertigo.  He denies any focal weakness.  He had abnormalities on his CBC and was seen by hematology oncology which was an elevated white count and abnormal platelets.  He also had abnormal liver functions and scheduled to see a liver doctor.  He has started with hiccups 3 days ago.  They have gotten progressively worse.  He has a history of prolonged hiccups in the past but never this bad and never been treated for it.  His son states that he is unable to perform his activities of daily living.  Normally he can dress to self and feed himself but today he could not get out of bed to get dressed to go to an appointment with Dr. Reyes, cardiologist.  He was recently started on diuretics for swelling of his bilateral lower extremities.   Past Medical History:  has a past medical history of Atrial fibrillation (HCC), Basal cell carcinoma, Bilateral tinnitus, Bradycardia, CAD (coronary artery disease), GERD (gastroesophageal reflux disease), Hyperlipidemia, Hypertension, and Osteoarthritis.  Past Surgical History:  has a past surgical history that includes Coronary

## 2025-03-20 NOTE — PLAN OF CARE
Problem: Discharge Planning  Goal: Discharge to home or other facility with appropriate resources  Outcome: Not Progressing     Problem: Neurosensory - Adult  Goal: Achieves stable or improved neurological status  Outcome: Not Progressing  Goal: Achieves maximal functionality and self care  Outcome: Not Progressing     Problem: Musculoskeletal - Adult  Goal: Return ADL status to a safe level of function  Outcome: Not Progressing     Problem: Gastrointestinal - Adult  Goal: Maintains or returns to baseline bowel function  Outcome: Not Progressing     Problem: Hematologic - Adult  Goal: Maintains hematologic stability  Outcome: Not Progressing

## 2025-03-20 NOTE — PROGRESS NOTES
Awaiting discharge for outpt fu for cml tx and options   Again a slow growing blood disease not immediately impacts prognosis -would in five years if left untreated

## 2025-03-20 NOTE — DISCHARGE INSTR - COC
Continuity of Care Form    Patient Name: Maxx Norman   :  1945  MRN:  [id number]    Admit date:  3/13/2025  Discharge date:  3/21/2025    Code Status Order: DNR-CC   Advance Directives:     Admitting Physician:  Boris Cunningham MD  PCP: Michelle Humphries MD    Discharging Nurse: Mayi Powellarging [facility] Unit/Room#: D5U-3974/5114-01  Discharging Unit Phone Number: [phone]    Emergency Contact:   Extended Emergency Contact Information  Primary Emergency Contact: Benito Norman  Address: [address] [place] [address] [facility]  Home Phone: [phone]  Relation: Child  Secondary Emergency Contact: Alexus [place] [address] [facility]  Home Phone: [phone]  Relation: Spouse    Past Surgical History:  Past Surgical History:   Procedure Laterality Date    A-V CARDIAC PACEMAKER INSERTION      bradycardia     CORONARY ANGIOPLASTY WITH STENT PLACEMENT      CT BIOPSY BONE MARROW  3/17/2025    CT BIOPSY BONE MARROW 3/17/2025 [facility] CT    EP DEVICE PROCEDURE N/A 2024    Insert PPM dual performed by Shankar Carballo MD at [facility] CARDIAC CATH LAB    EXTERNAL EAR SURGERY Right     removed basal cell cancer    SKIN CANCER DESTRUCTION  2017    Right cheek       Immunization History:   Immunization History   Administered Date(s) Administered    Influenza Vaccine, unspecified formulation 10/21/2015    Influenza Virus Vaccine 2015    Influenza, FLUAD, (age 65 y+), IM, Quadv, 0.5mL 10/21/2020, 09/15/2022, 10/02/2023    Influenza, FLUBLOK, (age 18 y+), Quadv PF, 0.5mL 10/22/2019    Influenza, FLUCELVAX, (age 6 mo+) IM, Trivalent PF, 0.5mL 2024    Influenza, FLUCELVAX, (age 6 mo+), MDCK, Quadv PF, 0.5mL 2022    Influenza, FLUZONE High Dose, (age 65 y+), IM, Trivalent PF, 0.5mL 2017, 10/09/2018    Pneumococcal, PCV-13, PREVNAR 13, (age 6w+), IM, 0.5mL 2016    Pneumococcal, PPSV23, PNEUMOVAX 23, (age 2y+), SC/IM,  0.5mL 11/07/2008, 07/21/2010    Td, unspecified formulation 05/13/2008    Zoster Live (Zostavax) 04/29/2015    Zoster Recombinant (Shingrix) 09/04/2019       Active Problems:  Patient Active Problem List   Diagnosis Code    Hypercholesterolemia E78.00    Benign hypertension I10    CAD (coronary artery disease) I25.10    CAD (coronary artery disease) I25.10    BCC (basal cell carcinoma of skin) C44.91    Vision changes H53.9    Occipital infarction (HCC) I63.9    Hearing loss of both ears H91.93    Subjective tinnitus of both ears H93.13    Neck mass R22.1    Vision loss of right eye H54.61    Atrial fibrillation with slow ventricular response ([facility]) I48.91    Former tobacco use Z87.891    Acute bacterial sinusitis J01.90, B96.89    Bradycardia with 31-40 beats per minute R00.1    Pacemaker Z95.0    Essential hypertension I10    Peripheral vertigo H81.399    Vertigo R42    SSS (sick sinus syndrome) ([facility]) I49.5    Localized edema R60.0    Acute on chronic heart failure with preserved ejection fraction ([facility]) I50.33    Hyponatremia E87.1       Isolation/Infection:   Isolation            No Isolation          Patient Infection Status    None to display              Nurse Assessment:  Last Vital Signs: BP (!) 109/54   Pulse 62   Temp 97.3 °F (36.3 °C) (Oral)   Resp 16   Ht 1.88 m (6' 2\")   Wt 80.9 kg (178 lb 5.6 oz)   SpO2 100%   BMI 22.90 kg/m²     Last documented pain score (0-10 scale): Pain Level: 0  Last Weight:   Wt Readings from Last 1 Encounters:   03/20/25 80.9 kg (178 lb 5.6 oz)     Mental Status:  oriented and alert    IV Access:  - None    Nursing Mobility/ADLs:  Walking   Independent  Transfer  Independent  Bathing  Independent  Dressing  Independent  Toileting  Independent  Feeding  Independent  Med Admin  Assisted  Med Delivery   whole    Wound Care Documentation and Therapy:  Wound 07/22/24 Chest Left;Upper pacemaker site incision (Active)   Number of days: 241       Incision 03/18/25 Back

## 2025-03-20 NOTE — PLAN OF CARE
Problem: Chronic Conditions and Co-morbidities  Goal: Patient's chronic conditions and co-morbidity symptoms are monitored and maintained or improved  3/20/2025 0825 by Mayi Arias RN  Outcome: Progressing  3/20/2025 0645 by Luci Beltran RN  Outcome: Progressing     Problem: Discharge Planning  Goal: Discharge to home or other facility with appropriate resources  3/20/2025 0825 by Mayi Arias RN  Outcome: Progressing  3/20/2025 0645 by Luci Beltran RN  Outcome: Not Progressing     Problem: Pain  Goal: Verbalizes/displays adequate comfort level or baseline comfort level  3/20/2025 0825 by Mayi Arias RN  Outcome: Progressing  3/20/2025 0645 by Luci Beltran RN  Outcome: Progressing     Problem: Safety - Adult  Goal: Free from fall injury  3/20/2025 0825 by Mayi Arias RN  Outcome: Progressing  3/20/2025 0645 by Luci Beltran RN  Outcome: Progressing     Problem: Skin/Tissue Integrity  Goal: Skin integrity remains intact  Description: 1.  Monitor for areas of redness and/or skin breakdown  2.  Assess vascular access sites hourly  3.  Every 4-6 hours minimum:  Change oxygen saturation probe site  4.  Every 4-6 hours:  If on nasal continuous positive airway pressure, respiratory therapy assess nares and determine need for appliance change or resting period  3/20/2025 0825 by Mayi Arias RN  Outcome: Progressing  3/20/2025 0645 by Luci Beltran RN  Outcome: Progressing     Problem: Neurosensory - Adult  Goal: Achieves stable or improved neurological status  3/20/2025 0825 by Mayi Arias RN  Outcome: Progressing  3/20/2025 0645 by Luci Beltran RN  Outcome: Not Progressing  Goal: Achieves maximal functionality and self care  3/20/2025 0825 by Mayi Arias RN  Outcome: Progressing  3/20/2025 0645 by Luci Beltran RN  Outcome: Not Progressing     Problem: Respiratory - Adult  Goal: Achieves optimal ventilation and oxygenation  3/20/2025 0825 by Mayi Arias RN  Outcome:  Progressing  3/20/2025 0645 by Luci Beltran RN  Outcome: Progressing     Problem: Cardiovascular - Adult  Goal: Maintains optimal cardiac output and hemodynamic stability  3/20/2025 0825 by Mayi Arias RN  Outcome: Progressing  3/20/2025 0645 by Luci Beltran RN  Outcome: Progressing  Goal: Absence of cardiac dysrhythmias or at baseline  3/20/2025 0825 by Mayi Arias RN  Outcome: Progressing  3/20/2025 0645 by Luci Beltran RN  Outcome: Progressing     Problem: Skin/Tissue Integrity - Adult  Goal: Skin integrity remains intact  Description: 1.  Monitor for areas of redness and/or skin breakdown  2.  Assess vascular access sites hourly  3.  Every 4-6 hours minimum:  Change oxygen saturation probe site  4.  Every 4-6 hours:  If on nasal continuous positive airway pressure, respiratory therapy assess nares and determine need for appliance change or resting period  3/20/2025 0825 by Mayi Arias RN  Outcome: Progressing  3/20/2025 0645 by Luci Beltran RN  Outcome: Progressing  Goal: Oral mucous membranes remain intact  3/20/2025 0825 by Mayi Arias RN  Outcome: Progressing  3/20/2025 0645 by Luci Beltran RN  Outcome: Progressing     Problem: Musculoskeletal - Adult  Goal: Return mobility to safest level of function  3/20/2025 0825 by Mayi Arias RN  Outcome: Progressing  3/20/2025 0645 by Luci Beltran RN  Outcome: Progressing  Goal: Return ADL status to a safe level of function  3/20/2025 0825 by Mayi Arias RN  Outcome: Progressing  3/20/2025 0645 by Luci Beltran RN  Outcome: Not Progressing     Problem: Gastrointestinal - Adult  Goal: Minimal or absence of nausea and vomiting  3/20/2025 0825 by Mayi Arias RN  Outcome: Progressing  3/20/2025 0645 by Luci Beltran RN  Outcome: Progressing  Goal: Maintains or returns to baseline bowel function  3/20/2025 0825 by Mayi Arias RN  Outcome: Progressing  3/20/2025 0645 by Luci Beltran, RN  Outcome: Not

## 2025-03-20 NOTE — PROGRESS NOTES
Physician Progress Note      PATIENT:               SHABBIR FITZGERALD  [facility] #:                  [id number]  :                       1945  ADMIT DATE:       3/4/2025 11:27 AM  DISCH DATE:        3/6/2025 3:33 PM  RESPONDING  PROVIDER #:        Sergio Espinal MD          QUERY TEXT:    Patient admitted with Acute on chronic heart failure with preserved EF in H&P   3/ ,no dyspnea on exertion .In order to support the diagnosis of Acute on   chronic heart failure with preserved EF, please include additional clinical   indicators in your documentation.  Or please document if the diagnosis of    Acute on chronic heart failure with preserved EF has been ruled out after   further study.    The medical record reflects the following:  Risk Factors: HTN, A-Fib  Clinical Indicators: H&P 3/  Acute on chronic heart failure with preserved EF  -last echo appears to be 2016, normal EF elevated pro-BNP, significant LE   edema  Cardiology CN 3/5 Lower extremity edema Echo shows normal LV size and   function, normal RV and mild TR Although amlodipine can contribute I doubt if   that is the cause of this lower extremity edema  Echo 3/5 with hyperdynamic LVSF, EF 70%; indeterminate diastolic dysfunction,   Mild TR  IM PN 3/5 cardiology consulted. Discussed 3/5: may need loop diuretic at   discharge. Echocardiogram looks fairly good. Not clear that LE edema is fully   cardiac.  DS 3/6  Lower extremity swelling: Patient presented to hospital with fever   progressive lower extremity swelling Echocardiogram did not reveal any cardiac   dysfunction and cardiology did not feel that patient's swelling was due to   heart failure.    Pro     Treatment:  IV Lasix, Cardiology consult, Serial Lab , Amlodipine      Thank You  Lakisha BRENNAN CDS  Options provided:  -- Acute on chronic heart failure with preserved EF present as evidenced by,   Please document evidence.  -- Acute heart failure with preserved EF was ruled out,

## 2025-03-20 NOTE — PROGRESS NOTES
Nephrology Progress Note   [email or web address]      Chief Complaint: Altered mental status    History of Present Illness: Patient is lethargic - history obtained from patients son and chart    Mr Norman is an 79 male with a past medical history significant for Hypertension, Hyperlipidemia, CML, CAD (stent LAD in ), A Fib, bradycardia s/p PPM and recent hospitalization at [facility] from 3/4/25 to 3/6/25 with volume overload that presented to the ED with altered mental status    Son stated he went to  his father to take to his Cardiology appointment and realized his father was not dressed appeared confused and could not follow instructions and that's when he decided to get to the ED. Upon arrival blood work revealed a serum sodium of 106 meq/L. He was also noted to be retaining urine so a james was inserted    Patient is making quite a bit of urine. He remains confused thrashing in bed. Nephrology consulted for critical life threatening Hyponatremia    Subjective:    Feels better , labs reviewed     ROS: no chest pain. No shortness of breath    Scheduled Meds:   sodium chloride  1 g Oral BID WC    hydroxyurea  1,000 mg Oral Daily    midodrine  5 mg Oral TID WC    sodium chloride flush  5-40 mL IntraVENous 2 times per day    rivaroxaban  20 mg Oral Daily    atorvastatin  80 mg Oral Nightly    [Held by provider] tamsulosin  0.4 mg Oral Daily        sodium chloride         PRN Meds:.melatonin, sodium chloride flush, sodium chloride, potassium chloride **OR** potassium alternative oral replacement **OR** potassium chloride, magnesium sulfate, ondansetron **OR** ondansetron, polyethylene glycol, acetaminophen **OR** acetaminophen, haloperidol lactate    Physical Exam:    TEMPERATURE:  Current - Temp: 97.3 °F (36.3 °C); Max - Temp  Av.9 °F (36.6 °C)  Min: 97.3 °F (36.3 °C)  Max: 98.4 °F (36.9 °C)  RESPIRATIONS RANGE: Resp  Av.6  Min: 16  Max: 18  PULSE RANGE: Pulse  Av.7  Min: 60  Max: 66  BLOOD

## 2025-03-21 ENCOUNTER — APPOINTMENT (OUTPATIENT)
Dept: CT IMAGING | Age: 80
End: 2025-03-21
Payer: MEDICARE

## 2025-03-21 VITALS
TEMPERATURE: 97.3 F | SYSTOLIC BLOOD PRESSURE: 111 MMHG | OXYGEN SATURATION: 100 % | HEART RATE: 64 BPM | HEIGHT: 74 IN | DIASTOLIC BLOOD PRESSURE: 56 MMHG | RESPIRATION RATE: 19 BRPM | BODY MASS INDEX: 22.04 KG/M2 | WEIGHT: 171.74 LBS

## 2025-03-21 LAB
ACANTHOCYTES BLD QL SMEAR: ABNORMAL
ALBUMIN SERPL-MCNC: 3.7 G/DL (ref 3.4–5)
ALBUMIN/GLOB SERPL: 1.4 {RATIO} (ref 1.1–2.2)
ALP SERPL-CCNC: 178 U/L (ref 40–129)
ALT SERPL-CCNC: 57 U/L (ref 10–40)
ANION GAP SERPL CALCULATED.3IONS-SCNC: 11 MMOL/L (ref 3–16)
ANISOCYTOSIS BLD QL SMEAR: ABNORMAL
AST SERPL-CCNC: 78 U/L (ref 15–37)
BASOPHILS # BLD: 0.4 K/UL (ref 0–0.2)
BASOPHILS NFR BLD: 3 %
BILIRUB SERPL-MCNC: 2.3 MG/DL (ref 0–1)
BUN SERPL-MCNC: 16 MG/DL (ref 7–20)
BURR CELLS BLD QL SMEAR: ABNORMAL
BURR CELLS BLD QL SMEAR: ABNORMAL
CALCIUM SERPL-MCNC: 9.3 MG/DL (ref 8.3–10.6)
CHLORIDE SERPL-SCNC: 98 MMOL/L (ref 99–110)
CO2 SERPL-SCNC: 24 MMOL/L (ref 21–32)
CREAT SERPL-MCNC: 1.1 MG/DL (ref 0.8–1.3)
DEPRECATED RDW RBC AUTO: 16 % (ref 12.4–15.4)
EOSINOPHIL # BLD: 0.4 K/UL (ref 0–0.6)
EOSINOPHIL NFR BLD: 3 %
GFR SERPLBLD CREATININE-BSD FMLA CKD-EPI: 68 ML/MIN/{1.73_M2}
GLUCOSE SERPL-MCNC: 100 MG/DL (ref 70–99)
HCT VFR BLD AUTO: 32.9 % (ref 40.5–52.5)
HGB BLD-MCNC: 11.3 G/DL (ref 13.5–17.5)
LYMPHOCYTES # BLD: 1.1 K/UL (ref 1–5.1)
LYMPHOCYTES NFR BLD: 9 %
MACROCYTES BLD QL SMEAR: ABNORMAL
MCH RBC QN AUTO: 34.5 PG (ref 26–34)
MCHC RBC AUTO-ENTMCNC: 34.2 G/DL (ref 31–36)
MCV RBC AUTO: 100.9 FL (ref 80–100)
MONOCYTES # BLD: 0.5 K/UL (ref 0–1.3)
MONOCYTES NFR BLD: 4 %
NEUTROPHILS # BLD: 9.7 K/UL (ref 1.7–7.7)
NEUTROPHILS NFR BLD: 81 %
OVALOCYTES BLD QL SMEAR: ABNORMAL
PATH INTERP BLD-IMP: NO
PLATELET # BLD AUTO: 959 K/UL (ref 135–450)
PLATELET BLD QL SMEAR: ABNORMAL
PMV BLD AUTO: 7.6 FL (ref 5–10.5)
POIKILOCYTOSIS BLD QL SMEAR: ABNORMAL
POLYCHROMASIA BLD QL SMEAR: ABNORMAL
POTASSIUM SERPL-SCNC: 4.1 MMOL/L (ref 3.5–5.1)
PROT SERPL-MCNC: 6.3 G/DL (ref 6.4–8.2)
RBC # BLD AUTO: 3.26 M/UL (ref 4.2–5.9)
SCHISTOCYTES BLD QL SMEAR: ABNORMAL
SLIDE REVIEW: ABNORMAL
SMUDGE CELLS BLD QL SMEAR: PRESENT
SODIUM SERPL-SCNC: 132 MMOL/L (ref 136–145)
SODIUM SERPL-SCNC: 133 MMOL/L (ref 136–145)
TOXIC GRANULES BLD QL SMEAR: PRESENT
WBC # BLD AUTO: 12 K/UL (ref 4–11)

## 2025-03-21 PROCEDURE — 6370000000 HC RX 637 (ALT 250 FOR IP): Performed by: HOSPITALIST

## 2025-03-21 PROCEDURE — 6370000000 HC RX 637 (ALT 250 FOR IP): Performed by: INTERNAL MEDICINE

## 2025-03-21 PROCEDURE — 6360000004 HC RX CONTRAST MEDICATION: Performed by: INTERNAL MEDICINE

## 2025-03-21 PROCEDURE — 85025 COMPLETE CBC W/AUTO DIFF WBC: CPT

## 2025-03-21 PROCEDURE — 94760 N-INVAS EAR/PLS OXIMETRY 1: CPT

## 2025-03-21 PROCEDURE — 2500000003 HC RX 250 WO HCPCS: Performed by: HOSPITALIST

## 2025-03-21 PROCEDURE — 84295 ASSAY OF SERUM SODIUM: CPT

## 2025-03-21 PROCEDURE — 80053 COMPREHEN METABOLIC PANEL: CPT

## 2025-03-21 PROCEDURE — 36415 COLL VENOUS BLD VENIPUNCTURE: CPT

## 2025-03-21 PROCEDURE — 97110 THERAPEUTIC EXERCISES: CPT

## 2025-03-21 PROCEDURE — 74177 CT ABD & PELVIS W/CONTRAST: CPT

## 2025-03-21 PROCEDURE — 97530 THERAPEUTIC ACTIVITIES: CPT

## 2025-03-21 RX ORDER — HYDROXYUREA 500 MG/1
1000 CAPSULE ORAL DAILY
Qty: 60 CAPSULE | Refills: 1 | Status: SHIPPED | OUTPATIENT
Start: 2025-03-22 | End: 2025-05-21

## 2025-03-21 RX ORDER — IOPAMIDOL 612 MG/ML
50 INJECTION, SOLUTION INTRAVASCULAR
Status: COMPLETED | OUTPATIENT
Start: 2025-03-21 | End: 2025-03-21

## 2025-03-21 RX ORDER — SODIUM CHLORIDE 1 G/1
1 TABLET ORAL 2 TIMES DAILY WITH MEALS
Qty: 60 TABLET | Refills: 0 | Status: SHIPPED | OUTPATIENT
Start: 2025-03-21

## 2025-03-21 RX ORDER — IOPAMIDOL 755 MG/ML
75 INJECTION, SOLUTION INTRAVASCULAR
Status: COMPLETED | OUTPATIENT
Start: 2025-03-21 | End: 2025-03-21

## 2025-03-21 RX ORDER — IOPAMIDOL 612 MG/ML
50 INJECTION, SOLUTION INTRAVASCULAR
Status: DISCONTINUED | OUTPATIENT
Start: 2025-03-21 | End: 2025-03-21

## 2025-03-21 RX ADMIN — MIDODRINE HYDROCHLORIDE 5 MG: 5 TABLET ORAL at 12:04

## 2025-03-21 RX ADMIN — HYDROXYUREA 1000 MG: 500 CAPSULE ORAL at 08:10

## 2025-03-21 RX ADMIN — RIVAROXABAN 20 MG: 20 TABLET, FILM COATED ORAL at 08:10

## 2025-03-21 RX ADMIN — Medication 1 G: at 08:10

## 2025-03-21 RX ADMIN — IOPAMIDOL 50 ML: 612 INJECTION, SOLUTION INTRAVENOUS at 09:10

## 2025-03-21 RX ADMIN — ONDANSETRON 4 MG: 4 TABLET, ORALLY DISINTEGRATING ORAL at 01:59

## 2025-03-21 RX ADMIN — SODIUM CHLORIDE, PRESERVATIVE FREE 10 ML: 5 INJECTION INTRAVENOUS at 08:11

## 2025-03-21 RX ADMIN — IOPAMIDOL 75 ML: 755 INJECTION, SOLUTION INTRAVENOUS at 10:40

## 2025-03-21 NOTE — PROGRESS NOTES
Patient discharging today. IV removed, no complications, dressing applied. Paperwork reviewed with patient and son including medications and follow up appointments. All questions answered. Patient and belongings being transported to car with son.

## 2025-03-21 NOTE — PROGRESS NOTES
NO new inpt heme recs  We will reschedule his outpt fu appt -  Dc on hydrea   Will sign off inpt -reconsutl if needed

## 2025-03-21 NOTE — PROGRESS NOTES
Occupational Therapy    [facility] - Occupational Therapy   Phone: [phone]    Occupational Therapy    [] Initial Evaluation            [x] Daily Treatment Note         [] Discharge Summary      Patient: Maxx Norman   : 1945   MRN: [id number]   Date of Service:  3/21/2025    Staff Mobility Recommendation: RWx1    AM-PAC Score:   Discharge Recommendations: HHOT    Admitting Diagnosis:  Hyponatremia  Ordering Physician: Jonah Newman MD   Current Admission Summary: per ED note, Maxx Norman is a 79 y.o. male who presents with weakness and fatigue.  He also has slurred speech has been present for over a week but got worse over the past 48 hours.  He denies any vertigo.  He denies any focal weakness.  He had abnormalities on his CBC and was seen by hematology oncology which was an elevated white count and abnormal platelets.  He also had abnormal liver functions and scheduled to see a liver doctor.  He has started with hiccups 3 days ago.  They have gotten progressively worse.  He has a history of prolonged hiccups in the past but never this bad and never been treated for it.  His son states that he is unable to perform his activities of daily living.  Normally he can dress to self and feed himself but today he could not get out of bed to get dressed to go to an appointment with Dr. Reyes, cardiologist.  He was recently started on diuretics for swelling of his bilateral lower extremities.   Past Medical History:  has a past medical history of Atrial fibrillation (HCC), Basal cell carcinoma, Bilateral tinnitus, Bradycardia, CAD (coronary artery disease), GERD (gastroesophageal reflux disease), Hyperlipidemia, Hypertension, and Osteoarthritis.  Past Surgical History:  has a past surgical history that includes Coronary angioplasty with stent (); External ear surgery (Right); Skin cancer destruction (2017); A-V cardiac pacemaker insertion; ep device procedure (N/A, 2024); and CT  reach, gait belt, and nurse notified    Plan  Frequency: 3-5 x/week  Current Treatment Recommendations: strengthening, balance training, functional mobility training, transfer training, endurance training, patient/caregiver education, and ADL/self-care training    Goals  Patient Goals: return home   Short Term Goals:  Time Frame: prior to D/C  Patient will complete upper body ADL at modified independent   Patient will complete lower body ADL at modified independent   Patient will complete toileting at modified independent   Patient will complete grooming at modified independent   Patient will complete functional transfers at modified independent   Patient will complete functional mobility at modified independent     Above goals reviewed on 3/21/2025.  All goals are ongoing at this time unless indicated above.     Therapy Session Time:     Therapy Time       Individual Co-treatment   Time In 1055     Time Out 1120     Minutes 25        Electronically signed by LEILA Borrego on 3/21/2025 at 11:47 AM

## 2025-03-21 NOTE — PLAN OF CARE
Problem: Chronic Conditions and Co-morbidities  Goal: Patient's chronic conditions and co-morbidity symptoms are monitored and maintained or improved  Outcome: Progressing     Problem: Discharge Planning  Goal: Discharge to home or other facility with appropriate resources  Outcome: Progressing     Problem: Pain  Goal: Verbalizes/displays adequate comfort level or baseline comfort level  Outcome: Progressing     Problem: Safety - Adult  Goal: Free from fall injury  Outcome: Progressing     Problem: Skin/Tissue Integrity  Goal: Skin integrity remains intact  Description: 1.  Monitor for areas of redness and/or skin breakdown  2.  Assess vascular access sites hourly  3.  Every 4-6 hours minimum:  Change oxygen saturation probe site  4.  Every 4-6 hours:  If on nasal continuous positive airway pressure, respiratory therapy assess nares and determine need for appliance change or resting period  Outcome: Progressing     Problem: Neurosensory - Adult  Goal: Achieves stable or improved neurological status  Outcome: Progressing  Goal: Achieves maximal functionality and self care  Outcome: Progressing     Problem: Respiratory - Adult  Goal: Achieves optimal ventilation and oxygenation  Outcome: Progressing     Problem: Cardiovascular - Adult  Goal: Maintains optimal cardiac output and hemodynamic stability  Outcome: Progressing  Goal: Absence of cardiac dysrhythmias or at baseline  Outcome: Progressing     Problem: Skin/Tissue Integrity - Adult  Goal: Skin integrity remains intact  Description: 1.  Monitor for areas of redness and/or skin breakdown  2.  Assess vascular access sites hourly  3.  Every 4-6 hours minimum:  Change oxygen saturation probe site  4.  Every 4-6 hours:  If on nasal continuous positive airway pressure, respiratory therapy assess nares and determine need for appliance change or resting period  Outcome: Progressing  Goal: Oral mucous membranes remain intact  Outcome: Progressing     Problem:

## 2025-03-21 NOTE — PROGRESS NOTES
V2.0  [facility] Hospitalist Progress Note      Name:  Maxx Norman /Age/Sex: 1945  (79 y.o. male)   MRN & CSN:  [id number] & [id number] Encounter Date/Time: 3/21/2025 1:05 PM EDT    Location:  [id number] PCP: Michelle Humphries MD       Hospital Day: 9  Subjective:   Chief Complaint: Follow-up altered mental status    Seen and evaluated the bedside, mentation is much better, denies any new complaints    Review of Systems:    Review of Systems    10 point ROS negative except as stated above in \"subjective\" section    Objective:     Intake/Output Summary (Last 24 hours) at 3/21/2025 1305  Last data filed at 3/21/2025 1153  Gross per 24 hour   Intake 540 ml   Output 1500 ml   Net -960 ml      Vitals:   Vitals:    25 1159   BP: (!) 111/56   Pulse: 64   Resp: 19   Temp: 97.3 °F (36.3 °C)   SpO2: 100%     Physical Exam:   General: Awake, alert and following commands  Cardiovascular: S1S2 present, regular rate and rhythm, no murmurs  Respiratory: Clear to auscultation  Gastrointestinal: Soft, non tender, positive bowel sounds   Genitourinary: no suprapubic tenderness  Musculoskeletal: No edema    Medications:     Medications:    sodium chloride  1 g Oral BID WC    hydroxyurea  1,000 mg Oral Daily    midodrine  5 mg Oral TID WC    sodium chloride flush  5-40 mL IntraVENous 2 times per day    rivaroxaban  20 mg Oral Daily    atorvastatin  80 mg Oral Nightly    [Held by provider] tamsulosin  0.4 mg Oral Daily      Infusions:    sodium chloride       PRN Meds: melatonin, 3 mg, Nightly PRN  sodium chloride flush, 5-40 mL, PRN  sodium chloride, , PRN  potassium chloride, 40 mEq, PRN   Or  potassium alternative oral replacement, 40 mEq, PRN   Or  potassium chloride, 10 mEq, PRN  magnesium sulfate, 2,000 mg, PRN  ondansetron, 4 mg, Q8H PRN   Or  ondansetron, 4 mg, Q6H PRN  polyethylene glycol, 17 g, Daily PRN  acetaminophen, 650 mg, Q6H PRN   Or  acetaminophen, 650 mg, Q6H PRN  haloperidol lactate, 1 mg, Q4H  Albumin/Globulin Ratio 1.4 1.1 - 2.2    Total Bilirubin 2.3 (H) 0.0 - 1.0 mg/dL    Alkaline Phosphatase 178 (H) 40 - 129 U/L    ALT 57 (H) 10 - 40 U/L    AST 78 (H) 15 - 37 U/L        Imaging/Diagnostics Last 24 Hours   CT ABDOMEN PELVIS W IV CONTRAST Additional Contrast? Oral    1. No acute abdominal or pelvic finding. 2. Stable hepatic and renal cysts. 3. No change compared to the prior study.     CT BIOPSY BONE MARROW  Result Date: 3/17/2025    Successful CT guided bone marrow aspiration and core biopsy of the right iliac bone.       Assessment and Plan:   Maxx Norman is a 79 y.o. male     Conditions under treatment:    # Hyponatremia  # Acute toxic metabolic encephalopathy  # Atrial fibrillation, paroxysmal  # Abnormal liver function test  # CML  # Hypertension    Plan:    -Patient is status post 3% saline, hyponatremia felt to be of multifactorial etiology, currently off all diuretics including torsemide, chlorthalidone and Aldactone.  On salt tablets, sodium is improved    -Altered mentation likely related to hyponatremia, mentation is much better    -Noted to have abnormal LFTs, GI consulted, CT scan of the abdomen pelvis requested, and that is negative, marginally worse today, continue to monitor, avoid hepatotoxic medications, hydroxyurea can cause abnormal LFTs,, will monitor trend    -Continue medications for atrial fibrillation, on anticoagulation    -Status post bone marrow biopsy, findings suggestive of CML, on hydroxyurea, will follow-up with hematology as outpatient    Personally reviewed Lab Studies and Imaging     Telemetry strip reviewed and interpreted personally and results paced rhythm    Imaging that was interpreted personally includes CT Abdo and results no acute process    Electronically signed by Alfredo Mantilla MD on 3/21/2025 at 1:05 PM    This not was generated completely or in part using Dragon, speech recognition software, please excuse any inaccuracies or misstatements.

## 2025-03-21 NOTE — CARE COORDINATION
Discharge Summary:    DME order for walker as OP noted. Call placed to [facility] to inform. RW to be delivered to patient's bedside prior to patient departure.    At this time, patient plans to return home with family support and HHC. CM to continue to follow for updates.    Electronically signed by SHAY BENSON RN on 3/21/2025 at 9:05 AM

## 2025-03-21 NOTE — DISCHARGE INSTRUCTIONS
We do not need to take midodrine at discharge, your diuretics have remained on hold at discharge, please be on 1.5 L/day fluid restriction, continue with salt tablets, also your hydroxyurea dose has been increased.  Please follow-up with nephrology, hematology and gastroenterology as directed

## 2025-03-21 NOTE — CARE COORDINATION
CASE MANAGEMENT DISCHARGE SUMMARY:    DISCHARGE DATE: 3/21/2025    DISCHARGED TO: home     Discharging to Facility/ Agency   Name: [facility]  Address:   [address] [place] [address] [place] [address]  Phone:  [phone]  Fax:  [phone]     TRANSPORTATION: via son             TIME: TBD by patient and bedside RN    COMMENTS: Patient, patient's son (Benito), bedside RN, and home healthcare aware of discharge plan and timeline.    Electronically signed by SHAY BENSON RN on 3/21/2025 at 2:08 PM      Per BRANDYN Lira with [facility], will f/u with patient/family on Monday to discuss HHC further as patient is currently refusing. HHC holding referral for now.    Electronically signed by SHAY BENSON RN on 3/21/2025 at 2:11 PM    Per bedside RN, [facility] has delivered patient's RW to bedside per physician order. No additional dc needs noted at this time.    Electronically signed by SHAY BENSON RN on 3/21/2025 at 2:11 PM

## 2025-03-21 NOTE — PROGRESS NOTES
Comprehensive Nutrition Assessment    Type and Reason for Visit:  Initial, LOS    Nutrition Recommendations/Plan:   Continue regular diet; 1500 ml  Continue to monitor patients po intake for any needed changes     Malnutrition Assessment:  Malnutrition Status:  At risk for malnutrition (03/21/25 0954)    Context:  Acute Illness     Findings of the 6 clinical characteristics of malnutrition:  Energy Intake:  Mild decrease in energy intake  Weight Loss:  Greater than 2% over 1 week (Significant weight lost)     Body Fat Loss:  No body fat loss     Muscle Mass Loss:  No muscle mass loss    Fluid Accumulation:  No fluid accumulation     Strength:  Not Performed    Nutrition Assessment:    Per progress notes patient presented to the ED with weakness, slurred speech, and poor appetite. Patient has a pmh of A-fib, CAD, GERD, HTN, and Basal cell carcinoma. On 03/17/2025 patient underwent a bone marrown biopsy. Patient is on a regular diet with 1500 ml fluid restriction. Per EHR patient consumed 26-50% meals. During assessment patient reports he ate breakfast but did not eat lunch today because he was not hungry. Tray was on table untouched. Patients states he maybe discharging today. Patient states prior to admission appetite was \"not good\". He was not able to provide what he normally ate throughout his day prior to admission nor his usual body weight.  Per weight history patient lost 15lbs (8% lost in a week) which is significant. Will continue to monitor patients po intake for any needed changes.    Nutrition Related Findings:    Labs (Na 133 L), (Glucose 100 H). Oriented x 3. 3/108774 BM. No edema Wound Type: Surgical Incision (Lower back)       Current Nutrition Intake & Therapies:    Average Meal Intake: 26-50%     ADULT DIET; Regular; 1500 ml    Anthropometric Measures:  Height: 188 cm (6' 2.02\")  Ideal Body Weight (IBW): 190 lbs (86 kg)       Current Body Weight: 77.9 kg (171 lb 11.8 oz), 90.4 % IBW. Weight  Source: Standing scale  Current BMI (kg/m2): 22           Weight Adjustment For: No Adjustment                 BMI Categories: Normal Weight (BMI 22.0 to 24.9) age over 65    Estimated Daily Nutrient Needs:  Energy Requirements Based On: Kcal/kg  Weight Used for Energy Requirements: Current  Energy (kcal/day): 8477-0786 kcal using (20-25 kcal/kg/day)  Weight Used for Protein Requirements: Current  Protein (g/day): 62-78 g using (0.8-1 g/kg)  Method Used for Fluid Requirements: Defer to provider  Fluid (ml/day):      Nutrition Diagnosis:   Increased nutrient needs related to increase demand for energy/nutrients as evidenced by wounds    Nutrition Interventions:   Food and/or Nutrient Delivery: Continue Current Diet  Nutrition Education/Counseling: No recommendation at this time  Coordination of Nutrition Care: Continue to monitor while inpatient       Goals:  Goals: PO intake 75% or greater, by next RD assessment  Type of Goal: New goal       Nutrition Monitoring and Evaluation:   Behavioral-Environmental Outcomes: None Identified  Food/Nutrient Intake Outcomes: Food and Nutrient Intake  Physical Signs/Symptoms Outcomes: Nutrition Focused Physical Findings, Skin, Biochemical Data, Weight    Discharge Planning:    Too soon to determine     Tammi Chavira  Contact: [phone]

## 2025-03-21 NOTE — DISCHARGE SUMMARY
with the administration of intravenous contrast. Multiplanar reformatted images are provided for review. Automated exposure control, iterative reconstruction, and/or weight based adjustment of the mA/kV was utilized to reduce the radiation dose to as low as reasonably achievable. COMPARISON: 6 March 2025 HISTORY: ORDERING SYSTEM PROVIDED HISTORY: abnormal LFTS TECHNOLOGIST PROVIDED HISTORY: Reason for exam:->abnormal LFTS Additional Contrast?->Oral Reason for Exam: abnormal LFTs hx skin ca FINDINGS: Lower Chest: Normal bronchovascular markings. No effusion, pneumothorax or airspace process.  Mild cardiac enlargement with pacer wires and moderate atherosclerotic changes of the coronary arteries and mild atherosclerotic changes of the aorta. Organs: Liver: Stable left hepatic lobe cyst. Gallbladder: Normal Pancreas: Mild diffuse atrophy. Adrenal glands: Normal Kidneys: Stable left lower pole renal cyst.  No stones or obstruction. Spleen: Calcified granuloma. GI/Bowel: Stomach: Unremarkable Small bowel: Unremarkable. Colon: Unremarkable. Pelvis: Prostate gland and seminal vesicles are normal.  The bladder is normal. Peritoneum/Retroperitoneum: No free fluid or free air.  No adenopathy by size criteria.  Interval resolution of the previously noted ascites. Bones/Soft Tissues: Degenerative changes throughout the spine.     1. No acute abdominal or pelvic finding. 2. Stable hepatic and renal cysts. 3. No change compared to the prior study.     CT BIOPSY BONE MARROW  Result Date: 3/17/2025  PROCEDURE: CT GUIDED BONE MARROW ASPIRATION AND CORE NEEDLE BONE BIOPSY MODERATE CONSCIOUS SEDATION 3/17/2025 HISTORY: ORDERING SYSTEM PROVIDED HISTORY: cml TECHNOLOGIST PROVIDED HISTORY: Reason for exam:->cml Reason for Exam: cml PHYSICIANS: Alexis Cortez M.D. SEDATION: After establishing stable IV access, supplemental oxygen was administered via nasal cannula. SaO2, BP, cardiac rhythm, and respiratory rate were monitored  continuously. The patient was monitored by an independent, trained nurse for the entire procedure. An immediate reassessment was completed prior to sedation, and it was determined to be safe to proceed. Conscious sedation was then achieved by repetitive sequential administration of small IV doses of Versed and fentanyl.  Sedation was titrated so that adequate sedation, analgesia, and amnesia were achieved while protective reflexes were preserved.  Conscious sedation was well tolerated and there were no apparent complications.  After completion of the procedure, the patient was monitored for an appropriate period.  Sedation time was 8 minutes. Versed given: 2 mg IV Fentanyl given: 100 mcg IV TECHNIQUE: Informed consent was obtained following a detailed explanation of the procedure including risks, benefits, and alternatives. Universal protocol was followed. Sterile gowns, masks, hats and gloves utilized for maximal sterile barrier. Axial images were obtained through the iliac bones using CT guidance and a suitable skin site was prepped and draped in sterile fashion. Local anesthesia was achieved with lidocaine. An 11 gauge Vgift bone marrow biopsy needle was advanced into the right iliac bone and approximately 12 mL of bone marrow aspirate was obtained. A single core biopsy specimen was obtained and the patient tolerated the procedure well. Estimated blood loss: <5 mL Dose modulation, iterative reconstruction, and/or weight based adjustment of the mA/kV was utilized to reduce the radiation dose to as low as reasonably achievable. Total exam DLP: 332.79 mGy-cm     Successful CT guided bone marrow aspiration and core biopsy of the right iliac bone.       CBC:   Recent Labs     03/21/25  0542   WBC 12.0*   HGB 11.3*   *     BMP:    Recent Labs     03/19/25  0348 03/19/25  1136 03/20/25  0521 03/20/25  0809 03/20/25  1118 03/20/25  2044 03/21/25  0542 03/21/25  1123   *   < > 128*  --    < > 130* 133*

## 2025-03-21 NOTE — PROGRESS NOTES
Nephrology Progress Note   [email or web address]      Chief Complaint: Altered mental status    History of Present Illness: Patient is lethargic - history obtained from patients son and chart    Mr Norman is an 79 male with a past medical history significant for Hypertension, Hyperlipidemia, CML, CAD (stent LAD in ), A Fib, bradycardia s/p PPM and recent hospitalization at [facility] from 3/4/25 to 3/6/25 with volume overload that presented to the ED with altered mental status    Son stated he went to  his father to take to his Cardiology appointment and realized his father was not dressed appeared confused and could not follow instructions and that's when he decided to get to the ED. Upon arrival blood work revealed a serum sodium of 106 meq/L. He was also noted to be retaining urine so a james was inserted    Patient is making quite a bit of urine. He remains confused thrashing in bed. Nephrology consulted for critical life threatening Hyponatremia    Subjective:    Feels better , labs reviewed     ROS: no chest pain. No shortness of breath    Scheduled Meds:   sodium chloride  1 g Oral BID WC    hydroxyurea  1,000 mg Oral Daily    midodrine  5 mg Oral TID WC    sodium chloride flush  5-40 mL IntraVENous 2 times per day    rivaroxaban  20 mg Oral Daily    atorvastatin  80 mg Oral Nightly    [Held by provider] tamsulosin  0.4 mg Oral Daily        sodium chloride         PRN Meds:.melatonin, sodium chloride flush, sodium chloride, potassium chloride **OR** potassium alternative oral replacement **OR** potassium chloride, magnesium sulfate, ondansetron **OR** ondansetron, polyethylene glycol, acetaminophen **OR** acetaminophen, haloperidol lactate    Physical Exam:    TEMPERATURE:  Current - Temp: 97.3 °F (36.3 °C); Max - Temp  Av.9 °F (36.6 °C)  Min: 97.3 °F (36.3 °C)  Max: 98.4 °F (36.9 °C)  RESPIRATIONS RANGE: Resp  Av.8  Min: 16  Max: 20  PULSE RANGE: Pulse  Av.8  Min: 61  Max: 65  BLOOD  03/20/2025 05:21 AM     U/A:    Lab Results   Component Value Date/Time    NITRITE neg 09/19/2011 12:00 AM    COLORU Yellow 03/18/2025 02:53 PM    PHUR 6.0 03/18/2025 02:53 PM    PHUR 7.5 05/26/2022 02:30 AM    WBCUA 4 03/18/2025 02:53 PM    RBCUA 1 03/18/2025 02:53 PM    BACTERIA None Seen 03/18/2025 02:53 PM    CLARITYU Clear 03/18/2025 02:53 PM    SPECGRAV 1.015 09/19/2011 12:00 AM    LEUKOCYTESUR SMALL 03/18/2025 02:53 PM    UROBILINOGEN 4.0 03/18/2025 02:53 PM    BILIRUBINUR Negative 03/18/2025 02:53 PM    BLOODU Negative 03/18/2025 02:53 PM    GLUCOSEU Negative 03/18/2025 02:53 PM         IMPRESSION/RECOMMENDATIONS:      Principal Problem:    Hyponatremia  Resolved Problems:    * No resolved hospital problems. *      Hyponatremia - Etiology likely multifactorial (Thiazide diuretic/Volume depletion/Polydipsia/poor appetite and as a result probable low solute intake)  - off  all diuretics (patient was on Torsemide/Chlorthalidone and Aldactone)  - S/p 3 % saline on 3/16/25  - Initial U osm 220; U Na 73  - Repeat U Osm 596 and U Na 142 on 3/16/25 consistent with SIADH  - Continue FR 1.5 L / day .   Na 132 On   sodium chloride tablets     2. Altered mental status likely secondary to No 1 - Improving    3. HTN - controlled    4. New diagnosis of CML  S.p BM  per Onc  .    Advised to f/u with PCP with labs check .  F/u with Neph in 1-2 wks   Check labs .     Paty Angeles MD,FACP

## 2025-03-22 NOTE — PROGRESS NOTES
Physician Progress Note      PATIENT:               SHABBIR FITZGERALD  CSN #:                  [id number]  :                       1945  ADMIT DATE:       3/13/2025 12:22 PM  DISCH DATE:        3/21/2025 2:55 PM  RESPONDING  PROVIDER #:        Rodrigo Egan MD          QUERY TEXT:    Pt admitted with hyponatremia. Noted documentation of \"Repeat U Osm 596 and U   Na 142 on 3/16/25 consistent with SIADH.\" If possible, please document in   progress notes and discharge summary:    The medical record reflects the following:  Risk Factors: 79 year old male  Clinical Indicators: 3/13 Plasma Sodium: 106, 3/18 Plasma osmolality: 281,   3/13 Urine osmolality: 220,  3/13   Urinary Sodium: 73  Nephrology 3/18: \"Hyponatremia - Etiology likely multifactorial (Thiazide   diuretic/Volume depletion/Polydipsia/poor appetite and as a result probable   low solute intake). Repeat U Osm 596 and U Na 142 on 3/16/25 consistent with   SIADH.\"  IM 3/19: \"Hyponatremia, status post 3% saline during this admission nephrology   following on fluid restriction at this time improving.\"  Treatment: 3% saline IVF, FR 1.5L/day, Sodium chloride tablets, Nephrology   consult  Options provided:  -- SIADH confirmed present on admission  -- Hyponatremia, SIADH ruled out  -- Defer to nephrologist consultant documentation regarding SIADH.  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    Query created by: Itzel Torres on 3/19/2025 1:41 PM      QUERY TEXT:    Pt admitted with hyponatremia. Pt noted to have \"Altered mental status with   encephalopathy.\" If possible, please document in the progress notes and   discharge summary if you are evaluating and / or treating any of the   following:    The medical record reflects

## 2025-03-24 ENCOUNTER — CARE COORDINATION (OUTPATIENT)
Dept: CARE COORDINATION | Age: 80
End: 2025-03-24

## 2025-03-24 NOTE — CARE COORDINATION
Care Transitions Note    Initial Call - Call within 2 business days of discharge: Yes    Patient Current Location:  Home: [address] [place] [address] [place] [address]    Care Transition Nurse contacted the spouse/partner  by telephone to perform post hospital discharge assessment, verified name and  as identifiers.  Provided introduction to self, and explanation of the Care Transition Nurse role.    Patient: Maxx Norman    Patient : 1945   MRN: [id number]    Reason for Admission: hyponatremia  Discharge Date: 3/21/25  RURS: Readmission Risk Score: 19.3      Last Discharge Facility       Date Complaint Diagnosis Description Type Department Provider    3/13/25 Fatigue Hyponatremia ... ED to Hosp-Admission (Discharged) (ADMITTED) [facility] 5W Alfredo Mantilla MD; Javier Hines...            Was this an external facility discharge? No    Additional needs identified to be addressed with provider   No needs identified             Method of communication with provider: none.    Patients top risk factors for readmission: medical condition-     Interventions to address risk factors:   Education:    Review of patient management of conditions/medications:      Care Summary Note:  States pt is doing well. Denies any ongoing symptoms or concerns. Reports mental status is at baseline. Denies swelling, CP, palpitations, SOB or any other concerns. Reports appetite has improved and fluid intake is adequate. Pt does not have BP cuff at home. States he's taking all medications as prescribed. Son Benito manages medications and is at work right now. States Oliver also will schedule f/u appts today. Denies questions or needs at this time.    Care Transition Nurse reviewed discharge instructions, medical action plan, and red flags with spouse/partner. The spouse/partner was given an opportunity to ask questions; all questions answered at this time.. The spouse/partner verbalized understanding.   Were discharge instructions available

## 2025-03-25 ENCOUNTER — CARE COORDINATION (OUTPATIENT)
Dept: CASE MANAGEMENT | Age: 80
End: 2025-03-25

## 2025-03-25 DIAGNOSIS — I50.33 ACUTE ON CHRONIC HEART FAILURE WITH PRESERVED EJECTION FRACTION (HCC): Primary | ICD-10-CM

## 2025-03-25 LAB
Lab: NORMAL
REPORT: NORMAL
THIS TEST SENT TO: NORMAL

## 2025-03-25 PROCEDURE — 1111F DSCHRG MED/CURRENT MED MERGE: CPT | Performed by: INTERNAL MEDICINE

## 2025-03-25 NOTE — CARE COORDINATION
Care Transitions Note    Follow Up Call     Patient: Maxx Norman                             Patient : 1945   MRN: [id number]                             Reason for Admission: hyponatremia  Discharge Date: 3/21/25       RURS: Readmission Risk Score: 19.3    Patient Current Location:  Home: [id number] [place] [address] [place] [address]    Care Transition Nurse contacted the familyBenito, (PHI form verified; on file) by telephone. Verified name and  as identifiers.    Additional needs identified to be addressed with provider   Standard priority: does not have nitro at home. Please rx if needed.          Method of communication with provider:  note added to HFU OB .    Care Summary Note:   Completed med review with son Benito. The hydroxyurea was increased from twice weekly to BID. States there is a new med likely being added but unsure if it replaces this med or not.     Has appt at PCP Thursday. Son taking him and will get labwork after for nephrology OV next week on 4/3. Sees [facility] Friday.      CHF education:  -if you take a water pill - do not skip doses  -weigh daily in the morning at the same time and in the same clothing  -report weight gain of >3#/day or >5#/week  -report increased SOB, edema, abd fullness, difficulty lying flat  -report palpitations, cough, difficulty urinating  -reviewed salt restrictions (less than 2000mg/day) and fluid restrictions (50 ounces/day)    Sees [facility] Friday, PCP Thursday.    Stressed importance of daily weights. States he is up and ambulatory with cane to mailbox, energy is good.     Son is working on getting them resources through [employer].     Plan of care updates since last contact:  Review of patient management of conditions/medications:       Advance Care Planning:   Does patient have an Advance Directive:     Primary Decision Maker: Benito Norman - Child - [phone]    Secondary Decision Maker: Alexus Braga - Spouse - [phone] .    Medication

## 2025-03-27 ENCOUNTER — OFFICE VISIT (OUTPATIENT)
Dept: FAMILY MEDICINE CLINIC | Age: 80
End: 2025-03-27

## 2025-03-27 VITALS
DIASTOLIC BLOOD PRESSURE: 62 MMHG | SYSTOLIC BLOOD PRESSURE: 122 MMHG | OXYGEN SATURATION: 99 % | HEIGHT: 74 IN | HEART RATE: 65 BPM | WEIGHT: 187 LBS | BODY MASS INDEX: 24 KG/M2 | TEMPERATURE: 98 F

## 2025-03-27 DIAGNOSIS — R79.89 ELEVATED LFTS: ICD-10-CM

## 2025-03-27 DIAGNOSIS — Z09 HOSPITAL DISCHARGE FOLLOW-UP: ICD-10-CM

## 2025-03-27 DIAGNOSIS — C92.10 CML (CHRONIC MYELOCYTIC LEUKEMIA) (HCC): ICD-10-CM

## 2025-03-27 DIAGNOSIS — E87.1 HYPONATREMIA: Primary | ICD-10-CM

## 2025-03-27 DIAGNOSIS — I48.91 ATRIAL FIBRILLATION WITH SLOW VENTRICULAR RESPONSE (HCC): ICD-10-CM

## 2025-03-27 DIAGNOSIS — R60.1 GENERALIZED EDEMA: ICD-10-CM

## 2025-03-27 RX ORDER — NITROGLYCERIN 0.4 MG/1
0.4 TABLET SUBLINGUAL EVERY 5 MIN PRN
Qty: 25 TABLET | Status: CANCELLED | OUTPATIENT
Start: 2025-03-27

## 2025-03-27 NOTE — PROGRESS NOTES
Risk Score On File     Non face to face  following discharge, date last encounter closed (first attempt may have been earlier): 03/24/2025    Call initiated 2 business days of discharge: Yes    ASSESSMENT/PLAN:   Hyponatremia  -     Basic Metabolic Panel; Future  CML (chronic myelocytic leukemia) (HCC)  -     CBC with Auto Differential; Future  Elevated LFTs  -     Hepatic Function Panel; Future  Generalized edema  Atrial fibrillation with slow ventricular response (HCC)  Hospital discharge follow-up  -     CA DISCHARGE MEDS RECONCILED W/ CURRENT OUTPATIENT MED LIST      Medical Decision Making: high complexity  No follow-ups on file.           Subjective:   HPI:  Follow up of Hospital problems/diagnosis(es): hyponatremia  edema   low protein  CML     Inpatient course: Discharge summary reviewed- see chart.    Interval history/Current status: improved  still weak  able to do okay at his home     Patient Active Problem List   Diagnosis    Hypercholesterolemia    Benign hypertension    CAD (coronary artery disease)    CAD (coronary artery disease)    BCC (basal cell carcinoma of skin)    Vision changes    Occipital infarction (HCC)    Hearing loss of both ears    Subjective tinnitus of both ears    Neck mass    Vision loss of right eye    Atrial fibrillation with slow ventricular response (HCC)    Former tobacco use    Acute bacterial sinusitis    Bradycardia with 31-40 beats per minute    Pacemaker    Essential hypertension    Peripheral vertigo    Vertigo    SSS (sick sinus syndrome) (HCC)    Localized edema    Acute on chronic heart failure with preserved ejection fraction (HCC)    Hyponatremia       Medications listed as ordered at the time of discharge from hospital     Medication List            Accurate as of March 27, 2025 11:59 PM. If you have any questions, ask your nurse or doctor.                CONTINUE taking these medications      atorvastatin 40 MG tablet  Commonly known as: LIPITOR  TAKE 1 TABLET BY

## 2025-03-28 DIAGNOSIS — C92.10 CML (CHRONIC MYELOCYTIC LEUKEMIA) (HCC): ICD-10-CM

## 2025-03-28 DIAGNOSIS — E87.1 HYPONATREMIA: ICD-10-CM

## 2025-03-28 DIAGNOSIS — R79.89 ELEVATED LFTS: ICD-10-CM

## 2025-03-28 LAB
ALBUMIN SERPL-MCNC: 4.1 G/DL (ref 3.4–5)
ALP SERPL-CCNC: 161 U/L (ref 40–129)
ALT SERPL-CCNC: 44 U/L (ref 10–40)
ANION GAP SERPL CALCULATED.3IONS-SCNC: 8 MMOL/L (ref 3–16)
AST SERPL-CCNC: 44 U/L (ref 15–37)
BASOPHILS # BLD: 0.1 K/UL (ref 0–0.2)
BASOPHILS NFR BLD: 2.2 %
BILIRUB DIRECT SERPL-MCNC: 1 MG/DL (ref 0–0.3)
BILIRUB INDIRECT SERPL-MCNC: 0.9 MG/DL (ref 0–1)
BILIRUB SERPL-MCNC: 1.9 MG/DL (ref 0–1)
BUN SERPL-MCNC: 15 MG/DL (ref 7–20)
CALCIUM SERPL-MCNC: 9.7 MG/DL (ref 8.3–10.6)
CHLORIDE SERPL-SCNC: 98 MMOL/L (ref 99–110)
CO2 SERPL-SCNC: 26 MMOL/L (ref 21–32)
CREAT SERPL-MCNC: 1 MG/DL (ref 0.8–1.3)
DEPRECATED RDW RBC AUTO: 19 % (ref 12.4–15.4)
EOSINOPHIL # BLD: 0 K/UL (ref 0–0.6)
EOSINOPHIL NFR BLD: 0.7 %
GFR SERPLBLD CREATININE-BSD FMLA CKD-EPI: 76 ML/MIN/{1.73_M2}
GLUCOSE SERPL-MCNC: 112 MG/DL (ref 70–99)
HCT VFR BLD AUTO: 32.7 % (ref 40.5–52.5)
HGB BLD-MCNC: 11 G/DL (ref 13.5–17.5)
LYMPHOCYTES # BLD: 1.7 K/UL (ref 1–5.1)
LYMPHOCYTES NFR BLD: 28.2 %
MCH RBC QN AUTO: 35.7 PG (ref 26–34)
MCHC RBC AUTO-ENTMCNC: 33.7 G/DL (ref 31–36)
MCV RBC AUTO: 106 FL (ref 80–100)
MONOCYTES # BLD: 0.5 K/UL (ref 0–1.3)
MONOCYTES NFR BLD: 7.8 %
NEUTROPHILS # BLD: 3.6 K/UL (ref 1.7–7.7)
NEUTROPHILS NFR BLD: 61.1 %
PLATELET # BLD AUTO: 941 K/UL (ref 135–450)
PMV BLD AUTO: 7.7 FL (ref 5–10.5)
POTASSIUM SERPL-SCNC: 5.9 MMOL/L (ref 3.5–5.1)
PROT SERPL-MCNC: 6.5 G/DL (ref 6.4–8.2)
RBC # BLD AUTO: 3.09 M/UL (ref 4.2–5.9)
SODIUM SERPL-SCNC: 132 MMOL/L (ref 136–145)
WBC # BLD AUTO: 5.9 K/UL (ref 4–11)

## 2025-03-29 ENCOUNTER — PATIENT MESSAGE (OUTPATIENT)
Dept: FAMILY MEDICINE CLINIC | Age: 80
End: 2025-03-29

## 2025-03-29 DIAGNOSIS — E87.1 HYPONATREMIA: ICD-10-CM

## 2025-03-30 ENCOUNTER — RESULTS FOLLOW-UP (OUTPATIENT)
Dept: FAMILY MEDICINE CLINIC | Age: 80
End: 2025-03-30

## 2025-03-30 DIAGNOSIS — E87.5 HYPERKALEMIA: Primary | ICD-10-CM

## 2025-04-01 ENCOUNTER — CARE COORDINATION (OUTPATIENT)
Dept: CASE MANAGEMENT | Age: 80
End: 2025-04-01

## 2025-04-01 NOTE — CARE COORDINATION
Care Transitions Note    Follow Up Call     Patient: Maxx Norman                 Patient : 1945   MRN: [id number]                             Reason for Admission: [place] x 8 day readmission -> hyponatremia, acute toxic metabolic encephalopathy, A Fib on Eliquis ([facility]), abnormal liver fxn test, CML, HTN with relative hypotension [place]  Discharge Date: 3/21/25       RURS: Readmission Risk Score: 19.3    Patient Current Location:  Home: [address] [place] [address] [place] [facility]    Care Transition Nurse contacted the patient by telephone. Verified name and  as identifiers.    Additional needs identified to be addressed with provider   No needs identified         Method of communication with provider: none.    Care Summary Note:   Spoke with spouse Alexus. She reports he is doing much better. He had OV with liver doctor yesterday. He will get labwork for high potassium. Denies chest pain, palpitations, SOB. Tolerating diet. Limiting fluids and aware of 1.5L/day restriction. Sees nephrologist 4/3 per last note. Son manages appointments. She denies needs/concerns today.     Plan of care updates since last contact:  Review of patient management of conditions/medications:       Advance Care Planning:   Does patient have an Advance Directive:     Primary Decision Maker: Benito Norman - Child - [phone]    Secondary Decision Maker: Alexus Braga - Spouse - [phone] .    Medication Review:  No changes since last call.     Remote Patient Monitoring:  Offered patient enrollment in the Remote Patient Monitoring (RPM) program for in-home monitoring: Patient is not eligible for RPM program because: [place] resident .    Assessments:  No changes since last call    Follow Up Appointment:   SARA appointment attended as scheduled   Future Appointments         Provider Specialty Dept Phone    2025 3:15 PM Michelle Humphries MD Family Medicine [phone]          Care

## 2025-04-01 NOTE — TELEPHONE ENCOUNTER
Requested Prescriptions     Pending Prescriptions Disp Refills    rivaroxaban (XARELTO) 20 MG TABS tablet 90 tablet 3     Sig: Take 1 tablet by mouth daily Indications: restart taking it on 7/24/24        Last OV: 3/3/2025  Next OV: see below  Last refill:5/6/2024       Last OVN states below    \"Further plan pending ECHO result   FU Dr. Reyes\"  \"Return in about 2 weeks (around 3/17/2025).\"    Pt had his ECHO done on 3/5/2025    Please advise when pt should come back

## 2025-04-01 NOTE — TELEPHONE ENCOUNTER
He has had 2 admissions since he saw DESTINY Balderrama in the office.   Let get him back in with DESTINY Balderrama next week for HFU.   Then DESTINY Balderrama can decide moving forward next follow up with Dr. Reyes, fabiola.     Script  printed and placed in Dr. Reyes's folder for signature upon his return from [facility].

## 2025-04-02 DIAGNOSIS — E87.1 HYPONATREMIA: ICD-10-CM

## 2025-04-02 LAB
ALBUMIN SERPL-MCNC: 3.8 G/DL (ref 3.4–5)
ANION GAP SERPL CALCULATED.3IONS-SCNC: 11 MMOL/L (ref 3–16)
ANISOCYTOSIS BLD QL SMEAR: ABNORMAL
BASOPHILS # BLD: 0.1 K/UL (ref 0–0.2)
BASOPHILS NFR BLD: 1 %
BUN SERPL-MCNC: 9 MG/DL (ref 7–20)
BURR CELLS BLD QL SMEAR: ABNORMAL
CALCIUM SERPL-MCNC: 9.5 MG/DL (ref 8.3–10.6)
CHLORIDE SERPL-SCNC: 96 MMOL/L (ref 99–110)
CO2 SERPL-SCNC: 24 MMOL/L (ref 21–32)
CREAT SERPL-MCNC: 1 MG/DL (ref 0.8–1.3)
CREAT UR-MCNC: 35.1 MG/DL (ref 39–259)
DEPRECATED RDW RBC AUTO: 19.8 % (ref 12.4–15.4)
EOSINOPHIL # BLD: 0 K/UL (ref 0–0.6)
EOSINOPHIL NFR BLD: 0 %
GFR SERPLBLD CREATININE-BSD FMLA CKD-EPI: 76 ML/MIN/{1.73_M2}
GLUCOSE SERPL-MCNC: 113 MG/DL (ref 70–99)
HCT VFR BLD AUTO: 30.3 % (ref 40.5–52.5)
HGB BLD-MCNC: 10.2 G/DL (ref 13.5–17.5)
LYMPHOCYTES # BLD: 2.7 K/UL (ref 1–5.1)
LYMPHOCYTES NFR BLD: 24 %
MACROCYTES BLD QL SMEAR: ABNORMAL
MCH RBC QN AUTO: 36.4 PG (ref 26–34)
MCHC RBC AUTO-ENTMCNC: 33.8 G/DL (ref 31–36)
MCV RBC AUTO: 107.7 FL (ref 80–100)
METAMYELOCYTES NFR BLD MANUAL: 1 %
MONOCYTES # BLD: 0.8 K/UL (ref 0–1.3)
MONOCYTES NFR BLD: 7 %
MONONUC CELLS NFR BLD MANUAL: 1 %
MYELOCYTES NFR BLD MANUAL: 1 %
NEUTROPHILS # BLD: 7.5 K/UL (ref 1.7–7.7)
NEUTROPHILS NFR BLD: 63 %
NEUTS BAND NFR BLD MANUAL: 2 % (ref 0–7)
PATH INTERP BLD-IMP: YES
PHOSPHATE SERPL-MCNC: 3.2 MG/DL (ref 2.5–4.9)
PLATELET # BLD AUTO: 1250 K/UL (ref 135–450)
PMV BLD AUTO: 7.2 FL (ref 5–10.5)
POIKILOCYTOSIS BLD QL SMEAR: ABNORMAL
POLYCHROMASIA BLD QL SMEAR: ABNORMAL
POTASSIUM SERPL-SCNC: 4.8 MMOL/L (ref 3.5–5.1)
PROT UR-MCNC: 6.31 MG/DL
PROT/CREAT UR-RTO: 0.2 MG/DL
RBC # BLD AUTO: 2.81 M/UL (ref 4.2–5.9)
SLIDE REVIEW: ABNORMAL
SODIUM SERPL-SCNC: 131 MMOL/L (ref 136–145)
SPHEROCYTES BLD QL SMEAR: ABNORMAL
WBC # BLD AUTO: 11.2 K/UL (ref 4–11)

## 2025-04-02 NOTE — TELEPHONE ENCOUNTER
Pt. Son Adonay called to see if he can  the script for Rivaroxaban (Xarelto) 20mg tab. Script has not been signed by Dr. Reyes. He's out for the week I'm routing to [place] for Dr. Murphy to sign. Please print. Son will like to  today.    Adonay  (Son) will like a call when it's ready. [phone]

## 2025-04-03 LAB — PATH INTERP BLD-IMP: NORMAL

## 2025-04-03 NOTE — TELEPHONE ENCOUNTER
Script has been signed and scanned into chart. Call Edward he will get the script when in office this afternoon with pt.

## 2025-04-04 ENCOUNTER — CARE COORDINATION (OUTPATIENT)
Dept: CASE MANAGEMENT | Age: 80
End: 2025-04-04

## 2025-04-04 NOTE — CARE COORDINATION
Care Transitions Note    Follow Up Call     Patient: Maxx Norman                 Patient : 1945   MRN: [id number]                             Reason for Admission: [place] x 8 day readmission -> hyponatremia, acute toxic metabolic encephalopathy, A Fib on Eliquis ([facility]), abnormal liver fxn test, CML, HTN with relative hypotension [place]  Discharge Date: 3/21/25       RURS: Readmission Risk Score: 19.3    Patient Current Location:  Home: [address] [place] [address] [place] [facility]    Care Transition Nurse contacted the spouse/partner  by telephone. Verified name and  as identifiers.    Additional needs identified to be addressed with provider   No needs identified         Method of communication with provider: none.    Care Summary Note:   Completed OV with nephrologist. Has appt with someone else today. Wife not sure who. States he is doing well. States he is tolerating PO and mentation is clear. Denies n/v, headache. Denies needs/concerns going into weekend.     Plan of care updates since last contact:  Review of patient management of conditions/medications:       Advance Care Planning:   Does patient have an Advance Directive: reviewed during previous call, see note. .    Medication Review:  States no changes but then mentioned chemo pill. Son manages meds.     Remote Patient Monitoring:  Offered patient enrollment in the Remote Patient Monitoring (RPM) program for in-home monitoring: Patient is not eligible for RPM program because: [place] resident .    Assessments:  No changes since last call    Follow Up Appointment:   SARA appointment attended as scheduled   Future Appointments         Provider Specialty Dept Phone    2025 3:15 PM Michelle Humphries MD Family Medicine [phone]          Care Transition Nurse provided contact information.  Plan for follow-up call in 2-5 days based on severity of symptoms and risk factors.  Plan for next call: symptom management-

## 2025-04-09 ENCOUNTER — CARE COORDINATION (OUTPATIENT)
Dept: CASE MANAGEMENT | Age: 80
End: 2025-04-09

## 2025-04-09 NOTE — CARE COORDINATION
Care Transitions Note    Follow Up Call     Patient: Maxx Norman                 Patient : 1945   MRN: [id number]                             Reason for Admission: [place] x 8 day readmission -> hyponatremia, acute toxic metabolic encephalopathy, A Fib on Eliquis ([facility]), abnormal liver fxn test, CML, HTN with relative hypotension [place]  Discharge Date: 3/21/25       RURS: Readmission Risk Score: 19.3    Attempted to reach patient for transitions of care follow up.  Unable to reach patient.      Outreach Attempts:   HIPAA compliant voicemail left for patient.     Follow Up Appointment:   Future Appointments         Provider Specialty Dept Phone    2025 3:15 PM Michelle Humphries MD Family Medicine [phone]          Plan for follow-up call in 6-10 days based on severity of symptoms and risk factors. Plan for next call: symptom management-     Emy Will RN

## 2025-04-14 ENCOUNTER — PATIENT MESSAGE (OUTPATIENT)
Dept: CARDIOLOGY CLINIC | Age: 80
End: 2025-04-14

## 2025-04-14 ENCOUNTER — CARE COORDINATION (OUTPATIENT)
Dept: CASE MANAGEMENT | Age: 80
End: 2025-04-14

## 2025-04-14 RX ORDER — IMATINIB MESYLATE 400 MG/1
400 TABLET, FILM COATED ORAL EVERY OTHER DAY
COMMUNITY

## 2025-04-14 NOTE — CARE COORDINATION
Care Transitions Note    Follow Up Call     Patient: Maxx Norman                 Patient : 1945   MRN: [id number]                             Reason for Admission: [place] x 8 day readmission -> hyponatremia, acute toxic metabolic encephalopathy, A Fib on Eliquis ([facility]), abnormal liver fxn test, CML, HTN with relative hypotension -> [place]  Discharge Date: 3/21/25       RURS: Readmission Risk Score: 19.3    Patient Current Location:  Home: [address] [place] [address] Road  [place] [address]    Care Transition Nurse contacted the spouse/partner  by telephone.     Additional needs identified to be addressed with provider   No needs identified         Method of communication with provider: none.    Care Summary Note:   Spoke with wife. States swelling in LLE > RLE. No injury. Cannot tolerate compression stockings. It is red just above foot on R leg. States there have been some areas of weeping. She states he has two chronic sores on his right leg. They do not know of any treatment for this. He is taking a new drug called Imatinib Mesylate 400 mg tablet one every other day for CML - started last week. Unsure if this is what is causing his symptoms. Added chemo pill to med list. Per chart review, son Benito has called the LE edema complaint and med info to Dr Reyes cardiology office. Note that there were instructions that were called to son by Dr Reyes to take furosemide. Wife states that son will be there shortly and asks CTN call back in 10 minutes to review plan. She then called back and said they would be in a hurry to leave when son arrives and CTN reviewed Dr Reyes note with her - Per Dr Reyes, on  take furosemide x 3 days in a row, then give furosemide 3 days/week after that and he needs nephrology appointment to discuss diuretic plan chandrakant with new chemo pill. She voices understanding and will discuss with son if that is the plan they are following and if he has nephro appt. CTN will f/up

## 2025-04-15 ENCOUNTER — CARE COORDINATION (OUTPATIENT)
Dept: CASE MANAGEMENT | Age: 80
End: 2025-04-15

## 2025-04-15 RX ORDER — SPIRONOLACTONE 25 MG/1
25 TABLET ORAL DAILY
COMMUNITY

## 2025-04-15 NOTE — CARE COORDINATION
Care Transitions Note    Follow Up Call     Patient: Maxx Norman                 Patient : 1945   MRN: [id number]                             Reason for Admission: [place] x 8 day readmission -> hyponatremia, acute toxic metabolic encephalopathy, A Fib on Eliquis ([facility]), abnormal liver fxn test, CML, HTN with relative hypotension -> [place]  Discharge Date: 3/21/25       RURS: Readmission Risk Score: 19.3    Patient Current Location:  Home: [address] [place] [address] Road  [place] [address]    Care Transition Nurse contacted the patient by telephone.     Additional needs identified to be addressed with provider   Son addressing needs through MyChart conversations with Dr Reyes and Dr Pablo         Method of communication with provider: none.    Care Summary Note:   CTN contacted son Benito. States he was active in conversation on MyChart with Dr Reyes's office. Also in contact with Dr Pablo office at [facility] to request collab with cardiology about the LE edema, ulcers and weeping. Patient is taking the imatinib EOD per Dr Pablo. Dr Reyes instructed furosemide MWF. Today he was instructed to resume aldactone and that they are going to set up home care nurse for wound care. CTN reviewed prior CM notes and on 3/21 he DC home with [facility]. He will share this in his MyChart conversation.    Sees Dr Pablo Monday, . He will have lab draws on Monday. CTN encouraged him to have the office share results with nephrologist office of Dr Angeles for recommendation.     Had liver ultrasound 3/31 but son unsure of result. This was with [facility]. He looked up the results on their electronic record. He is unsure if there is an upcoming appt for that or not.     Next nephrology OV with Dr Angeles is on 2025. Reviewed there was OV with nephrology NP 4/3/2025.    Son will finish his conversation with specialists and share info on prior HC that did serve where his dad lives. He

## 2025-04-16 ENCOUNTER — CARE COORDINATION (OUTPATIENT)
Dept: CASE MANAGEMENT | Age: 80
End: 2025-04-16

## 2025-04-16 NOTE — CARE COORDINATION
Care Transitions Note    CTN received voice mail message from patient's son Benito that he would be unavailable to be reached today as he is on a job site. He said that Dr Pablo was sending orders to [facility] and asks if CTN can get the home care agency to contact his mom directly at the home number because his dad's legs look bad.     CTN contacted [facility] and spoke with Basil. They did not get an order for home care for this patient. They have had referrals in the past and he was a non-admit.     CTN contacted [facility] office of Dr Pablo. The notes they have say the referral was made to [facility]  ([place]- accepted referral today).     CTN contacted [facility] (Abhishek) and provided info per son if can schedule asap please call patient/wife directly. They did not have son's contact info so CTN provided that also.     CTN spoke with son Benito and provided info on [facility]  ([phone]).     Emy Will RN  Care Transition Nurse  [phone] mobile    Future Appointments   Date Time Provider Department Center   7/24/2025  3:15 PM Michelle Humphries MD [facility] DEP

## 2025-04-16 NOTE — TELEPHONE ENCOUNTER
I reviewed pt chart.   From what I can tell, his swelling is non cardiac.   He should follow up with nephrology and hematology. Patient should follow up with them.

## 2025-04-17 ENCOUNTER — CARE COORDINATION (OUTPATIENT)
Dept: CARE COORDINATION | Age: 80
End: 2025-04-17

## 2025-04-17 NOTE — CARE COORDINATION
Call to spouse for update on contact with [facility]. She has not heard from anyone today. She stated that patient really need someone to look at patient's legs.    DMITRY spoke with Tamy at [facility] who reported that patient was on the schedule for tomorrow and patient/spouse should be receiving a call by the end of the day.     Spouse updated. SW will follow up.

## 2025-04-18 ENCOUNTER — CARE COORDINATION (OUTPATIENT)
Dept: CARE COORDINATION | Age: 80
End: 2025-04-18

## 2025-04-18 NOTE — CARE COORDINATION
Follow up call with spouse. She reported that [facility] RN and PT were out for assessment. Discussed palliative care. Spouse was unfamiliar with this service. SW educated spouse. Assessed patient needs. Patient is needing assistance with ADLs.     SW will contact [facility] to determine if they have availability for patient and request PCP referrals.  SW will make referral to [facility].

## 2025-04-21 ENCOUNTER — APPOINTMENT (OUTPATIENT)
Dept: GENERAL RADIOLOGY | Age: 80
DRG: 603 | End: 2025-04-21
Payer: MEDICARE

## 2025-04-21 ENCOUNTER — HOSPITAL ENCOUNTER (INPATIENT)
Age: 80
LOS: 2 days | Discharge: HOME HEALTH | DRG: 603 | End: 2025-04-23
Attending: INTERNAL MEDICINE | Admitting: INTERNAL MEDICINE
Payer: MEDICARE

## 2025-04-21 DIAGNOSIS — L03.115 CELLULITIS OF RIGHT LOWER EXTREMITY: ICD-10-CM

## 2025-04-21 DIAGNOSIS — L03.116 CELLULITIS OF LEFT LOWER EXTREMITY: ICD-10-CM

## 2025-04-21 DIAGNOSIS — M79.89 LEG SWELLING: ICD-10-CM

## 2025-04-21 DIAGNOSIS — R60.0 LOCALIZED EDEMA: Primary | ICD-10-CM

## 2025-04-21 LAB
ACANTHOCYTES BLD QL SMEAR: ABNORMAL
ALBUMIN SERPL-MCNC: 3.6 G/DL (ref 3.4–5)
ALBUMIN/GLOB SERPL: 1.3 {RATIO} (ref 1.1–2.2)
ALP SERPL-CCNC: 173 U/L (ref 40–129)
ALT SERPL-CCNC: 23 U/L (ref 10–40)
ANION GAP SERPL CALCULATED.3IONS-SCNC: 9 MMOL/L (ref 3–16)
ANISOCYTOSIS BLD QL SMEAR: ABNORMAL
AST SERPL-CCNC: 43 U/L (ref 15–37)
BASOPHILS # BLD: 0.1 K/UL (ref 0–0.2)
BASOPHILS NFR BLD: 1 %
BILIRUB SERPL-MCNC: 2.6 MG/DL (ref 0–1)
BUN SERPL-MCNC: 12 MG/DL (ref 7–20)
BURR CELLS BLD QL SMEAR: ABNORMAL
CALCIUM SERPL-MCNC: 9.1 MG/DL (ref 8.3–10.6)
CHLORIDE SERPL-SCNC: 98 MMOL/L (ref 99–110)
CO2 SERPL-SCNC: 26 MMOL/L (ref 21–32)
CREAT SERPL-MCNC: 1.1 MG/DL (ref 0.8–1.3)
DEPRECATED RDW RBC AUTO: 20 % (ref 12.4–15.4)
EOSINOPHIL # BLD: 0 K/UL (ref 0–0.6)
EOSINOPHIL NFR BLD: 0 %
GFR SERPLBLD CREATININE-BSD FMLA CKD-EPI: 68 ML/MIN/{1.73_M2}
GLUCOSE SERPL-MCNC: 118 MG/DL (ref 70–99)
HCT VFR BLD AUTO: 31.6 % (ref 40.5–52.5)
HGB BLD-MCNC: 11.1 G/DL (ref 13.5–17.5)
LACTATE BLDV-SCNC: 1.2 MMOL/L (ref 0.4–1.9)
LACTATE BLDV-SCNC: 1.4 MMOL/L (ref 0.4–1.9)
LYMPHOCYTES # BLD: 0.6 K/UL (ref 1–5.1)
LYMPHOCYTES NFR BLD: 6 %
MCH RBC QN AUTO: 37.6 PG (ref 26–34)
MCHC RBC AUTO-ENTMCNC: 35.1 G/DL (ref 31–36)
MCV RBC AUTO: 106.9 FL (ref 80–100)
MONOCYTES # BLD: 0.1 K/UL (ref 0–1.3)
MONOCYTES NFR BLD: 1 %
NEUTROPHILS # BLD: 8.9 K/UL (ref 1.7–7.7)
NEUTROPHILS NFR BLD: 92 %
NRBC BLD-RTO: 1 /100 WBC
NT-PROBNP SERPL-MCNC: 1904 PG/ML (ref 0–449)
PLATELET # BLD AUTO: 409 K/UL (ref 135–450)
PLATELET BLD QL SMEAR: ADEQUATE
PMV BLD AUTO: 6.8 FL (ref 5–10.5)
POIKILOCYTOSIS BLD QL SMEAR: ABNORMAL
POLYCHROMASIA BLD QL SMEAR: ABNORMAL
POTASSIUM SERPL-SCNC: 3.6 MMOL/L (ref 3.5–5.1)
PROT SERPL-MCNC: 6.4 G/DL (ref 6.4–8.2)
RBC # BLD AUTO: 2.95 M/UL (ref 4.2–5.9)
SLIDE REVIEW: ABNORMAL
SODIUM SERPL-SCNC: 133 MMOL/L (ref 136–145)
WBC # BLD AUTO: 9.7 K/UL (ref 4–11)

## 2025-04-21 PROCEDURE — 6370000000 HC RX 637 (ALT 250 FOR IP): Performed by: PHYSICIAN ASSISTANT

## 2025-04-21 PROCEDURE — 85025 COMPLETE CBC W/AUTO DIFF WBC: CPT

## 2025-04-21 PROCEDURE — 80053 COMPREHEN METABOLIC PANEL: CPT

## 2025-04-21 PROCEDURE — 2500000003 HC RX 250 WO HCPCS: Performed by: INTERNAL MEDICINE

## 2025-04-21 PROCEDURE — 96374 THER/PROPH/DIAG INJ IV PUSH: CPT

## 2025-04-21 PROCEDURE — 6360000002 HC RX W HCPCS: Performed by: INTERNAL MEDICINE

## 2025-04-21 PROCEDURE — 73630 X-RAY EXAM OF FOOT: CPT

## 2025-04-21 PROCEDURE — 6370000000 HC RX 637 (ALT 250 FOR IP): Performed by: INTERNAL MEDICINE

## 2025-04-21 PROCEDURE — 83880 ASSAY OF NATRIURETIC PEPTIDE: CPT

## 2025-04-21 PROCEDURE — 87040 BLOOD CULTURE FOR BACTERIA: CPT

## 2025-04-21 PROCEDURE — 96365 THER/PROPH/DIAG IV INF INIT: CPT

## 2025-04-21 PROCEDURE — 99285 EMERGENCY DEPT VISIT HI MDM: CPT

## 2025-04-21 PROCEDURE — 2580000003 HC RX 258: Performed by: PHYSICIAN ASSISTANT

## 2025-04-21 PROCEDURE — 94760 N-INVAS EAR/PLS OXIMETRY 1: CPT

## 2025-04-21 PROCEDURE — 96368 THER/DIAG CONCURRENT INF: CPT

## 2025-04-21 PROCEDURE — 36415 COLL VENOUS BLD VENIPUNCTURE: CPT

## 2025-04-21 PROCEDURE — 73590 X-RAY EXAM OF LOWER LEG: CPT

## 2025-04-21 PROCEDURE — 1200000000 HC SEMI PRIVATE

## 2025-04-21 PROCEDURE — 87641 MR-STAPH DNA AMP PROBE: CPT

## 2025-04-21 PROCEDURE — 83605 ASSAY OF LACTIC ACID: CPT

## 2025-04-21 PROCEDURE — 6360000002 HC RX W HCPCS: Performed by: PHYSICIAN ASSISTANT

## 2025-04-21 RX ORDER — SPIRONOLACTONE 25 MG/1
25 TABLET ORAL DAILY
Status: DISCONTINUED | OUTPATIENT
Start: 2025-04-22 | End: 2025-04-23 | Stop reason: HOSPADM

## 2025-04-21 RX ORDER — ACETAMINOPHEN 650 MG/1
650 SUPPOSITORY RECTAL EVERY 6 HOURS PRN
Status: DISCONTINUED | OUTPATIENT
Start: 2025-04-21 | End: 2025-04-23 | Stop reason: HOSPADM

## 2025-04-21 RX ORDER — ONDANSETRON 4 MG/1
4 TABLET, ORALLY DISINTEGRATING ORAL EVERY 8 HOURS PRN
Status: DISCONTINUED | OUTPATIENT
Start: 2025-04-21 | End: 2025-04-23 | Stop reason: HOSPADM

## 2025-04-21 RX ORDER — SODIUM CHLORIDE 1 G/1
1 TABLET ORAL 2 TIMES DAILY WITH MEALS
Status: DISCONTINUED | OUTPATIENT
Start: 2025-04-21 | End: 2025-04-23 | Stop reason: HOSPADM

## 2025-04-21 RX ORDER — POTASSIUM CHLORIDE 7.45 MG/ML
10 INJECTION INTRAVENOUS PRN
Status: DISCONTINUED | OUTPATIENT
Start: 2025-04-21 | End: 2025-04-23 | Stop reason: HOSPADM

## 2025-04-21 RX ORDER — SODIUM CHLORIDE 9 MG/ML
INJECTION, SOLUTION INTRAVENOUS PRN
Status: DISCONTINUED | OUTPATIENT
Start: 2025-04-21 | End: 2025-04-23 | Stop reason: HOSPADM

## 2025-04-21 RX ORDER — ONDANSETRON 2 MG/ML
4 INJECTION INTRAMUSCULAR; INTRAVENOUS EVERY 6 HOURS PRN
Status: DISCONTINUED | OUTPATIENT
Start: 2025-04-21 | End: 2025-04-23 | Stop reason: HOSPADM

## 2025-04-21 RX ORDER — POTASSIUM CHLORIDE 1500 MG/1
40 TABLET, EXTENDED RELEASE ORAL PRN
Status: DISCONTINUED | OUTPATIENT
Start: 2025-04-21 | End: 2025-04-23 | Stop reason: HOSPADM

## 2025-04-21 RX ORDER — VANCOMYCIN 1.75 G/350ML
1250 INJECTION, SOLUTION INTRAVENOUS
Status: DISCONTINUED | OUTPATIENT
Start: 2025-04-22 | End: 2025-04-22

## 2025-04-21 RX ORDER — ATORVASTATIN CALCIUM 40 MG/1
40 TABLET, FILM COATED ORAL NIGHTLY
Status: DISCONTINUED | OUTPATIENT
Start: 2025-04-21 | End: 2025-04-22

## 2025-04-21 RX ORDER — HYDROCODONE BITARTRATE AND ACETAMINOPHEN 5; 325 MG/1; MG/1
1 TABLET ORAL ONCE
Status: COMPLETED | OUTPATIENT
Start: 2025-04-21 | End: 2025-04-21

## 2025-04-21 RX ORDER — ASCORBIC ACID 500 MG
250 TABLET ORAL DAILY
Status: DISCONTINUED | OUTPATIENT
Start: 2025-04-21 | End: 2025-04-23 | Stop reason: HOSPADM

## 2025-04-21 RX ORDER — POLYETHYLENE GLYCOL 3350 17 G/17G
17 POWDER, FOR SOLUTION ORAL DAILY PRN
Status: DISCONTINUED | OUTPATIENT
Start: 2025-04-21 | End: 2025-04-23 | Stop reason: HOSPADM

## 2025-04-21 RX ORDER — FLUTICASONE PROPIONATE 50 MCG
2 SPRAY, SUSPENSION (ML) NASAL DAILY PRN
Status: DISCONTINUED | OUTPATIENT
Start: 2025-04-21 | End: 2025-04-23 | Stop reason: HOSPADM

## 2025-04-21 RX ORDER — SODIUM CHLORIDE 0.9 % (FLUSH) 0.9 %
5-40 SYRINGE (ML) INJECTION PRN
Status: DISCONTINUED | OUTPATIENT
Start: 2025-04-21 | End: 2025-04-23 | Stop reason: HOSPADM

## 2025-04-21 RX ORDER — ACETAMINOPHEN 325 MG/1
650 TABLET ORAL ONCE
Status: COMPLETED | OUTPATIENT
Start: 2025-04-21 | End: 2025-04-21

## 2025-04-21 RX ORDER — ACETAMINOPHEN 325 MG/1
650 TABLET ORAL EVERY 6 HOURS PRN
Status: DISCONTINUED | OUTPATIENT
Start: 2025-04-21 | End: 2025-04-23 | Stop reason: HOSPADM

## 2025-04-21 RX ORDER — ENOXAPARIN SODIUM 100 MG/ML
40 INJECTION SUBCUTANEOUS NIGHTLY
Status: DISCONTINUED | OUTPATIENT
Start: 2025-04-21 | End: 2025-04-21 | Stop reason: ALTCHOICE

## 2025-04-21 RX ORDER — DIPHENHYDRAMINE HYDROCHLORIDE 50 MG/ML
12.5 INJECTION, SOLUTION INTRAMUSCULAR; INTRAVENOUS ONCE
Status: COMPLETED | OUTPATIENT
Start: 2025-04-21 | End: 2025-04-21

## 2025-04-21 RX ORDER — VANCOMYCIN 1.25 G/250ML
1750 INJECTION, SOLUTION INTRAVENOUS ONCE
Status: COMPLETED | OUTPATIENT
Start: 2025-04-21 | End: 2025-04-21

## 2025-04-21 RX ORDER — MAGNESIUM SULFATE IN WATER 40 MG/ML
2000 INJECTION, SOLUTION INTRAVENOUS PRN
Status: DISCONTINUED | OUTPATIENT
Start: 2025-04-21 | End: 2025-04-23 | Stop reason: HOSPADM

## 2025-04-21 RX ORDER — FUROSEMIDE 10 MG/ML
40 INJECTION INTRAMUSCULAR; INTRAVENOUS 2 TIMES DAILY
Status: DISCONTINUED | OUTPATIENT
Start: 2025-04-21 | End: 2025-04-23

## 2025-04-21 RX ORDER — SODIUM CHLORIDE 0.9 % (FLUSH) 0.9 %
5-40 SYRINGE (ML) INJECTION EVERY 12 HOURS SCHEDULED
Status: DISCONTINUED | OUTPATIENT
Start: 2025-04-21 | End: 2025-04-23 | Stop reason: HOSPADM

## 2025-04-21 RX ORDER — FUROSEMIDE 40 MG/1
40 TABLET ORAL
Status: ON HOLD | COMMUNITY
End: 2025-04-23

## 2025-04-21 RX ADMIN — Medication 1 G: at 17:39

## 2025-04-21 RX ADMIN — OXYCODONE HYDROCHLORIDE AND ACETAMINOPHEN 250 MG: 500 TABLET ORAL at 17:39

## 2025-04-21 RX ADMIN — VANCOMYCIN 1750 MG: 1.25 INJECTION, SOLUTION INTRAVENOUS at 12:53

## 2025-04-21 RX ADMIN — HYDROCODONE BITARTRATE AND ACETAMINOPHEN 1 TABLET: 5; 325 TABLET ORAL at 13:18

## 2025-04-21 RX ADMIN — SODIUM CHLORIDE, PRESERVATIVE FREE 10 ML: 5 INJECTION INTRAVENOUS at 21:35

## 2025-04-21 RX ADMIN — CEFEPIME 2000 MG: 2 INJECTION, POWDER, FOR SOLUTION INTRAVENOUS at 12:05

## 2025-04-21 RX ADMIN — ATORVASTATIN CALCIUM 40 MG: 40 TABLET, FILM COATED ORAL at 21:34

## 2025-04-21 RX ADMIN — DIPHENHYDRAMINE HYDROCHLORIDE 12.5 MG: 50 INJECTION INTRAMUSCULAR; INTRAVENOUS at 13:18

## 2025-04-21 RX ADMIN — FUROSEMIDE 40 MG: 10 INJECTION, SOLUTION INTRAMUSCULAR; INTRAVENOUS at 17:39

## 2025-04-21 RX ADMIN — ACETAMINOPHEN 650 MG: 325 TABLET ORAL at 13:18

## 2025-04-21 ASSESSMENT — PAIN DESCRIPTION - ORIENTATION
ORIENTATION: RIGHT;LEFT
ORIENTATION: LEFT;RIGHT
ORIENTATION: LEFT;RIGHT

## 2025-04-21 ASSESSMENT — PAIN SCALES - GENERAL
PAINLEVEL_OUTOF10: 7
PAINLEVEL_OUTOF10: 10
PAINLEVEL_OUTOF10: 4

## 2025-04-21 ASSESSMENT — PAIN DESCRIPTION - DESCRIPTORS
DESCRIPTORS: ACHING
DESCRIPTORS: ACHING

## 2025-04-21 ASSESSMENT — PAIN - FUNCTIONAL ASSESSMENT
PAIN_FUNCTIONAL_ASSESSMENT: PREVENTS OR INTERFERES SOME ACTIVE ACTIVITIES AND ADLS
PAIN_FUNCTIONAL_ASSESSMENT: PREVENTS OR INTERFERES SOME ACTIVE ACTIVITIES AND ADLS
PAIN_FUNCTIONAL_ASSESSMENT: 0-10

## 2025-04-21 ASSESSMENT — PAIN DESCRIPTION - FREQUENCY: FREQUENCY: CONTINUOUS

## 2025-04-21 ASSESSMENT — PAIN DESCRIPTION - LOCATION
LOCATION: LEG

## 2025-04-21 ASSESSMENT — PAIN DESCRIPTION - PAIN TYPE: TYPE: ACUTE PAIN

## 2025-04-21 NOTE — ED NOTES
ED TO INPATIENT SBAR HANDOFF    Patient Name: Maxx Norman   Preferred Name: Maxx  : 1945  79 y.o.   Family/Caregiver Present: no   Code Status Order: Full Code  PO Status: NPO:No  Telemetry Order: No  C-SSRS: Risk of Suicide: No Risk  Sitter no   Restraints:     Sepsis Risk Score Sepsis V2 Risk Score: 47.9    Situation  Chief Complaint   Patient presents with    Leg Swelling     Pt to ED with CC of bilateral lower leg redness and swelling.  Pt's family states that sx have been ongoing x1.5 weeks.  [facility] sent him to ED to be admitted.     Brief Description of Patient's Condition: Patient a/o from home, bilat [place]. Increased edema and redness. Tender to touch.   Mental Status: oriented, alert, coherent, logical, thought processes intact, and able to concentrate and follow conversation  Arrived from:Home  Imaging:   XR TIBIA FIBULA RIGHT (2 VIEWS)   Final Result   No acute osseous abnormality.         XR TIBIA FIBULA LEFT (2 VIEWS)   Final Result   No acute osseous abnormality.         XR FOOT RIGHT (MIN 3 VIEWS)   Final Result   1.  No acute osseous findings.  No evidence of osteomyelitis or soft tissue   gas         XR FOOT LEFT (MIN 3 VIEWS)   Final Result   No radiographic evidence of osteomyelitis.         Vascular duplex lower extremity venous bilateral    (Results Pending)     Abnormal labs:   Abnormal Labs Reviewed   CBC WITH AUTO DIFFERENTIAL - Abnormal; Notable for the following components:       Result Value    RBC 2.95 (*)     Hemoglobin 11.1 (*)     Hematocrit 31.6 (*)     .9 (*)     MCH 37.6 (*)     RDW 20.0 (*)     Neutrophils Absolute 8.9 (*)     Lymphocytes Absolute 0.6 (*)     nRBC 1 (*)     Anisocytosis Occasional (*)     Polychromasia Occasional (*)     Poikilocytes 1+ (*)     Acanthocytes 1+ (*)     Bessie Cells Occasional (*)     All other components within normal limits   COMPREHENSIVE METABOLIC PANEL W/ REFLEX TO MG FOR LOW K - Abnormal; Notable for the following

## 2025-04-21 NOTE — PLAN OF CARE
Problem: Chronic Conditions and Co-morbidities  Goal: Patient's chronic conditions and co-morbidity symptoms are monitored and maintained or improved  Outcome: Progressing  Flowsheets (Taken 4/21/2025 1530)  Care Plan - Patient's Chronic Conditions and Co-Morbidity Symptoms are Monitored and Maintained or Improved:   Monitor and assess patient's chronic conditions and comorbid symptoms for stability, deterioration, or improvement   Collaborate with multidisciplinary team to address chronic and comorbid conditions and prevent exacerbation or deterioration   Update acute care plan with appropriate goals if chronic or comorbid symptoms are exacerbated and prevent overall improvement and discharge     Problem: Discharge Planning  Goal: Discharge to home or other facility with appropriate resources  Outcome: Progressing  Flowsheets (Taken 4/21/2025 1530)  Discharge to home or other facility with appropriate resources:   Identify barriers to discharge with patient and caregiver   Arrange for needed discharge resources and transportation as appropriate   Identify discharge learning needs (meds, wound care, etc)     Problem: Pain  Goal: Verbalizes/displays adequate comfort level or baseline comfort level  Outcome: Progressing  Flowsheets (Taken 4/21/2025 1516)  Verbalizes/displays adequate comfort level or baseline comfort level:   Encourage patient to monitor pain and request assistance   Assess pain using appropriate pain scale   Administer analgesics based on type and severity of pain and evaluate response   Implement non-pharmacological measures as appropriate and evaluate response   Notify Licensed Independent Practitioner if interventions unsuccessful or patient reports new pain   Consider cultural and social influences on pain and pain management     Problem: Safety - Adult  Goal: Free from fall injury  Outcome: Progressing     Problem: ABCDS Injury Assessment  Goal: Absence of physical injury  Outcome:

## 2025-04-21 NOTE — ED PROVIDER NOTES
[facility] EMERGENCY DEPARTMENT  EMERGENCY DEPARTMENT ENCOUNTER        Pt Name: Maxx Norman  MRN: [id number]  Birthdate 1945  Date of evaluation: 4/21/2025  Provider: Tash Orozco PA-C  PCP: Michelle Humphries MD  Note Started: 2:08 PM EDT 4/21/25      MALU. I have evaluated this patient.        CHIEF COMPLAINT       Chief Complaint   Patient presents with    Leg Swelling     Pt to ED with CC of bilateral lower leg redness and swelling.  Pt's family states that sx have been ongoing x1.5 weeks.  States [facility] sent him to ED to be admitted.       HISTORY OF PRESENT ILLNESS: 1 or more Elements     History From: patient            Chief Complaint: lower extremity edema, cellulitis    Maxx Norman is a 79 y.o. male with PMH CML who presents to the ED with complaint bilateral lower extremity redness, edema that have been progressively worsening over the past 1-1/2 weeks.  He saw [facility] today who wanted him to come in for admission and be treated with IV antibiotics.  He denies any fevers or chills.  He does endorse history of scrotal swelling, however this is significantly improved and is no longer painful.  He is currently on treatment for CML.    Nursing Notes were all reviewed and agreed with or any disagreements were addressed in the HPI.    REVIEW OF SYSTEMS :      Review of Systems   All other systems reviewed and are negative.      Positives and Pertinent negatives as per HPI.     SURGICAL HISTORY     Past Surgical History:   Procedure Laterality Date    A-V CARDIAC PACEMAKER INSERTION      bradycardia     CORONARY ANGIOPLASTY WITH STENT PLACEMENT  2008    CT BIOPSY BONE MARROW  3/17/2025    CT BIOPSY BONE MARROW 3/17/2025 [facility] CT    EP DEVICE PROCEDURE N/A 7/22/2024    Insert PPM dual performed by Shankar Carballo MD at [facility] CARDIAC CATH LAB    EXTERNAL EAR SURGERY Right     removed basal cell cancer    SKIN CANCER DESTRUCTION  03/2017    Right cheek       CURRENTMEDICATIONS       Previous

## 2025-04-21 NOTE — ED NOTES
X2 blood cultures obtained at this time in bilateral arms. Proper procedure completed. Pt tolerated well.

## 2025-04-21 NOTE — H&P
V2.0  History and Physical      Name:  Maxx Norman /Age/Sex: 1945  (79 y.o. male)   MRN & CSN:  [id number] & [id number] Encounter Date/Time: 2025 3:27 PM EDT   Location:  [place] PCP: Michelle Humphries MD       Hospital Day: 1    Assessment and Plan:   Maxx Norman is a 79 y.o. male with a pmh of CAD, status post stent placements, hypertension, hyperlipidemia, paroxysmal A-fib, status post PPM, recent diagnosis of CML, on imatinib who was sent to hospital from oncology office due to bilateral lower extremity swelling, redness and pain.      Hospital Problems           Last Modified POA    * (Principal) Bilateral lower leg cellulitis 2025 Yes       Plan:  Bilateral lower extremity cellulitis, continue vancomycin and cefepime empirically while waiting for blood cultures.  Lower extremity Doppler to rule out DVT.  Consult ID.  Bilateral lower extremity swelling, start IV Lasix, strict urine output monitoring.  Close monitoring of renal function.  Patient is high risk for NIDHI.  CML, hold imatinib for now.  Consult oncology.  A-fib, resume Xarelto.  Elevated liver function panel diagnosed recently, liver function test overall better compared to prior admission.  Seen by GI during recent admission and noted to have fatty liver on ultrasound.  Serologies unremarkable.  Follow-up with GI as planned.  Hyponatremia, overall improved compared to recent admission.  Thought to be multifactorial.  Close monitor sodium while on Lasix.  Resumed p.o. sodium.  Hypertension, hyperlipidemia, continue home meds.    Disposition:   Current Living situation: From home  Expected Disposition: To be determined  Estimated D/C: To be determined    Diet ADULT DIET; Regular; Low Sodium (2 gm)   DVT Prophylaxis [] Lovenox, []  Heparin, [] SCDs, [] Ambulation,  [] Eliquis, [x] Xarelto, [] Coumadin   Code Status Full Code         Personally reviewed Lab Studies and Imaging     Drugs that require monitoring for toxicity

## 2025-04-21 NOTE — CONSULTS
Clinical Pharmacy Note  Vancomycin Consult    Maxx Norman is a 79 y.o. male ordered vancomycin for skin/soft tissue infection; consult received from Dr. Galloway to manage therapy. Also receiving cefepime.    Allergies:  Bee venom     Temp max:  Temp (24hrs), Av.8 °F (36.6 °C), Min:97.7 °F (36.5 °C), Max:97.9 °F (36.6 °C)      Recent Labs     25  1148   WBC 9.7       Recent Labs     25  1148   BUN 12   CREATININE 1.1         Intake/Output Summary (Last 24 hours) at 2025 1958  Last data filed at 2025 1818  Gross per 24 hour   Intake 340 ml   Output --   Net 340 ml       Culture Results:  pending    Ht Readings from Last 1 Encounters:   25 1.88 m (6' 2.02\")        Wt Readings from Last 1 Encounters:   25 96.2 kg (212 lb)         Estimated Creatinine Clearance: 62 mL/min (based on SCr of 1.1 mg/dL).    Assessment/Plan:  Day # 1 of vancomycin.  Vancomycin 1750 mg x 1 dose given in ER  Vancomycin 1250 mg IV every 18 hours to follow   Goal -600  Renal function at baseline  Predicted  (24-48H), 522 (steady state)  Will obtain level after 3 total doses (1200 on )    Thank you for the consult.   Fadumo De Paz, FelixD, BCPS  2025 7:59 PM

## 2025-04-21 NOTE — ED NOTES
This RN introduced self to pt and family at bedside. Pt a/ox4, hard of hearing bilat,  placed on tele with VS obtained. Call light in reach, pt educated on use , and to alert staff for needs. All questions answered at this time.

## 2025-04-22 ENCOUNTER — HOSPITAL ENCOUNTER (INPATIENT)
Dept: VASCULAR LAB | Age: 80
Discharge: HOME | DRG: 603 | End: 2025-04-24
Attending: INTERNAL MEDICINE
Payer: MEDICARE

## 2025-04-22 PROBLEM — Z86.79 HISTORY OF CAD (CORONARY ARTERY DISEASE): Status: ACTIVE | Noted: 2025-04-22

## 2025-04-22 LAB
ACANTHOCYTES BLD QL SMEAR: ABNORMAL
ALBUMIN SERPL-MCNC: 3.1 G/DL (ref 3.4–5)
ALBUMIN/GLOB SERPL: 1.3 {RATIO} (ref 1.1–2.2)
ALP SERPL-CCNC: 143 U/L (ref 40–129)
ALT SERPL-CCNC: 20 U/L (ref 10–40)
ANION GAP SERPL CALCULATED.3IONS-SCNC: 9 MMOL/L (ref 3–16)
ANISOCYTOSIS BLD QL SMEAR: ABNORMAL
AST SERPL-CCNC: 37 U/L (ref 15–37)
BASOPHILS # BLD: 0.2 K/UL (ref 0–0.2)
BASOPHILS NFR BLD: 3 %
BILIRUB SERPL-MCNC: 2.4 MG/DL (ref 0–1)
BUN SERPL-MCNC: 12 MG/DL (ref 7–20)
BURR CELLS BLD QL SMEAR: ABNORMAL
CALCIUM SERPL-MCNC: 8.3 MG/DL (ref 8.3–10.6)
CHLORIDE SERPL-SCNC: 99 MMOL/L (ref 99–110)
CO2 SERPL-SCNC: 25 MMOL/L (ref 21–32)
CREAT SERPL-MCNC: 1 MG/DL (ref 0.8–1.3)
DEPRECATED RDW RBC AUTO: 20.1 % (ref 12.4–15.4)
ECHO BSA: 2.24 M2
EOSINOPHIL # BLD: 0.3 K/UL (ref 0–0.6)
EOSINOPHIL NFR BLD: 4 %
GFR SERPLBLD CREATININE-BSD FMLA CKD-EPI: 76 ML/MIN/{1.73_M2}
GLUCOSE SERPL-MCNC: 94 MG/DL (ref 70–99)
HCT VFR BLD AUTO: 26.8 % (ref 40.5–52.5)
HGB BLD-MCNC: 9.3 G/DL (ref 13.5–17.5)
LYMPHOCYTES # BLD: 0.9 K/UL (ref 1–5.1)
LYMPHOCYTES NFR BLD: 12 %
MAGNESIUM SERPL-MCNC: 1.91 MG/DL (ref 1.8–2.4)
MCH RBC QN AUTO: 37 PG (ref 26–34)
MCHC RBC AUTO-ENTMCNC: 34.6 G/DL (ref 31–36)
MCV RBC AUTO: 106.9 FL (ref 80–100)
MONOCYTES # BLD: 0.3 K/UL (ref 0–1.3)
MONOCYTES NFR BLD: 4 %
MRSA DNA SPEC QL NAA+PROBE: NORMAL
NEUTROPHILS # BLD: 5.6 K/UL (ref 1.7–7.7)
NEUTROPHILS NFR BLD: 77 %
PLATELET # BLD AUTO: 346 K/UL (ref 135–450)
PLATELET BLD QL SMEAR: ADEQUATE
PMV BLD AUTO: 7.2 FL (ref 5–10.5)
POIKILOCYTOSIS BLD QL SMEAR: ABNORMAL
POTASSIUM SERPL-SCNC: 3.4 MMOL/L (ref 3.5–5.1)
PROT SERPL-MCNC: 5.4 G/DL (ref 6.4–8.2)
RBC # BLD AUTO: 2.51 M/UL (ref 4.2–5.9)
SLIDE REVIEW: ABNORMAL
SODIUM SERPL-SCNC: 133 MMOL/L (ref 136–145)
WBC # BLD AUTO: 7.3 K/UL (ref 4–11)

## 2025-04-22 PROCEDURE — 6370000000 HC RX 637 (ALT 250 FOR IP): Performed by: INTERNAL MEDICINE

## 2025-04-22 PROCEDURE — 6360000002 HC RX W HCPCS: Performed by: INTERNAL MEDICINE

## 2025-04-22 PROCEDURE — 2500000003 HC RX 250 WO HCPCS: Performed by: INTERNAL MEDICINE

## 2025-04-22 PROCEDURE — 36415 COLL VENOUS BLD VENIPUNCTURE: CPT

## 2025-04-22 PROCEDURE — 83735 ASSAY OF MAGNESIUM: CPT

## 2025-04-22 PROCEDURE — 80053 COMPREHEN METABOLIC PANEL: CPT

## 2025-04-22 PROCEDURE — 99223 1ST HOSP IP/OBS HIGH 75: CPT | Performed by: INTERNAL MEDICINE

## 2025-04-22 PROCEDURE — 85025 COMPLETE CBC W/AUTO DIFF WBC: CPT

## 2025-04-22 PROCEDURE — 93970 EXTREMITY STUDY: CPT

## 2025-04-22 PROCEDURE — 2580000003 HC RX 258: Performed by: INTERNAL MEDICINE

## 2025-04-22 PROCEDURE — 1200000000 HC SEMI PRIVATE

## 2025-04-22 RX ORDER — POTASSIUM CHLORIDE 750 MG/1
40 TABLET, EXTENDED RELEASE ORAL 2 TIMES DAILY
Status: DISCONTINUED | OUTPATIENT
Start: 2025-04-22 | End: 2025-04-23 | Stop reason: HOSPADM

## 2025-04-22 RX ORDER — ATORVASTATIN CALCIUM 10 MG/1
10 TABLET, FILM COATED ORAL NIGHTLY
Status: DISCONTINUED | OUTPATIENT
Start: 2025-04-22 | End: 2025-04-23 | Stop reason: HOSPADM

## 2025-04-22 RX ADMIN — WATER 2000 MG: 1 INJECTION INTRAMUSCULAR; INTRAVENOUS; SUBCUTANEOUS at 11:04

## 2025-04-22 RX ADMIN — ATORVASTATIN CALCIUM 10 MG: 10 TABLET, FILM COATED ORAL at 20:26

## 2025-04-22 RX ADMIN — VANCOMYCIN 1250 MG: 1.75 INJECTION, SOLUTION INTRAVENOUS at 06:02

## 2025-04-22 RX ADMIN — POTASSIUM CHLORIDE 40 MEQ: 750 TABLET, FILM COATED, EXTENDED RELEASE ORAL at 20:26

## 2025-04-22 RX ADMIN — CHOLECALCIFEROL (VITAMIN D3) 10 MCG (400 UNIT) TABLET 400 UNITS: at 10:48

## 2025-04-22 RX ADMIN — FUROSEMIDE 40 MG: 10 INJECTION, SOLUTION INTRAMUSCULAR; INTRAVENOUS at 10:50

## 2025-04-22 RX ADMIN — WATER 2000 MG: 1 INJECTION INTRAMUSCULAR; INTRAVENOUS; SUBCUTANEOUS at 17:30

## 2025-04-22 RX ADMIN — Medication 1 G: at 11:07

## 2025-04-22 RX ADMIN — Medication 1 G: at 17:22

## 2025-04-22 RX ADMIN — OXYCODONE HYDROCHLORIDE AND ACETAMINOPHEN 250 MG: 500 TABLET ORAL at 10:47

## 2025-04-22 RX ADMIN — SPIRONOLACTONE 25 MG: 25 TABLET ORAL at 10:48

## 2025-04-22 RX ADMIN — FUROSEMIDE 40 MG: 10 INJECTION, SOLUTION INTRAMUSCULAR; INTRAVENOUS at 17:26

## 2025-04-22 RX ADMIN — SODIUM CHLORIDE, PRESERVATIVE FREE 10 ML: 5 INJECTION INTRAVENOUS at 10:45

## 2025-04-22 RX ADMIN — SODIUM CHLORIDE, PRESERVATIVE FREE 10 ML: 5 INJECTION INTRAVENOUS at 20:26

## 2025-04-22 RX ADMIN — CEFEPIME 2000 MG: 2 INJECTION, POWDER, FOR SOLUTION INTRAVENOUS at 01:58

## 2025-04-22 RX ADMIN — POTASSIUM CHLORIDE 40 MEQ: 750 TABLET, FILM COATED, EXTENDED RELEASE ORAL at 10:45

## 2025-04-22 ASSESSMENT — PAIN SCALES - GENERAL: PAINLEVEL_OUTOF10: 0

## 2025-04-22 NOTE — CONSULTS
Infectious Diseases Inpatient Consult Note    Reason for Consult:   LE venous stasis / edema, L LE cellulitis  Requesting Physician:   Dr Galloway  Primary Care Physician:  Michelle Humphries MD  History Obtained From:   Pt, EPIC    Admit Date: 4/21/2025  Hospital Day: 2    CHIEF COMPLAINT:       Chief Complaint   Patient presents with    Leg Swelling     Pt to ED with CC of bilateral lower leg redness and swelling.  Pt's family states that sx have been ongoing x1.5 weeks.  [facility]       HISTORY OF PRESENT ILLNESS:      78 yo man  PMH - CAD, AF, HTN, HL, basal cell skin cancer, CML   PSurgHx - PPM, Card procedure, skin cancer resection (R ear)    Presents with LE edema, erythema   Swelling present and worsening over months.  Assoc with pain   No trauma.  No fever / chills   In ED, afeb, WBC 9.7  Admit, started on Vancomycin, Cefepime    Today 4/22, afeb  Reports less swelling and resolution of pain       Past Medical History:    Past Medical History:   Diagnosis Date    Atrial fibrillation (HCC)     Basal cell carcinoma     right ear and right cheek    Bilateral tinnitus 2005    Bradycardia     required pacemaker     CAD (coronary artery disease)     4 stents placed     GERD (gastroesophageal reflux disease)     Hyperlipidemia     Hypertension     Osteoarthritis        Past Surgical History:    Past Surgical History:   Procedure Laterality Date    A-V CARDIAC PACEMAKER INSERTION      bradycardia     CORONARY ANGIOPLASTY WITH STENT PLACEMENT  2008    CT BIOPSY BONE MARROW  3/17/2025    CT BIOPSY BONE MARROW 3/17/2025 [facility] CT    EP DEVICE PROCEDURE N/A 7/22/2024    Insert PPM dual performed by Shankar Carballo MD at [facility] CARDIAC CATH LAB    EXTERNAL EAR SURGERY Right     removed basal cell cancer    SKIN CANCER DESTRUCTION  03/2017    Right cheek       Current Medications:     atorvastatin  10 mg Oral Nightly    potassium chloride  40 mEq Oral BID    sodium chloride flush  5-40 mL

## 2025-04-22 NOTE — PLAN OF CARE
Problem: Chronic Conditions and Co-morbidities  Goal: Patient's chronic conditions and co-morbidity symptoms are monitored and maintained or improved  4/22/2025 0337 by Roslyn Harrell RN  Outcome: Progressing  4/21/2025 1819 by Bailey Clark RN  Outcome: Progressing  Flowsheets (Taken 4/21/2025 1530)  Care Plan - Patient's Chronic Conditions and Co-Morbidity Symptoms are Monitored and Maintained or Improved:   Monitor and assess patient's chronic conditions and comorbid symptoms for stability, deterioration, or improvement   Collaborate with multidisciplinary team to address chronic and comorbid conditions and prevent exacerbation or deterioration   Update acute care plan with appropriate goals if chronic or comorbid symptoms are exacerbated and prevent overall improvement and discharge     Problem: Discharge Planning  Goal: Discharge to home or other facility with appropriate resources  4/22/2025 0337 by Roslyn Harrell RN  Outcome: Progressing  4/21/2025 1819 by Bailey Clark RN  Outcome: Progressing  Flowsheets (Taken 4/21/2025 1530)  Discharge to home or other facility with appropriate resources:   Identify barriers to discharge with patient and caregiver   Arrange for needed discharge resources and transportation as appropriate   Identify discharge learning needs (meds, wound care, etc)     Problem: Pain  Goal: Verbalizes/displays adequate comfort level or baseline comfort level  4/22/2025 0337 by Roslyn Harrell RN  Outcome: Progressing  4/21/2025 1819 by Bailey Clark RN  Outcome: Progressing  Flowsheets (Taken 4/21/2025 1516)  Verbalizes/displays adequate comfort level or baseline comfort level:   Encourage patient to monitor pain and request assistance   Assess pain using appropriate pain scale   Administer analgesics based on type and severity of pain and evaluate response   Implement non-pharmacological measures as appropriate and evaluate response   Notify Licensed

## 2025-04-22 NOTE — CONSULTS
[facility]  Cardiology Consult Note        CC:      Lower extremity edema             HPI:   This is a 79 y.o. male who was admitted for worsening lower extremity edema with cellulitis.  Patient has had ongoing issues with lower extremity edema with being off and on loop diuretics.  He has had an echocardiogram which has shown excellent LV systolic function, no RV dysfunction or TR that could explain lower extremity edema    Past medical history of CAD with stent to the LAD in 2008, A-fib, bradycardia, status post pacemaker, hypertension hyperlipidemia chronic myelocytic leukemia and hyponatremia       Past Medical History:   Diagnosis Date    Atrial fibrillation (HCC)     Basal cell carcinoma     right ear and right cheek    Bilateral tinnitus 2005    Bradycardia     required pacemaker     CAD (coronary artery disease)     4 stents placed     GERD (gastroesophageal reflux disease)     Hyperlipidemia     Hypertension     Osteoarthritis       Past Surgical History:   Procedure Laterality Date    A-V CARDIAC PACEMAKER INSERTION      bradycardia     CORONARY ANGIOPLASTY WITH STENT PLACEMENT  2008    CT BIOPSY BONE MARROW  3/17/2025    CT BIOPSY BONE MARROW 3/17/2025 [facility] CT    EP DEVICE PROCEDURE N/A 7/22/2024    Insert PPM dual performed by Shankar Carballo MD at [facility] CARDIAC CATH LAB    EXTERNAL EAR SURGERY Right     removed basal cell cancer    SKIN CANCER DESTRUCTION  03/2017    Right cheek      Family History   Problem Relation Age of Onset    Arthritis Mother     Heart Disease Father     Stroke Father     Other Brother         couldnt walk and in nursing home  disable     No Known Problems Maternal Grandmother     High Blood Pressure Maternal Grandfather     No Known Problems Paternal Grandmother     No Known Problems Paternal Grandfather       Social History     Tobacco Use    Smoking status: Former     Current packs/day: 0.00     Average packs/day: 0.5 packs/day for 7.0 years (3.5 ttl pk-yrs)

## 2025-04-22 NOTE — CONSULTS
[facility]    Consult Note      Requesting Physician:  mojgan    CHIEF COMPLAINT:  redness in legs     Pt seen in office note is late entry  HISTORY OF PRESENT ILLNESS:    Mr. Norman  is a 79 y.o. male we are seeing in consultation for known cml  The pt has a relatively new dx of cml on gleevec  -prior to diagnosis he was hospitalized twice -last hosp stay he had bad hyponatremia -many water pills were held  He then did start on gleevec outpt for his cml   What is hard to know is that he was swelling but he was swelling too before gleevec-I believe holding the water pills that he was on that were held abbeyley contributed but there was a concern of his hyponatremia thus were in a bind  Gleevec can also cause edema  We had tried outpt to restart 1-2 meds-we tried lasix and spironolactone and he had home health come in   He had redness of his legs on Friday and keflex was sent in  He came to office today with worsening cellulitis   He needed iv abx thus sent in    ICD-10-CM    1. Localized edema  R60.0 Vascular duplex lower extremity venous bilateral     Vascular duplex lower extremity venous bilateral     CANCELED: Vascular duplex hepatic portal flow limited     CANCELED: Vascular duplex hepatic portal flow limited      2. Leg swelling  M79.89       3. Cellulitis of right lower extremity  L03.115       4. Cellulitis of left lower extremity  L03.116              Past Medical History:  Past Medical History:   Diagnosis Date    Atrial fibrillation (HCC)     Basal cell carcinoma     right ear and right cheek    Bilateral tinnitus 2005    Bradycardia     required pacemaker     CAD (coronary artery disease)     4 stents placed     GERD (gastroesophageal reflux disease)     Hyperlipidemia     Hypertension     Osteoarthritis        Past Surgical History:  Past Surgical History:   Procedure

## 2025-04-22 NOTE — PLAN OF CARE
Problem: Chronic Conditions and Co-morbidities  Goal: Patient's chronic conditions and co-morbidity symptoms are monitored and maintained or improved  4/22/2025 1224 by Bailey Clark RN  Outcome: Progressing  Flowsheets (Taken 4/22/2025 1044)  Care Plan - Patient's Chronic Conditions and Co-Morbidity Symptoms are Monitored and Maintained or Improved:   Monitor and assess patient's chronic conditions and comorbid symptoms for stability, deterioration, or improvement   Collaborate with multidisciplinary team to address chronic and comorbid conditions and prevent exacerbation or deterioration   Update acute care plan with appropriate goals if chronic or comorbid symptoms are exacerbated and prevent overall improvement and discharge     Problem: Discharge Planning  Goal: Discharge to home or other facility with appropriate resources  4/22/2025 1224 by Bailey Clark RN  Outcome: Progressing  Flowsheets (Taken 4/22/2025 1044)  Discharge to home or other facility with appropriate resources:   Identify barriers to discharge with patient and caregiver   Arrange for needed discharge resources and transportation as appropriate   Identify discharge learning needs (meds, wound care, etc)     Problem: Pain  Goal: Verbalizes/displays adequate comfort level or baseline comfort level  4/22/2025 1224 by Bailey Clark, RN  Outcome: Progressing  Flowsheets (Taken 4/22/2025 1045)  Verbalizes/displays adequate comfort level or baseline comfort level:   Encourage patient to monitor pain and request assistance   Assess pain using appropriate pain scale   Administer analgesics based on type and severity of pain and evaluate response   Implement non-pharmacological measures as appropriate and evaluate response   Consider cultural and social influences on pain and pain management   Notify Licensed Independent Practitioner if interventions unsuccessful or patient reports new pain     Problem: Safety - Adult  Goal: Free from

## 2025-04-22 NOTE — DISCHARGE INSTR - COC
Continuity of Care Form    Patient Name: Maxx Norman   :  1945  MRN:  [id number]    Admit date:  2025  Discharge date:  2025    Code Status Order: DNR-CC   Advance Directives:     Admitting Physician:  Carlie Galloway MD  PCP: Michelle Humphries MD    Discharging Nurse: YOLA Thurman  Discharging Hospital Unit/Room#: G1S-5794/4124-01  Discharging Unit Phone Number: [phone]    Emergency Contact:   Extended Emergency Contact Information  Primary Emergency Contact: Benito Norman  Address: [address] [place] [id number] [facility]  Home Phone: [phone]  Relation: Child  Secondary Emergency Contact: [place] [address] [facility]  Home Phone: [phone]  Relation: Spouse    Past Surgical History:  Past Surgical History:   Procedure Laterality Date    A-V CARDIAC PACEMAKER INSERTION      bradycardia     CORONARY ANGIOPLASTY WITH STENT PLACEMENT      CT BIOPSY BONE MARROW  3/17/2025    CT BIOPSY BONE MARROW 3/17/2025 [facility] CT    EP DEVICE PROCEDURE N/A 2024    Insert PPM dual performed by Shankar Carballo MD at [facility] CARDIAC CATH LAB    EXTERNAL EAR SURGERY Right     removed basal cell cancer    SKIN CANCER DESTRUCTION  2017    Right cheek       Immunization History:   Immunization History   Administered Date(s) Administered    Influenza Vaccine, unspecified formulation 10/21/2015    Influenza Virus Vaccine 2015    Influenza, FLUAD, (age 65 y+), IM, Quadv, 0.5mL 10/21/2020, 09/15/2022, 10/02/2023    Influenza, FLUBLOK, (age 18 y+), Quadv PF, 0.5mL 10/22/2019    Influenza, FLUCELVAX, (age 6 mo+) IM, Trivalent PF, 0.5mL 2024    Influenza, FLUCELVAX, (age 6 mo+), MDCK, Quadv PF, 0.5mL 2022    Influenza, FLUZONE High Dose, (age 65 y+), IM, Trivalent PF, 0.5mL 2017, 10/09/2018    Pneumococcal, PCV-13, PREVNAR 13, (age 6w+), IM, 0.5mL 2016    Pneumococcal, PPSV23, PNEUMOVAX 23, (age 2y+),

## 2025-04-23 VITALS
TEMPERATURE: 97.9 F | OXYGEN SATURATION: 99 % | HEART RATE: 62 BPM | SYSTOLIC BLOOD PRESSURE: 150 MMHG | BODY MASS INDEX: 24.67 KG/M2 | WEIGHT: 192.24 LBS | DIASTOLIC BLOOD PRESSURE: 59 MMHG | HEIGHT: 74 IN | RESPIRATION RATE: 14 BRPM

## 2025-04-23 LAB
ACANTHOCYTES BLD QL SMEAR: ABNORMAL
ALBUMIN SERPL-MCNC: 3.2 G/DL (ref 3.4–5)
ALBUMIN/GLOB SERPL: 1.3 {RATIO} (ref 1.1–2.2)
ALP SERPL-CCNC: 148 U/L (ref 40–129)
ALT SERPL-CCNC: 14 U/L (ref 10–40)
ANION GAP SERPL CALCULATED.3IONS-SCNC: 10 MMOL/L (ref 3–16)
ANISOCYTOSIS BLD QL SMEAR: ABNORMAL
AST SERPL-CCNC: 37 U/L (ref 15–37)
BASOPHILS # BLD: 0.1 K/UL (ref 0–0.2)
BASOPHILS NFR BLD: 1 %
BILIRUB SERPL-MCNC: 2.4 MG/DL (ref 0–1)
BUN SERPL-MCNC: 12 MG/DL (ref 7–20)
BURR CELLS BLD QL SMEAR: ABNORMAL
CALCIUM SERPL-MCNC: 8.5 MG/DL (ref 8.3–10.6)
CHLORIDE SERPL-SCNC: 100 MMOL/L (ref 99–110)
CO2 SERPL-SCNC: 26 MMOL/L (ref 21–32)
CREAT SERPL-MCNC: 1.1 MG/DL (ref 0.8–1.3)
DEPRECATED RDW RBC AUTO: 19.9 % (ref 12.4–15.4)
EOSINOPHIL # BLD: 0.2 K/UL (ref 0–0.6)
EOSINOPHIL NFR BLD: 3 %
GFR SERPLBLD CREATININE-BSD FMLA CKD-EPI: 68 ML/MIN/{1.73_M2}
GLUCOSE SERPL-MCNC: 91 MG/DL (ref 70–99)
HCT VFR BLD AUTO: 27.1 % (ref 40.5–52.5)
HGB BLD-MCNC: 9.3 G/DL (ref 13.5–17.5)
LYMPHOCYTES # BLD: 0.7 K/UL (ref 1–5.1)
LYMPHOCYTES NFR BLD: 9 %
MCH RBC QN AUTO: 36.7 PG (ref 26–34)
MCHC RBC AUTO-ENTMCNC: 34.5 G/DL (ref 31–36)
MCV RBC AUTO: 106.5 FL (ref 80–100)
MONOCYTES # BLD: 0.4 K/UL (ref 0–1.3)
MONOCYTES NFR BLD: 5 %
NEUTROPHILS # BLD: 6.8 K/UL (ref 1.7–7.7)
NEUTROPHILS NFR BLD: 82 %
PLATELET # BLD AUTO: 324 K/UL (ref 135–450)
PMV BLD AUTO: 6.9 FL (ref 5–10.5)
POIKILOCYTOSIS BLD QL SMEAR: ABNORMAL
POLYCHROMASIA BLD QL SMEAR: ABNORMAL
POTASSIUM SERPL-SCNC: 3.7 MMOL/L (ref 3.5–5.1)
PROT SERPL-MCNC: 5.6 G/DL (ref 6.4–8.2)
RBC # BLD AUTO: 2.55 M/UL (ref 4.2–5.9)
SODIUM SERPL-SCNC: 136 MMOL/L (ref 136–145)
WBC # BLD AUTO: 8.3 K/UL (ref 4–11)

## 2025-04-23 PROCEDURE — 6370000000 HC RX 637 (ALT 250 FOR IP): Performed by: INTERNAL MEDICINE

## 2025-04-23 PROCEDURE — 36415 COLL VENOUS BLD VENIPUNCTURE: CPT

## 2025-04-23 PROCEDURE — 6360000002 HC RX W HCPCS: Performed by: INTERNAL MEDICINE

## 2025-04-23 PROCEDURE — 2500000003 HC RX 250 WO HCPCS: Performed by: INTERNAL MEDICINE

## 2025-04-23 PROCEDURE — 85025 COMPLETE CBC W/AUTO DIFF WBC: CPT

## 2025-04-23 PROCEDURE — 94760 N-INVAS EAR/PLS OXIMETRY 1: CPT

## 2025-04-23 PROCEDURE — 80053 COMPREHEN METABOLIC PANEL: CPT

## 2025-04-23 RX ORDER — FUROSEMIDE 40 MG/1
40 TABLET ORAL DAILY
Status: DISCONTINUED | OUTPATIENT
Start: 2025-04-23 | End: 2025-04-23 | Stop reason: HOSPADM

## 2025-04-23 RX ORDER — FUROSEMIDE 40 MG/1
40 TABLET ORAL DAILY
Qty: 60 TABLET | Refills: 3 | Status: SHIPPED | OUTPATIENT
Start: 2025-04-23

## 2025-04-23 RX ORDER — CEPHALEXIN 500 MG/1
500 CAPSULE ORAL EVERY 6 HOURS SCHEDULED
Status: DISCONTINUED | OUTPATIENT
Start: 2025-04-23 | End: 2025-04-23 | Stop reason: HOSPADM

## 2025-04-23 RX ORDER — SULFAMETHOXAZOLE AND TRIMETHOPRIM 800; 160 MG/1; MG/1
1 TABLET ORAL 2 TIMES DAILY
Qty: 14 TABLET | Refills: 0 | Status: SHIPPED | OUTPATIENT
Start: 2025-04-23 | End: 2025-04-30

## 2025-04-23 RX ADMIN — FUROSEMIDE 40 MG: 40 TABLET ORAL at 09:30

## 2025-04-23 RX ADMIN — POTASSIUM CHLORIDE 40 MEQ: 750 TABLET, FILM COATED, EXTENDED RELEASE ORAL at 09:30

## 2025-04-23 RX ADMIN — OXYCODONE HYDROCHLORIDE AND ACETAMINOPHEN 250 MG: 500 TABLET ORAL at 09:30

## 2025-04-23 RX ADMIN — SPIRONOLACTONE 25 MG: 25 TABLET ORAL at 09:30

## 2025-04-23 RX ADMIN — CEPHALEXIN 500 MG: 500 CAPSULE ORAL at 11:44

## 2025-04-23 RX ADMIN — Medication 1 G: at 09:30

## 2025-04-23 RX ADMIN — CHOLECALCIFEROL (VITAMIN D3) 10 MCG (400 UNIT) TABLET 400 UNITS: at 09:30

## 2025-04-23 RX ADMIN — POLYETHYLENE GLYCOL 3350 17 G: 17 POWDER, FOR SOLUTION ORAL at 02:51

## 2025-04-23 RX ADMIN — WATER 2000 MG: 1 INJECTION INTRAMUSCULAR; INTRAVENOUS; SUBCUTANEOUS at 02:38

## 2025-04-23 ASSESSMENT — PAIN SCALES - GENERAL: PAINLEVEL_OUTOF10: 0

## 2025-04-23 NOTE — CARE COORDINATION
Case Management Assessment  Initial Evaluation    Date/Time of Evaluation: 4/23/2025 12:55 PM  Assessment Completed by: yLla Lopez RN    If patient is discharged prior to next notation, then this note serves as note for discharge by case management.    Patient Name: Maxx Norman                     YOB: 1945  Diagnosis: Localized edema [R60.0]  Leg swelling [M79.89]  Cellulitis of right lower extremity [L03.115]  Cellulitis of left lower extremity [L03.116]  Bilateral lower leg cellulitis [L03.116, L03.115]                     Date / Time: 4/21/2025 10:27 AM    Patient Admission Status: Inpatient   Readmission Risk (Low < 19, Mod (19-27), High > 27): Readmission Risk Score: 24    Current PCP: Michelle Humphries MD  PCP verified by CM? Yes    Chart Reviewed: Yes      History Provided by: Child/Family, Medical Record  Patient Orientation: Alert and Oriented    Patient Cognition: Alert    Hospitalization in the last 30 days (Readmission):  No    If yes, Readmission Assessment in  Navigator will be completed.    Advance Directives:      Code Status: DNR-CC   Patient's Primary Decision Maker is: Legal Next of Kin    Primary Decision Maker: Benito Norman - Child - [phone]    Secondary Decision Maker: Alexus Braga - Spouse - [phone]    Discharge Planning:    Patient lives with: (P) Spouse/Significant Other Type of Home: (P) House (2 steps to enter)  Primary Care Giver: Self  Patient Support Systems include: Spouse/Significant Other, Family Members   Current Financial resources: (P) Medicare  Current community resources: (P) None  Current services prior to admission: (P) None            Current DME:              Type of Home Care services:  (P) None    ADLS  Prior functional level: (P) Assistance with the following:, Cooking, Housework, Shopping  Current functional level: (P) Assistance with the following:, Cooking, Housework, Shopping    PT AM-PAC:   /24  OT AM-PAC:   /24    Family can

## 2025-04-23 NOTE — CARE COORDINATION
Wound Referral Progress Note       NAME:  Maxx Norman  MEDICAL RECORD NUMBER:  [id number]  AGE: 79 y.o.   GENDER: male  : 1945  TODAY'S DATE:  2025    Subjective   Reason for WOCN Evaluation and Assessment: LE cellulitis      Maxx Norman is a 79 y.o. male referred by:   Nursing    Wound Identification:  Wound Type: venous and cellulitis  Contributing Factors: none    Wound History: pt came to ED with increased swelling and redness for about 2 weeks PTA  Current Wound Care Treatment:  Ace wrap was removed by pt    Patient Care Goal:  Wound Healing        PAST MEDICAL HISTORY        Diagnosis Date    Atrial fibrillation (HCC)     Basal cell carcinoma     right ear and right cheek    Bilateral tinnitus     Bradycardia     required pacemaker     CAD (coronary artery disease)     4 stents placed     GERD (gastroesophageal reflux disease)     Hyperlipidemia     Hypertension     Osteoarthritis        PAST SURGICAL HISTORY    Past Surgical History:   Procedure Laterality Date    A-V CARDIAC PACEMAKER INSERTION      bradycardia     CORONARY ANGIOPLASTY WITH STENT PLACEMENT      CT BIOPSY BONE MARROW  3/17/2025    CT BIOPSY BONE MARROW 3/17/2025 [facility] CT    EP DEVICE PROCEDURE N/A 2024    Insert PPM dual performed by Shankar Carballo MD at [facility] CARDIAC CATH LAB    EXTERNAL EAR SURGERY Right     removed basal cell cancer    SKIN CANCER DESTRUCTION  2017    Right cheek       FAMILY HISTORY    Family History   Problem Relation Age of Onset    Arthritis Mother     Heart Disease Father     Stroke Father     Other Brother         couldnt walk and in nursing home  disable     No Known Problems Maternal Grandmother     High Blood Pressure Maternal Grandfather     No Known Problems Paternal Grandmother     No Known Problems Paternal Grandfather        SOCIAL HISTORY    Social History     Tobacco Use    Smoking status: Former     Current packs/day: 0.00     Average packs/day: 0.5 packs/day for

## 2025-04-23 NOTE — PLAN OF CARE
Problem: Chronic Conditions and Co-morbidities  Goal: Patient's chronic conditions and co-morbidity symptoms are monitored and maintained or improved  4/23/2025 0035 by Roslyn Harrell RN  Outcome: Progressing  Flowsheets (Taken 4/22/2025 2028)  Care Plan - Patient's Chronic Conditions and Co-Morbidity Symptoms are Monitored and Maintained or Improved:   Monitor and assess patient's chronic conditions and comorbid symptoms for stability, deterioration, or improvement   Collaborate with multidisciplinary team to address chronic and comorbid conditions and prevent exacerbation or deterioration   Update acute care plan with appropriate goals if chronic or comorbid symptoms are exacerbated and prevent overall improvement and discharge  4/22/2025 1224 by Bailey Clark RN  Outcome: Progressing  Flowsheets (Taken 4/22/2025 1044)  Care Plan - Patient's Chronic Conditions and Co-Morbidity Symptoms are Monitored and Maintained or Improved:   Monitor and assess patient's chronic conditions and comorbid symptoms for stability, deterioration, or improvement   Collaborate with multidisciplinary team to address chronic and comorbid conditions and prevent exacerbation or deterioration   Update acute care plan with appropriate goals if chronic or comorbid symptoms are exacerbated and prevent overall improvement and discharge     Problem: Discharge Planning  Goal: Discharge to home or other facility with appropriate resources  4/23/2025 0035 by Roslyn Harrell RN  Outcome: Progressing  Flowsheets (Taken 4/22/2025 2028)  Discharge to home or other facility with appropriate resources:   Identify barriers to discharge with patient and caregiver   Arrange for needed discharge resources and transportation as appropriate   Identify discharge learning needs (meds, wound care, etc)  4/22/2025 1224 by Bailey Clark RN  Outcome: Progressing  Flowsheets (Taken 4/22/2025 1044)  Discharge to home or other facility with

## 2025-04-23 NOTE — DISCHARGE SUMMARY
V2.0  Discharge Summary    Name:  Maxx Norman /Age/Sex: 1945 (79 y.o. male)   Admit Date: 2025  Discharge Date: 25    MRN & CSN:  [id number] & [id number] Encounter Date and Time 25 12:14 PM EDT    Attending:  Carlie Galloway MD Discharging Provider: Carlie Galloway MD       Hospital Course:     Brief HPI: Maxx Norman is a 79 y.o. male with a pmh of CAD, status post stent placements, hypertension, hyperlipidemia, paroxysmal A-fib, status post PPM, recent diagnosis of CML, on imatinib who was sent to hospital from oncology office due to bilateral lower extremity swelling, redness and pain.  Diagnosed with bilateral lower extremity cellulitis admitted to hospital.  Initially given broad-spectrum antibiotics with vancomycin and cefepime.  Blood cultures remain negative.  Switched to cefazolin.  Lower extremity swelling and erythema improved significantly. Given IV Lasix as well.  Switched to Bactrim on discharge.  Discharged home with home health with follow-up recommendations with PCP, oncology.  Patient instructed to take Lasix daily for a week and then continue her usual dose 3 times weekly.  Further instructions per outpatient follow-up with primary doctor.    Brief Problem Based Course:   Bilateral lower extremity cellulitis, started empirically on vancomycin and cefepime.  Erythema improved significantly.  Switched to cefazolin.  Blood cultures remain negative.  Lower extremity x-ray negative for any fracture.  Lower extremity Doppler negative for DVT.  Switched to Bactrim on discharge.    Bilateral lower extremity venous stasis  Bilateral chronic lower extremity edema, given IV Lasix, switched to po lasix on d/c. Echocardiogram from 3/25 reviewed with normal LV function.  CML, recent diagnosis, hold imatinib. Follow-up with oncology after discharge.  A-fib, continue Xarelto.  Elevated LFTs, overall improving compared to prior admission. Fatty liver on ultrasound noted.  Continue

## 2025-04-23 NOTE — PLAN OF CARE
Problem: Chronic Conditions and Co-morbidities  Goal: Patient's chronic conditions and co-morbidity symptoms are monitored and maintained or improved  4/23/2025 1119 by Cuca Lopez RN  Outcome: Progressing  4/23/2025 0035 by Roslyn Harrell RN  Outcome: Progressing  Flowsheets (Taken 4/22/2025 2028)  Care Plan - Patient's Chronic Conditions and Co-Morbidity Symptoms are Monitored and Maintained or Improved:   Monitor and assess patient's chronic conditions and comorbid symptoms for stability, deterioration, or improvement   Collaborate with multidisciplinary team to address chronic and comorbid conditions and prevent exacerbation or deterioration   Update acute care plan with appropriate goals if chronic or comorbid symptoms are exacerbated and prevent overall improvement and discharge     Problem: Discharge Planning  Goal: Discharge to home or other facility with appropriate resources  4/23/2025 1119 by Cuca Lopez RN  Outcome: Progressing  Flowsheets (Taken 4/23/2025 0930)  Discharge to home or other facility with appropriate resources:   Identify barriers to discharge with patient and caregiver   Arrange for needed discharge resources and transportation as appropriate  4/23/2025 0035 by Roslyn Harrell RN  Outcome: Progressing  Flowsheets (Taken 4/22/2025 2028)  Discharge to home or other facility with appropriate resources:   Identify barriers to discharge with patient and caregiver   Arrange for needed discharge resources and transportation as appropriate   Identify discharge learning needs (meds, wound care, etc)     Problem: Pain  Goal: Verbalizes/displays adequate comfort level or baseline comfort level  4/23/2025 1119 by Cuca Lopez RN  Outcome: Progressing  4/23/2025 0035 by Roslyn Harrell RN  Outcome: Progressing     Problem: Safety - Adult  Goal: Free from fall injury  4/23/2025 1119 by Cuca Lopez RN  Outcome: Progressing  4/23/2025 0035 by Roslyn Harrell

## 2025-04-23 NOTE — PROGRESS NOTES
ONCOLOGY HEMATOLOGY CARE PROGRESS NOTE      SUBJECTIVE:  Pt doing ok   He has wrapped legs   Id on board     ROS:     Constitutional:  No weight loss, No fever, No chills, No night sweats.  Energy level good.  Eyes:  No impairment or change in vision  ENT / Mouth:  No pain, abnormal ulceration, bleeding, nasal drip or change in voice or hearing  Cardiovascular:  No chest pain, palpitations, new edema, or calf discomfort  Respiratory:  No pain, hemoptysis, change to breathing  Breast:  No pain, discharge, change in appearance or texture  Gastrointestinal:  No pain, cramping, jaundice, change to eating and bowel habits  Urinary:  No pain, bleeding or change in continence  Genitalia: No pain, bleeding or discharge  Musculoskeletal:  No redness, pain, edema or weakness  Skin:  No pruritus, rash, change to nodules or lesions  Neurologic:  No discomfort, change in mental status, speech, sensory or motor activity  Psychiatric:  No change in concentration or change to affect or mood  Endocrine:  No hot flashes, increased thirst, or change to urine production  Hematologic: No petechiae, ecchymosis or bleeding  Lymphatic:  No lymphadenopathy or lymphedema  Allergy / Immunologic:  No eczema, hives, frequent or recurrent infections    OBJECTIVE        Physical    VITALS:  Patient Vitals for the past 24 hrs:   BP Temp Temp src Pulse Resp SpO2 Weight   04/22/25 1649 (!) 158/63 98.4 °F (36.9 °C) Oral 64 -- 100 % --   04/22/25 1156 (!) 149/79 97.5 °F (36.4 °C) Oral 59 16 100 % --   04/22/25 1045 (!) 154/60 98.4 °F (36.9 °C) Oral 61 15 100 % --   04/22/25 0823 (!) 149/72 98.2 °F (36.8 °C) Oral 62 16 100 % --   04/22/25 0403 (!) 132/52 97.5 °F (36.4 °C) Oral 61 16 97 % 96 kg (211 lb 10.3 oz)   04/21/25 2328 (!) 144/58 98.1 °F (36.7 °C) Oral 63 16 100 % --   04/21/25 2052 136/63 97.5 °F (36.4 °C) Oral 62 16 97 % --       24HR INTAKE/OUTPUT:    Intake/Output Summary (Last 24 hours) at 4/22/2025 
                                      ONCOLOGY HEMATOLOGY CARE PROGRESS NOTE      SUBJECTIVE:  Pt pulled off his bandages redness is oimproved       ROS:     Constitutional:  No weight loss, No fever, No chills, No night sweats.  Energy level good.  Eyes:  No impairment or change in vision  ENT / Mouth:  No pain, abnormal ulceration, bleeding, nasal drip or change in voice or hearing  Cardiovascular:  No chest pain, palpitations, new edema, or calf discomfort  Respiratory:  No pain, hemoptysis, change to breathing  Breast:  No pain, discharge, change in appearance or texture  Gastrointestinal:  No pain, cramping, jaundice, change to eating and bowel habits  Urinary:  No pain, bleeding or change in continence  Genitalia: No pain, bleeding or discharge  Musculoskeletal:  No redness, pain, edema or weakness  Skin:  No pruritus, rash, change to nodules or lesions  Neurologic:  No discomfort, change in mental status, speech, sensory or motor activity  Psychiatric:  No change in concentration or change to affect or mood  Endocrine:  No hot flashes, increased thirst, or change to urine production  Hematologic: No petechiae, ecchymosis or bleeding  Lymphatic:  No lymphadenopathy or lymphedema  Allergy / Immunologic:  No eczema, hives, frequent or recurrent infections    OBJECTIVE        Physical    VITALS:  Patient Vitals for the past 24 hrs:   BP Temp Temp src Pulse Resp SpO2 Weight   04/23/25 0814 -- -- -- 65 16 100 % --   04/23/25 0515 -- -- -- -- -- -- 87.2 kg (192 lb 3.9 oz)   04/22/25 2000 (!) 163/53 98.2 °F (36.8 °C) Oral 64 16 100 % --   04/22/25 1649 (!) 158/63 98.4 °F (36.9 °C) Oral 64 -- 100 % --   04/22/25 1156 (!) 149/79 97.5 °F (36.4 °C) Oral 59 16 100 % --   04/22/25 1045 (!) 154/60 98.4 °F (36.9 °C) Oral 61 15 100 % --   04/22/25 0823 (!) 149/72 98.2 °F (36.8 °C) Oral 62 16 100 % --       24HR INTAKE/OUTPUT:    Intake/Output Summary (Last 24 hours) at 4/23/2025 0819  Last data filed at 4/22/2025 2000  Gross 
4 Eyes Skin Assessment     NAME:  Maxx Norman  YOB: 1945  MEDICAL RECORD NUMBER:  [id number]    The patient is being assessed for  Admission    I agree that at least one RN has performed a thorough Head to Toe Skin Assessment on the patient. ALL assessment sites listed below have been assessed.      Areas assessed by both nurses:    Head, Face, Ears, Shoulders, Back, Chest, Arms, Elbows, Hands, Sacrum. Buttock, Coccyx, Ischium, and Legs. Feet and Heels        Does the Patient have a Wound? Yes wound(s) were present on assessment. LDA wound assessment was Initiated and completed by RN       Abdirashid Prevention initiated by RN: Yes  Wound Care Orders initiated by RN: Yes    Pressure Injury (Stage 3,4, Unstageable, DTI, NWPT, and Complex wounds) if present, place Wound referral order by RN under : Yes    New Ostomies, if present place, Ostomy referral order under : No     Nurse 1 eSignature: Electronically signed by Bailey Clark RN on 4/21/25 at 5:54 PM EDT    **SHARE this note so that the co-signing nurse can place an eSignature**    Nurse 2 eSignature: Electronically signed by Tamy Rocha RN on 4/21/25 at 5:54 PM EDT       
Admitted to room 4124 via stretcher from ED.   Pleasant and cooperative. Hard of hearing  VSS  V-paced on tele  Denies pain or discomfort.     Oriented to room and call system.     Orders released and reviewed with patient and family at bedside    Call light placed within reach    No needs voiced at this time  
CLINICAL PHARMACY NOTE: MEDS TO BEDS    Total # of Prescriptions Filled: 1   The following medications were delivered to the patient:  Current Discharge Medication List        START taking these medications    Details   sulfamethoxazole-trimethoprim (BACTRIM DS;SEPTRA DS) 800-160 MG per tablet Take 1 tablet by mouth 2 times daily for 7 days  Qty: 14 tablet, Refills: 0               Additional Documentation: Picked up by son    
Discharge instructions given to the pt and pt son, both verbalized understanding, all questions answered, pt wheeled down the torrez for discharge home.     Electronically signed by Cuca Lopez RN on 4/23/25 at 1:29 PM EDT   
LE wrapped and elevated as per order    Electronically signed by Bailey Clark RN on 4/22/2025 at 12:51 PM     
Medication Reconciliation    List of medications patient is currently taking is complete.     Source of information: 1. Conversation with patient/son at bedside                                       2. EPIC records      Notes regarding home medications:   1. Patient received all of his morning home medications prior to arrival to the ER today.  2. Patient takes Xarelto 20 mg po QAM - received this AM.  3. Patient is taking Furosemide 40 mg po three times weekly (MON/WED/FRI) and Spironolactone 25 mg po Q24H.  4. Patient has been taking Gleevec 400 mg po Q48H prior to hospitalization    [facility], PharmD, BCPS  4/21/2025 1:05 PM            
Patient off of unit for vascular venous duplex to bilateral lower extremities    Electronically signed by Bailey Clark RN on 4/22/2025 at 7:45 AM     Patient back from testing  No needs voiced at this time    Electronically signed by Bailey Clark RN on 4/22/2025 at 10:10 AM   
Pt and his son getting very antsy about leaving, messaged Dr. Galloway if pt can be discharged now.       
Pt took both his IVs out, tele monitor off, and all dressed up for discharge, Son is at bedside, tried talking to the pt about staying, Son wants to talk to Dr. Galloway, Messaged thru [employer].     Electronically signed by Cuca Lopez RN on 4/23/25 at 8:08 AM EDT     Dr. Galloway at bedside, ok per MD for no IV, changed Lasix and Abx to PO.       
Pt was out of room   Will stop back later tonight   Will discuss alternative option for cml -as he prob wont handle gleevec with swelling   It also depends on the rest of his health -the cml is a problem but wont take him in six months-untreated cml pts last about 5 years -in the old days before we had good treatment-now they live normal life span with medication   
chloride flush  5-40 mL IntraVENous 2 times per day    furosemide  40 mg IntraVENous BID    vitamin C  250 mg Oral Daily    [Held by provider] rivaroxaban  20 mg Oral Daily with breakfast    sodium chloride  1 g Oral BID WC    spironolactone  25 mg Oral Daily    cholecalciferol  400 Units Oral Daily      Infusions:    sodium chloride       PRN Meds: sodium chloride flush, 5-40 mL, PRN  sodium chloride, , PRN  potassium chloride, 40 mEq, PRN   Or  potassium alternative oral replacement, 40 mEq, PRN   Or  potassium chloride, 10 mEq, PRN  magnesium sulfate, 2,000 mg, PRN  ondansetron, 4 mg, Q8H PRN   Or  ondansetron, 4 mg, Q6H PRN  polyethylene glycol, 17 g, Daily PRN  acetaminophen, 650 mg, Q6H PRN   Or  acetaminophen, 650 mg, Q6H PRN  fluticasone, 2 spray, Daily PRN        Labs and Imaging   Vascular duplex lower extremity venous bilateral  Result Date: 4/22/2025    No evidence of deep vein and superficial thrombosis in the right lower extremity.   No evidence of deep vein and superficial thrombosis in the left lower extremity.   Pulsatile venous flow noted in biateral lower extremities. Summary: No DVT or superficial venous thrombus of the visualized vessels of the bilateral lower extremities Pulsatility noted in the bilateral common femoral and popliteal veins which may be consistent with right heart dysfunction     XR TIBIA FIBULA LEFT (2 VIEWS)  Result Date: 4/21/2025  EXAMINATION: 2 XRAY VIEWS OF THE LEFT TIBIA AND FIBULA 4/21/2025 11:11 am COMPARISON: None. HISTORY: ORDERING SYSTEM PROVIDED HISTORY: cellulitis TECHNOLOGIST PROVIDED HISTORY: Reason for exam:->cellulitis Reason for Exam: cellultiis FINDINGS: There is no evidence of acute fracture.  There is normal alignment.  No acute joint abnormality.  No focal osseous lesion. No focal soft tissue abnormality.     No acute osseous abnormality.     XR FOOT RIGHT (MIN 3 VIEWS)  Result Date: 4/21/2025  EXAMINATION: XRAY VIEWS OF THE RIGHT FOOT 4/21/2025 11:11 am

## 2025-04-24 ENCOUNTER — CARE COORDINATION (OUTPATIENT)
Dept: CASE MANAGEMENT | Age: 80
End: 2025-04-24

## 2025-04-24 DIAGNOSIS — L03.116 BILATERAL LOWER LEG CELLULITIS: Primary | ICD-10-CM

## 2025-04-24 DIAGNOSIS — L03.115 BILATERAL LOWER LEG CELLULITIS: Primary | ICD-10-CM

## 2025-04-24 PROCEDURE — 1111F DSCHRG MED/CURRENT MED MERGE: CPT | Performed by: INTERNAL MEDICINE

## 2025-04-24 RX ORDER — SPIRONOLACTONE 25 MG/1
25 TABLET ORAL DAILY
Qty: 90 TABLET | Refills: 5 | Status: SHIPPED | OUTPATIENT
Start: 2025-04-24

## 2025-04-24 NOTE — CARE COORDINATION
Called and spoke to pt's son (Benito) who states that he will speak to pt and see if he wants to schedule a follow-up. States that the pt is getting very tired of seeing doctors and not sure if he would like to schedule. Benito will call office back if pt would like to schedule.    If pt does, please place on schedule for Dr. Humphries for Monday (4/29/25) at 3:45pm.

## 2025-04-24 NOTE — CARE COORDINATION
Care Transitions Note    Initial Call - Call within 2 business days of discharge: Yes    Patient Current Location:  Home: [address] [place] [address] [place] [address]    Care Transition Nurse contacted the patient by telephone to perform post hospital discharge assessment, verified name and  as identifiers.  Provided introduction to self, and explanation of the Care Transition Nurse role.    Patient: Maxx Norman    Patient : 1945   MRN: <[id number]>     Reason for Admission: Bilateral LE Edema, Cellulitis, Generalized weakness, CHF  Discharge Date: 25    RURS: Readmission Risk Score: 24      Last Discharge Facility       Date Complaint Diagnosis Description Type Department Provider    25 Leg Swelling Localized edema ... ED to Hosp-Admission (Discharged) (ADMITTED) ANGELLA AbdullahiW Carlie Galloway MD            Was this an external facility discharge? No    Additional needs identified to be addressed with provider   No needs identified             Method of communication with provider: none.    Patients top risk factors for readmission: functional physical ability, medical condition-Bilateral LE Edema, Cellulitis, Generalized weakness, CHF, and multiple health system providers    Interventions to address risk factors:   Education: spouse instructed to continue to monitor patient for any worsening swelling to bilateral legs, any fevers or chills, any increased drainage or weeping from legs, reporting to MD immediately.  Review of patient management of conditions/medications: make sure to rest as needed, taking all medications as prescribed, continue to elevate bilateral legs, and keep wrapped as instructed.  Home Health: CTN confirmed with intake department with [facility], referral for [facility] services was received.     Care Summary Note: CTN spoke with patients spouse, and patient this am for initial 24 hour discharge follow up CTN call.  Spouse and patient report patient is doing better.  No increased

## 2025-04-25 ENCOUNTER — PATIENT MESSAGE (OUTPATIENT)
Dept: FAMILY MEDICINE CLINIC | Age: 80
End: 2025-04-25

## 2025-04-25 DIAGNOSIS — E87.1 HYPONATREMIA: Primary | ICD-10-CM

## 2025-04-25 LAB
BACTERIA BLD CULT ORG #2: NORMAL
BACTERIA BLD CULT: NORMAL

## 2025-04-25 RX ORDER — SODIUM CHLORIDE 1 G/1
1 TABLET ORAL 2 TIMES DAILY WITH MEALS
Qty: 60 TABLET | Refills: 3 | Status: SHIPPED | OUTPATIENT
Start: 2025-04-25

## 2025-04-28 ENCOUNTER — CARE COORDINATION (OUTPATIENT)
Dept: CASE MANAGEMENT | Age: 80
End: 2025-04-28

## 2025-04-28 ENCOUNTER — TELEPHONE (OUTPATIENT)
Dept: FAMILY MEDICINE CLINIC | Age: 80
End: 2025-04-28

## 2025-04-28 NOTE — CARE COORDINATION
Spoke with pt's son Benito he declined to schedule a hospital follow up at this time. The nurses are coming out and pt is doing fine stated he needs a break from seeing so many dr's. He will call back at a later time to schedule something.   
upcoming appointment(s).  Future Appointments         Provider Specialty Dept Phone    5/1/2025 10:15 AM Rony Lechuga APRN - CNP Wound Ostomy [phone]    7/24/2025 3:15 PM Michelle Humphries MD Family Medicine [phone]            Care Transition Nurse provided contact information.  Plan for follow-up call in 6-10 days based on severity of symptoms and risk factors.  Plan for next call: symptom management-any worsening BLE Edema?  self management-safety precautions, VSS monitoring  follow-up appointment-HFU with PCP has been scheduled?      Thank You,    Norah Winter RN  Care Transition Coordinator  Contact Number:[phone]

## 2025-05-02 ENCOUNTER — CARE COORDINATION (OUTPATIENT)
Dept: CARE COORDINATION | Age: 80
End: 2025-05-02

## 2025-05-02 NOTE — CARE COORDINATION
Follow up call with spouse. She reported that everything was going well. She stated that [facility] RN indicated that patient's legs were looking better. SW inquired if any in home assistance was needed. Spouse denied. She stated all help that they need, he son is able to assist. SW provided her info on [employer]. Offered to send out info if needed in the future.

## 2025-05-05 ENCOUNTER — CARE COORDINATION (OUTPATIENT)
Dept: CASE MANAGEMENT | Age: 80
End: 2025-05-05

## 2025-05-05 NOTE — CARE COORDINATION
Care Transitions Note    Follow Up Call     Patient Current Location:  Home: [address] [facility] [place] [address]    Care Transition Nurse contacted the spouse/partner  by telephone. Verified name and  as identifiers.    Additional needs identified to be addressed with provider   No needs identified                 Method of communication with provider: none.    Care Summary Note: CTN spoke with patients spouse this am for follow up CTN call.  Spouse states patient is doing really well, states he is not having any fever, chills, nausea, vomiting, chest pain, SOB or cough.   Patient with no congestion, pain, difficulty emptying bladder, feeling lightheaded, dizziness, and heart palpitations.  BLE Edema is much better, states swelling is down a lot, almost gone. Wounds healing well, no weeping, states SN was in home today and stated legs were doing really well.  No reports of any falls, no worsening weakness or fatigue, at this time.  No reports of any other issues or concerns, he is eating well and having regular BM's.  No new or changed medications at this time.      Plan of care updates since last contact:  Education: Spouse instructed to continue to monitor for any worsening weakness or fatigue, any falls, any returning increased BLE Edema, weeping, reporting to MD immediately.  Review of patient management of conditions/medications: make sure to rest as needed, taking all medications as prescribed.         Advance Care Planning:   Does patient have an Advance Directive: reviewed during previous call, see note.  and   Primary Decision Maker: Benito Norman - Child - [phone]    Secondary Decision Maker: Goldy Bragabie - Spouse - [phone] .    Medication Review:  No changes since last call.     Assessments:  Care Transitions Subsequent and Final Call    Schedule Follow Up Appointment with PCP: Declined  Subsequent and Final Calls  Do you have any ongoing symptoms?: No  Have your medications changed?:

## 2025-05-08 RX ORDER — ATORVASTATIN CALCIUM 40 MG/1
40 TABLET, FILM COATED ORAL NIGHTLY
Qty: 90 TABLET | Refills: 0 | Status: SHIPPED | OUTPATIENT
Start: 2025-05-08

## 2025-05-12 ENCOUNTER — CARE COORDINATION (OUTPATIENT)
Dept: CASE MANAGEMENT | Age: 80
End: 2025-05-12

## 2025-05-12 NOTE — CARE COORDINATION
Care Transitions Note    Follow Up Call     Attempted to reach patient for transitions of care follow up.  Unable to reach patient.      Outreach Attempts:   1ST CTC attempt to reach Pt regarding recent hospital discharge.  CTC left voice recording with call back number requesting a call back. Will attempt to reach patient again.    Follow Up Appointment:   Future Appointments         Provider Specialty Dept Phone    7/24/2025 3:15 PM Michelle Humphries MD Family Medicine [phone]            Plan for follow-up call in 6-10 days based on severity of symptoms and risk factors.     Thank You,    Norah Winter RN  Care Transition Coordinator  Contact Number:[phone]

## 2025-05-15 ENCOUNTER — TELEPHONE (OUTPATIENT)
Dept: FAMILY MEDICINE CLINIC | Age: 80
End: 2025-05-15
Payer: MEDICARE

## 2025-05-15 DIAGNOSIS — L03.116 CELLULITIS OF LEFT LOWER LIMB: Primary | ICD-10-CM

## 2025-05-15 PROCEDURE — G0180 MD CERTIFICATION HHA PATIENT: HCPCS | Performed by: INTERNAL MEDICINE

## 2025-05-19 ENCOUNTER — CARE COORDINATION (OUTPATIENT)
Dept: CARE COORDINATION | Age: 80
End: 2025-05-19

## 2025-05-19 NOTE — CARE COORDINATION
Final outreach call to spouse. She reported everything was well. No SW needs to be addressed. HHC is still active.    SW signing off.

## 2025-05-22 ENCOUNTER — CARE COORDINATION (OUTPATIENT)
Dept: CASE MANAGEMENT | Age: 80
End: 2025-05-22

## 2025-05-22 NOTE — CARE COORDINATION
Care Transitions Note    Final Call     Patient Current Location:  Home: [address] [place] [address] [place] [address]    Care Transition Nurse contacted the spouse/partner  by telephone. Verified name and  as identifiers.    Patient graduated from the Care Transitions program on 2025.  Patient/family verbalizes confidence in the ability to self-manage at this time. has the ability to self-manage at this time. progressing towards self-management. .      Advance Care Planning:   Does patient have an Advance Directive: reviewed during previous call, see note.  and   Primary Decision Maker: Benito Norman - Child - [phone]    Secondary Decision Maker: OmiAlexus - Spouse - [phone] .    Handoff:   Patient was not referred to the ACM team due to no additional needs identified.       Care Summary Note: CTN spoke with patients spouse this afternoon for follow up CTN call.   Spouse states patient is doing well, states he is not having any worsening BLE Edema, states this is at baseline.  Dressings being managed by SN with [facility], no weeping or drainage on outside of dressings.  No reports of any fever chills, nausea, vomiting, chest pain, SOB or cough.   Patient with no congestion, pain, difficulty emptying bladder, feeling lightheaded, dizziness, and heart palpitations.  No falls, he is eating well, no falls since returning home.  No other issues or concerns at this time, no new or changed medications.  He completed a 7 day course of PO ABX's as well.    Assessments:  Care Transitions Subsequent and Final Call    Schedule Follow Up Appointment with PCP: Completed  Subsequent and Final Calls  Do you have any ongoing symptoms?: No  Have your medications changed?: No  Do you have any questions related to your medications?: No  Do you currently have any active services?: Yes  Are you currently active with any services?: Home Health  Do you have any needs or concerns that I can assist you with?:

## 2025-06-09 ENCOUNTER — TELEPHONE (OUTPATIENT)
Dept: FAMILY MEDICINE CLINIC | Age: 80
End: 2025-06-09

## 2025-06-09 NOTE — TELEPHONE ENCOUNTER
[facility] IS CALLING, STATES THAT PT IS BEING DISCHARGED FROM HOME HEALTH SKILLED NURSING - FYI SHE IS DOING GREAT PER SOPHY .

## 2025-07-10 RX ORDER — ATORVASTATIN CALCIUM 40 MG/1
40 TABLET, FILM COATED ORAL NIGHTLY
Qty: 90 TABLET | Refills: 0 | Status: SHIPPED | OUTPATIENT
Start: 2025-07-10

## 2025-07-16 ENCOUNTER — CARE COORDINATION (OUTPATIENT)
Dept: CARE COORDINATION | Age: 80
End: 2025-07-16

## 2025-07-16 NOTE — CARE COORDINATION
Ambulatory Care Coordination Note     7/16/2025 12:13 PM     Patient outreach attempt by this ACM today to offer care management services. ACM was unable to reach the patient by telephone today;   left voice message requesting a return phone call to this ACM.     ACM: Eun Burnett RN     Care Summary Note: zeinab paiz    PCP/Specialist follow up:       Follow Up:   Plan for next ACM outreach in approximately 1-2 days  to complete:  - outreach attempt to offer care management services.

## 2025-07-16 NOTE — CARE COORDINATION
Ambulatory Care Coordination Note     2025 1:56 PM     Patient Current Location:  Home: [id number] [place] [address] [place] [address]     This patient was received as a referral from Population health report .    Spouse/Partner contacted the ACM by telephone. Verified name and  with spouse/partner as identifiers. Provided introduction to self, and explanation of the ACM role.   Spouse/Partner accepted care management services at this time.          ACM: Eun Burnett RN     Challenges to be reviewed by the provider   Additional needs identified to be addressed with provider No  none               Method of communication with provider: phone.    Utilization: Initial Call - N/A    Care Summary Note: HHC is completed. Uses cane mostly for gait. Appetite sleep no issues. No new s/s to report. Acp on file no changes. Agreeable to calls.     Plan:    Sdoh,med check  Send edu htn,afibb  Fu with [facility] Chronic myelogenous leukemia (CML)     Offered patient enrollment in the Remote Patient Monitoring (RPM) program for in-home monitoring: Patient is not eligible for RPM program because: state.     Assessments Completed:       2025     1:50 PM   Amb Fall Risk Assessment and TUG Test   Do you feel unsteady or are you worried about falling?  no   2 or more falls in past year? no   Fall with injury in past year? no    ,   Ambulatory Care Coordination Assessment    Care Coordination Protocol  Referral from Primary Care Provider: No  Week 1 - Initial Assessment     Do you have all of your prescriptions and are they filled?: Yes  Barriers to medication adherence: None  Are you able to afford your medications?: Yes  How often do you have trouble taking your medications the way you have been told to take them?: I always take them as prescribed.     Do you have Home O2 Therapy?: No      Ability to seek help/take action for Emergent Urgent situations i.e. fire, crime, inclement weather or health crisis.: Independent  Ability to

## 2025-07-21 ENCOUNTER — TELEPHONE (OUTPATIENT)
Dept: CARDIOLOGY CLINIC | Age: 80
End: 2025-07-21

## 2025-07-21 NOTE — TELEPHONE ENCOUNTER
Called Noris back [facility] to clarify if she was just calling as an FYI or if they need Dr. Reyes's \"ok\" to change Xarelto to Eliquis

## 2025-07-21 NOTE — TELEPHONE ENCOUNTER
Medication Question/Concern    What is the name of the medication you need to speak with someone about?  rivaroxaban (XARELTO)    Was this prescribed by your cardiologist?   Yes    Dosage of the medication:  20 MG TABS tablet     How are you taking this medication (QD, BID, TID, QID, PRN):  Take 1 tablet by mouth daily Indications    What issues/concerns are you having with this medication?  [facility]is from [facility] called to relay changing Maxx's Xarelto to Eliquis? Now following [facility], this medication is under consideration. PCP is wanting to have Maxx on Eliquis 5MG BID.    Noris's callback: [phone]

## 2025-07-22 NOTE — TELEPHONE ENCOUNTER
Called and spoke with Noris. Advised her that Dr. Reyes is OOT until the end of the week, but will discuss upon return.     Please reach out to patient to schedule overdue f/u with Dr. Reyes.

## 2025-07-22 NOTE — TELEPHONE ENCOUNTER
Noris returned call to see if Dr. Reyes would \"ok\" to change Xarelto to Eliquis.     Noris's call back # [phone]

## 2025-07-24 NOTE — TELEPHONE ENCOUNTER
Called patient's son and he was unaware that Maxx was seeing Eric.    Relayed that he was last seen in 2023, recently had OV with DESTINY Balderrama in March, son wanted to schedule annual f/u with EP and not Eric.

## 2025-07-25 NOTE — TELEPHONE ENCOUNTER
[facility]is from [facility] called back. Advised her that Dr. Reyes is still OOT and will return next week. Noris states that she will be OOT next week as well, but it is ok to leave the information on the VM.    Looking at note below, son would like to schedule PT with EP and not Dr. Reyes. PT seen Dr. Carballo 10/23/2024. Can Haris confirm if PT can switch Xarelto to Eliquis?

## 2025-07-25 NOTE — TELEPHONE ENCOUNTER
Discussed with Shankar Carballo MD ok to change to Eliquis.    Called [facility] at [facility] and notified ok to change.  She stated she will call patient notify of change.     Follow up with EP NP Sandra DUFFY scheduled for 10/10/2025.

## 2025-08-05 ENCOUNTER — CARE COORDINATION (OUTPATIENT)
Dept: CARE COORDINATION | Age: 80
End: 2025-08-05

## 2025-08-22 DIAGNOSIS — E87.1 HYPONATREMIA: ICD-10-CM

## 2025-08-22 LAB
ANION GAP SERPL CALCULATED.3IONS-SCNC: 10 MMOL/L (ref 3–16)
BUN SERPL-MCNC: 12 MG/DL (ref 7–20)
CALCIUM SERPL-MCNC: 9.9 MG/DL (ref 8.3–10.6)
CHLORIDE SERPL-SCNC: 102 MMOL/L (ref 99–110)
CO2 SERPL-SCNC: 26 MMOL/L (ref 21–32)
CREAT SERPL-MCNC: 1.2 MG/DL (ref 0.8–1.3)
GFR SERPLBLD CREATININE-BSD FMLA CKD-EPI: 61 ML/MIN/{1.73_M2}
GLUCOSE SERPL-MCNC: 99 MG/DL (ref 70–99)
MAGNESIUM SERPL-MCNC: 1.85 MG/DL (ref 1.8–2.4)
POTASSIUM SERPL-SCNC: 4.3 MMOL/L (ref 3.5–5.1)
SODIUM SERPL-SCNC: 138 MMOL/L (ref 136–145)

## 2025-08-26 ENCOUNTER — CARE COORDINATION (OUTPATIENT)
Dept: CARE COORDINATION | Age: 80
End: 2025-08-26

## 2025-08-26 SDOH — HEALTH STABILITY: MENTAL HEALTH
DO YOU FEEL STRESS - TENSE, RESTLESS, NERVOUS, OR ANXIOUS, OR UNABLE TO SLEEP AT NIGHT BECAUSE YOUR MIND IS TROUBLED ALL THE TIME - THESE DAYS?: NOT AT ALL

## 2025-08-26 SDOH — HEALTH STABILITY: MENTAL HEALTH: HOW OFTEN DO YOU HAVE A DRINK CONTAINING ALCOHOL?: NEVER

## 2025-08-26 SDOH — HEALTH STABILITY: PHYSICAL HEALTH: ON AVERAGE, HOW MANY DAYS PER WEEK DO YOU ENGAGE IN MODERATE TO STRENUOUS EXERCISE (LIKE A BRISK WALK)?: 2 DAYS

## 2025-08-26 SDOH — HEALTH STABILITY: MENTAL HEALTH: HOW MANY DRINKS CONTAINING ALCOHOL DO YOU HAVE ON A TYPICAL DAY WHEN YOU ARE DRINKING?: PATIENT DOES NOT DRINK

## 2025-08-26 SDOH — ECONOMIC STABILITY: TRANSPORTATION INSECURITY: IN THE PAST 12 MONTHS, HAS LACK OF TRANSPORTATION KEPT YOU FROM MEDICAL APPOINTMENTS OR FROM GETTING MEDICATIONS?: NO

## 2025-08-26 SDOH — ECONOMIC STABILITY: HOUSING INSECURITY: IN THE LAST 12 MONTHS, WAS THERE A TIME WHEN YOU WERE NOT ABLE TO PAY THE MORTGAGE OR RENT ON TIME?: NO

## 2025-08-26 SDOH — ECONOMIC STABILITY: FOOD INSECURITY: WITHIN THE PAST 12 MONTHS, YOU WORRIED THAT YOUR FOOD WOULD RUN OUT BEFORE YOU GOT THE MONEY TO BUY MORE.: NEVER TRUE

## 2025-08-26 SDOH — ECONOMIC STABILITY: FOOD INSECURITY: HOW HARD IS IT FOR YOU TO PAY FOR THE VERY BASICS LIKE FOOD, HOUSING, MEDICAL CARE, AND HEATING?: NOT HARD AT ALL

## 2025-08-26 SDOH — SOCIAL STABILITY: SOCIAL NETWORK
IN A TYPICAL WEEK, HOW MANY TIMES DO YOU TALK ON THE PHONE WITH FAMILY, FRIENDS, OR NEIGHBORS?: MORE THAN THREE TIMES A WEEK

## 2025-08-26 SDOH — SOCIAL STABILITY: SOCIAL INSECURITY: ARE YOU MARRIED, WIDOWED, DIVORCED, SEPARATED, NEVER MARRIED, OR LIVING WITH A PARTNER?: MARRIED

## 2025-08-26 SDOH — ECONOMIC STABILITY: FOOD INSECURITY: WITHIN THE PAST 12 MONTHS, THE FOOD YOU BOUGHT JUST DIDN'T LAST AND YOU DIDN'T HAVE MONEY TO GET MORE.: NEVER TRUE

## 2025-08-26 SDOH — SOCIAL STABILITY: SOCIAL NETWORK: HOW OFTEN DO YOU GET TOGETHER WITH FRIENDS OR RELATIVES?: MORE THAN THREE TIMES A WEEK

## 2025-08-26 SDOH — HEALTH STABILITY: PHYSICAL HEALTH: ON AVERAGE, HOW MANY MINUTES DO YOU ENGAGE IN EXERCISE AT THIS LEVEL?: 10 MIN

## 2025-08-26 ASSESSMENT — ACTIVITIES OF DAILY LIVING (ADL): LACK_OF_TRANSPORTATION: NO

## (undated) DEVICE — RADIFOCUS GLIDEWIRE: Brand: GLIDEWIRE

## (undated) DEVICE — MERITMEDICAL 7FR PRELUDE SNAP PEEL-AWAY

## (undated) DEVICE — MEDTRONIC HIS SHEATH